# Patient Record
Sex: MALE | Race: WHITE | NOT HISPANIC OR LATINO | Employment: FULL TIME
[De-identification: names, ages, dates, MRNs, and addresses within clinical notes are randomized per-mention and may not be internally consistent; named-entity substitution may affect disease eponyms.]

---

## 2024-09-17 ENCOUNTER — TRANSCRIBE ORDERS (OUTPATIENT)
Dept: OTHER | Age: 34
End: 2024-09-17

## 2024-09-17 DIAGNOSIS — C16.0 GE JUNCTION CARCINOMA (H): Primary | ICD-10-CM

## 2024-09-19 NOTE — TELEPHONE ENCOUNTER
Action September 26, 2024 3:04 PM ABT   Action Taken Slides from [facility] received and taken to 5th floor path lab for review.     Action September 20, 2024 1:46 PM BWM   Action Taken Images from [facility] received and resolved into PACS on 9/20     RECORDS STATUS - ALL OTHER DIAGNOSIS      RECORDS RECEIVED FROM: [facility]   NOTES STATUS DETAILS   OFFICE NOTE from referring provider [facility] 9/17/2024 - Dr. Rivera Dacosta   OFFICE NOTE from medical oncologist [facility] 9/9/2024 - Dr. Edu Lewis   OPERATIVE REPORT [facility] 9/12/2024 - Endoscopy   9/5/2024, 8/30/2024 - EGD   MEDICATION LIST [facility]    LABS     PATHOLOGY REPORTS Req from [facility] on 9/19 9/12/2024 - [id number]   9/5/2024 - [id number]   8/30/2024 - [id number]    ANYTHING RELATED TO DIAGNOSIS [facility] 9/5/2024   PATHOLOGY [facility] TRACKING   TRACKING #: [id number]   IMAGING (NEED IMAGES & REPORT)     CT SCANS PACS CT Chest/Abdomen/Pelvis: 8/30/2024   PET PACS PET Skull Base to Mid Thigh: 9/16/2024      Addended by: Porfirio Lassiter on: 2/17/2021 04:42 PM     Modules accepted: Orders

## 2024-09-24 NOTE — PROGRESS NOTES
THORACIC SURGERY NEW PATIENT HISTORY AND PHYSICAL EXAM     Dear ,    I saw Prashant Allen at Dr. 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     HPI  Prashant Allen is a 34 year old male who is referred with the diagnosis of an adenocarcinoma of the GEJ with suspected linitis plastica involving the entire stomach until ~ 1 cm proximal to the pylorus. Endoscopic biopsies of the suspected LP were inconclusive. Of note, the EUS scope could not pass the prepyloric area due to gastric wall thickening, the regular scope could pass.  Prashant has lost ~20 pounds and his main symptom is early satiety.    Previsit Tests   EGD (2024): Non-circumferential mass at 40 cm starting at the z-line, nodular appearing gastric mucosa, normal appearing D1 and D2.  Biopsy (2024):  EUS (Dr. Vela):   The GEJ lesion was ~ 2cm in length and did not invade the muscularis propria. Seven small LN (< 10 mm) in the LGA area, one was sampled (no sonographic description of the LN, but interpreted as possible N3). uT1N3. FNA of LN reportedly negative.  The GEJ mass appears to be separate from the linitis plastica involving >10 of the distal stomach. The gastric wall was up to 1.3 cm thick, and the very distal portion of the gastric wall transitioned to normal close to the pylorus.   EGD with biopsy of antrum (Dr. Vela 2024): No evidence of malignancy  PET-CT (2024): GEJ SUV ~10, diffuse gastric SUV ~4.5      Antral PET positive thickening to the pre-pyloric area and a PET positive nodular area extending into the surrounding fat          PMH  Reviewed, as below    No past medical history on file.     PSH  Reviewed, as below    No past surgical history on file.     FH  Mother  at 31 from breast cancer. Many maternal relatives have cancers and several are CHEK2 positive.  Prashant has not been genetically tested.    No current outpatient  "medications on file.     No current facility-administered medications for this visit.      No Known Allergies     Review Of Systems  Skin: negative  Eyes: negative  Ears/Nose/Throat: negative  Respiratory: No shortness of breath, dyspnea on exertion, cough, or hemoptysis  Cardiovascular: negative  Gastrointestinal: negative and as in HPI  Genitourinary: negative  Musculoskeletal: negative  Neurologic: negative  Psychiatric: negative  Hematologic/Lymphatic/Immunologic: negative  Endocrine: negative      ETOH Rare  TOB Never smoker    Physical examination  /78 (BP Location: Right arm, Patient Position: Right side, Cuff Size: Adult Regular)   Pulse 56   Temp 97.8  F (36.6  C) (Oral)   Resp 16   Ht 1.89 m (6' 2.41\")   Wt 101.3 kg (223 lb 4.8 oz)   SpO2 99%   BMI 28.36 kg/m    No palpable lymphadenopathy  No skin nodules  Lungs clear  Heart regular, no murmurs  Abdomen soft, non-tender  Extremities warm, no IE edema      From a personal perspective, he works as a . He is here with his aunt, Nancy, who is also his adoptive mother. [place] mother  of breast cancer when he was 6.      Code Status: Full Code    Health Care Proxy: He has not yet filled out a directive, but he knows that it is available on [employer].    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 I reviewed the plan as follows:        1) Review of all pathological and cytological samples    2) Diagnostic laparoscopy and EGD: He is scheduled for .          I appreciate the opportunity to participate in the care of your patient and will keep you " updated.      Sincerely,    Willy Alvarado MD

## 2024-09-25 ENCOUNTER — PRE VISIT (OUTPATIENT)
Dept: SURGERY | Facility: CLINIC | Age: 34
End: 2024-09-25
Payer: COMMERCIAL

## 2024-09-25 ENCOUNTER — PREP FOR PROCEDURE (OUTPATIENT)
Dept: SURGERY | Facility: CLINIC | Age: 34
End: 2024-09-25
Payer: COMMERCIAL

## 2024-09-25 ENCOUNTER — TELEPHONE (OUTPATIENT)
Dept: SURGERY | Facility: CLINIC | Age: 34
End: 2024-09-25
Payer: COMMERCIAL

## 2024-09-25 ENCOUNTER — ONCOLOGY VISIT (OUTPATIENT)
Dept: SURGERY | Facility: CLINIC | Age: 34
End: 2024-09-25
Attending: SURGERY
Payer: COMMERCIAL

## 2024-09-25 VITALS
DIASTOLIC BLOOD PRESSURE: 78 MMHG | HEIGHT: 74 IN | RESPIRATION RATE: 16 BRPM | TEMPERATURE: 97.8 F | HEART RATE: 56 BPM | OXYGEN SATURATION: 99 % | WEIGHT: 223.3 LBS | BODY MASS INDEX: 28.66 KG/M2 | SYSTOLIC BLOOD PRESSURE: 123 MMHG

## 2024-09-25 DIAGNOSIS — C16.8 MALIGNANT NEOPLASM OF OVERLAPPING SITES OF STOMACH (H): Primary | ICD-10-CM

## 2024-09-25 DIAGNOSIS — C16.9 LINITIS PLASTICA (H): ICD-10-CM

## 2024-09-25 DIAGNOSIS — C16.0 SIEWERT TYPE III ADENOCARCINOMA OF GASTROESOPHAGEAL JUNCTION (H): Primary | ICD-10-CM

## 2024-09-25 PROCEDURE — 99213 OFFICE O/P EST LOW 20 MIN: CPT | Performed by: THORACIC SURGERY (CARDIOTHORACIC VASCULAR SURGERY)

## 2024-09-25 PROCEDURE — 99204 OFFICE O/P NEW MOD 45 MIN: CPT | Performed by: THORACIC SURGERY (CARDIOTHORACIC VASCULAR SURGERY)

## 2024-09-25 RX ORDER — ENOXAPARIN SODIUM 100 MG/ML
40 INJECTION SUBCUTANEOUS
Status: CANCELLED | OUTPATIENT
Start: 2024-09-25

## 2024-09-25 RX ORDER — ACETAMINOPHEN 325 MG/1
975 TABLET ORAL ONCE
Status: CANCELLED | OUTPATIENT
Start: 2024-09-25 | End: 2024-09-25

## 2024-09-25 ASSESSMENT — PAIN SCALES - GENERAL: PAINLEVEL: NO PAIN (0)

## 2024-09-25 NOTE — NURSING NOTE
"Oncology Rooming Note    September 25, 2024 7:37 AM   Prashant Allen is a 34 year old male who presents for:    Chief Complaint   Patient presents with    Oncology Clinic Visit     New nahun Bejaranowert type 111 adenocarcinoma of gastroesophageal junction      Initial Vitals: /78 (BP Location: Right arm, Patient Position: Right side, Cuff Size: Adult Regular)   Pulse 56   Temp 97.8  F (36.6  C) (Oral)   Resp 16   Ht 1.89 m (6' 2.41\")   Wt 101.3 kg (223 lb 4.8 oz)   SpO2 99%   BMI 28.36 kg/m   Estimated body mass index is 28.36 kg/m  as calculated from the following:    Height as of this encounter: 1.89 m (6' 2.41\").    Weight as of this encounter: 101.3 kg (223 lb 4.8 oz). Body surface area is 2.31 meters squared.  No Pain (0) Comment: Data Unavailable   No LMP for male patient.  Allergies reviewed: Yes  Medications reviewed: Yes    Medications: Medication refills not needed today.  Pharmacy name entered into [employer]: [facility] [address]    Frailty Screening:   Is the patient here for a new oncology consult visit in cancer care? 1. Yes. Over the past month, have you experienced difficulty or required a caregiver to assist with:   1. Balance, walking or general mobility (including any falls)? NO  2. Completion of self-care tasks such as bathing, dressing, toileting, grooming/hygiene?  NO  3. Concentration or memory that affects your daily life?  NO       Clinical concerns: none      Tim Peña"

## 2024-09-25 NOTE — LETTER
9/25/2024      Prashant Allen  [address]  [facility] [address]      Dear Colleague,    Thank you for referring your patient, Prashant Allen, to the [facility]. Please see a copy of my visit note below.    THORACIC SURGERY NEW PATIENT HISTORY AND PHYSICAL EXAM     Dear ,    I saw Prashant Allen at Dr. 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     HPI  Prashant Allen is a 34 year old male who is referred with the diagnosis of an adenocarcinoma of the GEJ with suspected linitis plastica involving the entire stomach until ~ 1 cm proximal to the pylorus. Endoscopic biopsies of the suspected LP were inconclusive. Of note, the EUS scope could not pass the prepyloric area due to gastric wall thickening, the regular scope could pass.  Prashant has lost ~20 pounds and his main symptom is early satiety.    Previsit Tests   EGD (8/30/2024): Non-circumferential mass at 40 cm starting at the z-line, nodular appearing gastric mucosa, normal appearing D1 and D2.  Biopsy (8/30/2024):  EUS (Dr. Vela):   The GEJ lesion was ~ 2cm in length and did not invade the muscularis propria. Seven small LN (< 10 mm) in the LGA area, one was sampled (no sonographic description of the LN, but interpreted as possible N3). uT1N3. FNA of LN reportedly negative.  The GEJ mass appears to be separate from the linitis plastica involving >10 of the distal stomach. The gastric wall was up to 1.3 cm thick, and the very distal portion of the gastric wall transitioned to normal close to the pylorus.   EGD with biopsy of antrum (Dr. Vela 9/12/2024): No evidence of malignancy  PET-CT (9/16/2024): GEJ SUV ~10, diffuse gastric SUV ~4.5      Antral PET positive thickening to the pre-pyloric area and a PET positive nodular area extending into the surrounding fat          PMH  Reviewed, as below    No past medical history on file.     PSH  Reviewed, as  "below    No past surgical history on file.     FH  Mother  at 31 from breast cancer. Many maternal relatives have cancers and several are CHEK2 positive.  Prashant has not been genetically tested.    No current outpatient medications on file.     No current facility-administered medications for this visit.      No Known Allergies     Review Of Systems  Skin: negative  Eyes: negative  Ears/Nose/Throat: negative  Respiratory: No shortness of breath, dyspnea on exertion, cough, or hemoptysis  Cardiovascular: negative  Gastrointestinal: negative and as in HPI  Genitourinary: negative  Musculoskeletal: negative  Neurologic: negative  Psychiatric: negative  Hematologic/Lymphatic/Immunologic: negative  Endocrine: negative      ETOH Rare  TOB Never smoker    Physical examination  /78 (BP Location: Right arm, Patient Position: Right side, Cuff Size: Adult Regular)   Pulse 56   Temp 97.8  F (36.6  C) (Oral)   Resp 16   Ht 1.89 m (6' 2.41\")   Wt 101.3 kg (223 lb 4.8 oz)   SpO2 99%   BMI 28.36 kg/m    No palpable lymphadenopathy  No skin nodules  Lungs clear  Heart regular, no murmurs  Abdomen soft, non-tender  Extremities warm, no IE edema      From a personal perspective, he works as a . He is here with his aunt, Nancy, who is also his adoptive mother. Prashant mother  of breast cancer when he was 6.      Code Status: Full Code    Health Care Proxy: He has not yet filled out a directive, but he knows that it is available on [employer].    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 I reviewed the plan as follows:        1) Review " of all pathological and cytological samples    2) Diagnostic laparoscopy and EGD: He is scheduled for 9/30.          I appreciate the opportunity to participate in the care of your patient and will keep you updated.      Sincerely,    Willy Alvarado MD       Again, thank you for allowing me to participate in the care of your patient.        Sincerely,        Willy Alvarado MD

## 2024-09-25 NOTE — TELEPHONE ENCOUNTER
Spoke with patient to schedule procedure with Dr. Alvarado   Procedure was scheduled on 09/30 at [facility] OR  Patient will have H&P with On file    Informed pt of surgery details:  Date:    Monday, September 30, 2024  Arrival Time:  5:30 am    Pt is aware surgery start time may change, and someone will reach out with the new arrival time, if so.    Patient is aware a COVID-19 test is needed before their procedure ONLY IF symptomatic.   (Patient is aware Thoracic is no longer requiring COVID-19 test)       Patient is aware a / is needed day of surgery.   Surgery Letter was sent via [employer]-pt will complete registration and writer will send packet,      Patient has my direct contact information for any further questions.

## 2024-09-27 ENCOUNTER — ANESTHESIA EVENT (OUTPATIENT)
Dept: SURGERY | Facility: CLINIC | Age: 34
End: 2024-09-27
Payer: COMMERCIAL

## 2024-09-27 ENCOUNTER — LAB REQUISITION (OUTPATIENT)
Dept: LAB | Facility: CLINIC | Age: 34
End: 2024-09-27
Payer: COMMERCIAL

## 2024-09-27 PROCEDURE — 88321 CONSLTJ&REPRT SLD PREP ELSWR: CPT | Performed by: STUDENT IN AN ORGANIZED HEALTH CARE EDUCATION/TRAINING PROGRAM

## 2024-09-27 RX ORDER — DEXAMETHASONE SODIUM PHOSPHATE 4 MG/ML
4 INJECTION, SOLUTION INTRA-ARTICULAR; INTRALESIONAL; INTRAMUSCULAR; INTRAVENOUS; SOFT TISSUE
Status: CANCELLED | OUTPATIENT
Start: 2024-09-27

## 2024-09-27 RX ORDER — OXYCODONE HYDROCHLORIDE 5 MG/1
5 TABLET ORAL
Status: CANCELLED | OUTPATIENT
Start: 2024-09-27

## 2024-09-27 RX ORDER — ONDANSETRON 4 MG/1
4 TABLET, ORALLY DISINTEGRATING ORAL EVERY 30 MIN PRN
Status: CANCELLED | OUTPATIENT
Start: 2024-09-27

## 2024-09-27 RX ORDER — OXYCODONE HYDROCHLORIDE 10 MG/1
10 TABLET ORAL
Status: CANCELLED | OUTPATIENT
Start: 2024-09-27

## 2024-09-27 RX ORDER — ONDANSETRON 2 MG/ML
4 INJECTION INTRAMUSCULAR; INTRAVENOUS EVERY 30 MIN PRN
Status: CANCELLED | OUTPATIENT
Start: 2024-09-27

## 2024-09-27 RX ORDER — NALOXONE HYDROCHLORIDE 0.4 MG/ML
0.1 INJECTION, SOLUTION INTRAMUSCULAR; INTRAVENOUS; SUBCUTANEOUS
Status: CANCELLED | OUTPATIENT
Start: 2024-09-27

## 2024-09-27 NOTE — ANESTHESIA PREPROCEDURE EVALUATION
Anesthesia Pre-Procedure Evaluation    Patient: Prashant Allen   MRN: [id number] : 1990        Procedure : Procedure(s):  DIAGNOSTIC LAPAROSCOPY  Esophagoscopy, gastroscopy, duodenoscopy (EGD)          Prashant Allen is a 33 yo male who was recently diagnosed with adenocarcinoma of the gastroesophageal junction (2024) and will undergo above stated procedure.     No past medical history on file.   No past surgical history on file.   No Known Allergies   Social History     Tobacco Use    Smoking status: Never    Smokeless tobacco: Never   Substance Use Topics    Alcohol use: Not Currently     Comment: wine liquor not often      Wt Readings from Last 1 Encounters:   24 101.3 kg (223 lb 4.8 oz)        Anesthesia Evaluation   Pt has had prior anesthetic. Type: General and MAC.    No history of anesthetic complications       ROS/MED HX  ENT/Pulmonary:  - neg pulmonary ROS     Neurologic:  - neg neurologic ROS     Cardiovascular:  - neg cardiovascular ROS     METS/Exercise Tolerance:     Hematologic:  - neg hematologic  ROS     Musculoskeletal:  - neg musculoskeletal ROS     GI/Hepatic: Comment:   #Dysphagia  #Fungating ulcerating mass with bleeding at the gastroesophageal junction - found to be adenocarcinoma   #Early satiety    - neg GI/hepatic ROS     Renal/Genitourinary:  - neg Renal ROS     Endo: Comment:   #Weight loss related to decreased appetite      Psychiatric/Substance Use:  - neg psychiatric ROS     Infectious Disease:  - neg infectious disease ROS     Malignancy: Comment:   #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      Other:            Physical Exam    Airway        Mallampati: II   TM distance: > 3 FB   Neck ROM: full   Mouth opening: > 3 cm    Respiratory Devices and Support         Dental       (+) Completely normal teeth      Cardiovascular          Rhythm and rate: regular and normal     Pulmonary           breath sounds clear to auscultation           OUTSIDE  "LABS:  CBC: No results found for: \"WBC\", \"HGB\", \"HCT\", \"PLT\"  BMP: No results found for: \"NA\", \"POTASSIUM\", \"CHLORIDE\", \"CO2\", \"BUN\", \"CR\", \"GLC\"  COAGS: No results found for: \"PTT\", \"INR\", \"FIBR\"  POC: No results found for: \"BGM\", \"HCG\", \"HCGS\"  HEPATIC: No results found for: \"ALBUMIN\", \"PROTTOTAL\", \"ALT\", \"AST\", \"GGT\", \"ALKPHOS\", \"BILITOTAL\", \"BILIDIRECT\", \"JOSE ELIAS\"  OTHER: No results found for: \"PH\", \"LACT\", \"A1C\", \"MARILYNN\", \"PHOS\", \"MAG\", \"LIPASE\", \"AMYLASE\", \"TSH\", \"T4\", \"T3\", \"CRP\", \"SED\"    Anesthesia Plan    ASA Status:  3    NPO Status:  NPO Appropriate    Anesthesia Type: General.     - Airway: ETT   Induction: Intravenous, Propofol.   Maintenance: Balanced.   Techniques and Equipment:     - Lines/Monitors: 2nd IV, BIS     Consents    Anesthesia Plan(s) and associated risks, benefits, and realistic alternatives discussed. Questions answered and patient/representative(s) expressed understanding.     - Discussed: Risks, Benefits and Alternatives for the PROCEDURE were discussed     - Discussed with:  Patient      - Extended Intubation/Ventilatory Support Discussed: Yes.      - Patient is DNR/DNI Status: No          Postoperative Care    Pain management: IV analgesics, Oral pain medications, Multi-modal analgesia.   PONV prophylaxis: Ondansetron (or other 5HT-3), Dexamethasone or Solumedrol     Comments:               Maria E Alcazar MD    I have reviewed the pertinent notes and labs in the chart from the past 30 days and (re)examined the patient.  Any updates or changes from those notes are reflected in this note.              # Overweight: Estimated body mass index is 28.36 kg/m  as calculated from the following:    Height as of 9/25/24: 1.89 m (6' 2.41\").    Weight as of 9/25/24: 101.3 kg (223 lb 4.8 oz).      "

## 2024-09-30 ENCOUNTER — HOSPITAL ENCOUNTER (OUTPATIENT)
Facility: CLINIC | Age: 34
Discharge: HOME | End: 2024-09-30
Attending: THORACIC SURGERY (CARDIOTHORACIC VASCULAR SURGERY) | Admitting: THORACIC SURGERY (CARDIOTHORACIC VASCULAR SURGERY)
Payer: COMMERCIAL

## 2024-09-30 ENCOUNTER — ANESTHESIA (OUTPATIENT)
Dept: SURGERY | Facility: CLINIC | Age: 34
End: 2024-09-30
Payer: COMMERCIAL

## 2024-09-30 VITALS
SYSTOLIC BLOOD PRESSURE: 127 MMHG | HEART RATE: 97 BPM | OXYGEN SATURATION: 96 % | TEMPERATURE: 97.3 F | DIASTOLIC BLOOD PRESSURE: 84 MMHG | BODY MASS INDEX: 28.21 KG/M2 | RESPIRATION RATE: 16 BRPM | WEIGHT: 219.8 LBS | HEIGHT: 74 IN

## 2024-09-30 DIAGNOSIS — G89.18 POST-OP PAIN: Primary | ICD-10-CM

## 2024-09-30 LAB
ABO/RH(D): NORMAL
ANTIBODY SCREEN: NEGATIVE
GLUCOSE BLDC GLUCOMTR-MCNC: 87 MG/DL (ref 70–99)
SPECIMEN EXPIRATION DATE: NORMAL

## 2024-09-30 PROCEDURE — 250N000025 HC SEVOFLURANE, PER MIN: Performed by: THORACIC SURGERY (CARDIOTHORACIC VASCULAR SURGERY)

## 2024-09-30 PROCEDURE — 250N000011 HC RX IP 250 OP 636: Performed by: THORACIC SURGERY (CARDIOTHORACIC VASCULAR SURGERY)

## 2024-09-30 PROCEDURE — 86901 BLOOD TYPING SEROLOGIC RH(D): CPT | Performed by: THORACIC SURGERY (CARDIOTHORACIC VASCULAR SURGERY)

## 2024-09-30 PROCEDURE — 370N000017 HC ANESTHESIA TECHNICAL FEE, PER MIN: Performed by: THORACIC SURGERY (CARDIOTHORACIC VASCULAR SURGERY)

## 2024-09-30 PROCEDURE — 43235 EGD DIAGNOSTIC BRUSH WASH: CPT | Mod: GC | Performed by: THORACIC SURGERY (CARDIOTHORACIC VASCULAR SURGERY)

## 2024-09-30 PROCEDURE — 250N000013 HC RX MED GY IP 250 OP 250 PS 637: Performed by: THORACIC SURGERY (CARDIOTHORACIC VASCULAR SURGERY)

## 2024-09-30 PROCEDURE — 710N000010 HC RECOVERY PHASE 1, LEVEL 2, PER MIN: Performed by: THORACIC SURGERY (CARDIOTHORACIC VASCULAR SURGERY)

## 2024-09-30 PROCEDURE — 88112 CYTOPATH CELL ENHANCE TECH: CPT | Mod: TC | Performed by: THORACIC SURGERY (CARDIOTHORACIC VASCULAR SURGERY)

## 2024-09-30 PROCEDURE — 88112 CYTOPATH CELL ENHANCE TECH: CPT | Mod: 26 | Performed by: PATHOLOGY

## 2024-09-30 PROCEDURE — 272N000001 HC OR GENERAL SUPPLY STERILE: Performed by: THORACIC SURGERY (CARDIOTHORACIC VASCULAR SURGERY)

## 2024-09-30 PROCEDURE — 250N000009 HC RX 250: Performed by: THORACIC SURGERY (CARDIOTHORACIC VASCULAR SURGERY)

## 2024-09-30 PROCEDURE — 88305 TISSUE EXAM BY PATHOLOGIST: CPT | Mod: 26 | Performed by: PATHOLOGY

## 2024-09-30 PROCEDURE — 49321 LAPAROSCOPY BIOPSY: CPT | Mod: GC | Performed by: THORACIC SURGERY (CARDIOTHORACIC VASCULAR SURGERY)

## 2024-09-30 PROCEDURE — 36415 COLL VENOUS BLD VENIPUNCTURE: CPT | Performed by: THORACIC SURGERY (CARDIOTHORACIC VASCULAR SURGERY)

## 2024-09-30 PROCEDURE — 49084 PERITONEAL LAVAGE: CPT | Mod: XU | Performed by: THORACIC SURGERY (CARDIOTHORACIC VASCULAR SURGERY)

## 2024-09-30 PROCEDURE — 999N000141 HC STATISTIC PRE-PROCEDURE NURSING ASSESSMENT: Performed by: THORACIC SURGERY (CARDIOTHORACIC VASCULAR SURGERY)

## 2024-09-30 PROCEDURE — 86900 BLOOD TYPING SEROLOGIC ABO: CPT | Performed by: THORACIC SURGERY (CARDIOTHORACIC VASCULAR SURGERY)

## 2024-09-30 PROCEDURE — 250N000011 HC RX IP 250 OP 636

## 2024-09-30 PROCEDURE — 710N000012 HC RECOVERY PHASE 2, PER MINUTE: Performed by: THORACIC SURGERY (CARDIOTHORACIC VASCULAR SURGERY)

## 2024-09-30 PROCEDURE — 88331 PATH CONSLTJ SURG 1 BLK 1SPC: CPT | Mod: 26 | Performed by: PATHOLOGY

## 2024-09-30 PROCEDURE — 250N000009 HC RX 250

## 2024-09-30 PROCEDURE — 258N000003 HC RX IP 258 OP 636

## 2024-09-30 PROCEDURE — 88331 PATH CONSLTJ SURG 1 BLK 1SPC: CPT | Mod: TC | Performed by: THORACIC SURGERY (CARDIOTHORACIC VASCULAR SURGERY)

## 2024-09-30 PROCEDURE — 360N000076 HC SURGERY LEVEL 3, PER MIN: Performed by: THORACIC SURGERY (CARDIOTHORACIC VASCULAR SURGERY)

## 2024-09-30 RX ORDER — NALOXONE HYDROCHLORIDE 0.4 MG/ML
0.1 INJECTION, SOLUTION INTRAMUSCULAR; INTRAVENOUS; SUBCUTANEOUS
Status: DISCONTINUED | OUTPATIENT
Start: 2024-09-30 | End: 2024-09-30 | Stop reason: HOSPADM

## 2024-09-30 RX ORDER — OMEPRAZOLE 40 MG/1
40 CAPSULE, DELAYED RELEASE ORAL DAILY
Qty: 60 CAPSULE | Refills: 0 | Status: SHIPPED | OUTPATIENT
Start: 2024-09-30

## 2024-09-30 RX ORDER — FENTANYL CITRATE 50 UG/ML
50 INJECTION, SOLUTION INTRAMUSCULAR; INTRAVENOUS EVERY 5 MIN PRN
Status: DISCONTINUED | OUTPATIENT
Start: 2024-09-30 | End: 2024-09-30 | Stop reason: HOSPADM

## 2024-09-30 RX ORDER — PROPOFOL 10 MG/ML
INJECTION, EMULSION INTRAVENOUS PRN
Status: DISCONTINUED | OUTPATIENT
Start: 2024-09-30 | End: 2024-09-30

## 2024-09-30 RX ORDER — HYDROMORPHONE HCL IN WATER/PF 6 MG/30 ML
0.2 PATIENT CONTROLLED ANALGESIA SYRINGE INTRAVENOUS EVERY 5 MIN PRN
Status: DISCONTINUED | OUTPATIENT
Start: 2024-09-30 | End: 2024-09-30 | Stop reason: HOSPADM

## 2024-09-30 RX ORDER — ONDANSETRON 4 MG/1
4 TABLET, ORALLY DISINTEGRATING ORAL EVERY 30 MIN PRN
Status: DISCONTINUED | OUTPATIENT
Start: 2024-09-30 | End: 2024-09-30 | Stop reason: HOSPADM

## 2024-09-30 RX ORDER — LIDOCAINE HYDROCHLORIDE 20 MG/ML
INJECTION, SOLUTION INFILTRATION; PERINEURAL PRN
Status: DISCONTINUED | OUTPATIENT
Start: 2024-09-30 | End: 2024-09-30

## 2024-09-30 RX ORDER — FENTANYL CITRATE 50 UG/ML
INJECTION, SOLUTION INTRAMUSCULAR; INTRAVENOUS PRN
Status: DISCONTINUED | OUTPATIENT
Start: 2024-09-30 | End: 2024-09-30

## 2024-09-30 RX ORDER — OXYCODONE HYDROCHLORIDE 5 MG/1
5 TABLET ORAL EVERY 4 HOURS PRN
Qty: 12 TABLET | Refills: 0 | Status: SHIPPED | OUTPATIENT
Start: 2024-09-30 | End: 2024-10-03

## 2024-09-30 RX ORDER — BUPIVACAINE HYDROCHLORIDE AND EPINEPHRINE 2.5; 5 MG/ML; UG/ML
INJECTION, SOLUTION INFILTRATION; PERINEURAL PRN
Status: DISCONTINUED | OUTPATIENT
Start: 2024-09-30 | End: 2024-09-30 | Stop reason: HOSPADM

## 2024-09-30 RX ORDER — SODIUM CHLORIDE, SODIUM LACTATE, POTASSIUM CHLORIDE, CALCIUM CHLORIDE 600; 310; 30; 20 MG/100ML; MG/100ML; MG/100ML; MG/100ML
INJECTION, SOLUTION INTRAVENOUS CONTINUOUS
Status: DISCONTINUED | OUTPATIENT
Start: 2024-09-30 | End: 2024-09-30 | Stop reason: HOSPADM

## 2024-09-30 RX ORDER — FENTANYL CITRATE 50 UG/ML
25 INJECTION, SOLUTION INTRAMUSCULAR; INTRAVENOUS EVERY 5 MIN PRN
Status: DISCONTINUED | OUTPATIENT
Start: 2024-09-30 | End: 2024-09-30 | Stop reason: HOSPADM

## 2024-09-30 RX ORDER — ONDANSETRON 2 MG/ML
INJECTION INTRAMUSCULAR; INTRAVENOUS PRN
Status: DISCONTINUED | OUTPATIENT
Start: 2024-09-30 | End: 2024-09-30

## 2024-09-30 RX ORDER — DEXAMETHASONE SODIUM PHOSPHATE 4 MG/ML
4 INJECTION, SOLUTION INTRA-ARTICULAR; INTRALESIONAL; INTRAMUSCULAR; INTRAVENOUS; SOFT TISSUE
Status: DISCONTINUED | OUTPATIENT
Start: 2024-09-30 | End: 2024-09-30 | Stop reason: HOSPADM

## 2024-09-30 RX ORDER — ACETAMINOPHEN 325 MG/1
975 TABLET ORAL ONCE
Status: DISCONTINUED | OUTPATIENT
Start: 2024-09-30 | End: 2024-09-30 | Stop reason: HOSPADM

## 2024-09-30 RX ORDER — ONDANSETRON 2 MG/ML
4 INJECTION INTRAMUSCULAR; INTRAVENOUS EVERY 30 MIN PRN
Status: DISCONTINUED | OUTPATIENT
Start: 2024-09-30 | End: 2024-09-30 | Stop reason: HOSPADM

## 2024-09-30 RX ORDER — ACETAMINOPHEN 325 MG/1
975 TABLET ORAL ONCE
Status: COMPLETED | OUTPATIENT
Start: 2024-09-30 | End: 2024-09-30

## 2024-09-30 RX ORDER — LIDOCAINE 40 MG/G
CREAM TOPICAL
Status: DISCONTINUED | OUTPATIENT
Start: 2024-09-30 | End: 2024-09-30 | Stop reason: HOSPADM

## 2024-09-30 RX ORDER — HYDROMORPHONE HCL IN WATER/PF 6 MG/30 ML
0.4 PATIENT CONTROLLED ANALGESIA SYRINGE INTRAVENOUS EVERY 5 MIN PRN
Status: DISCONTINUED | OUTPATIENT
Start: 2024-09-30 | End: 2024-09-30 | Stop reason: HOSPADM

## 2024-09-30 RX ORDER — CEFAZOLIN SODIUM/WATER 2 G/20 ML
2 SYRINGE (ML) INTRAVENOUS
Status: COMPLETED | OUTPATIENT
Start: 2024-09-30 | End: 2024-09-30

## 2024-09-30 RX ORDER — CEFAZOLIN SODIUM/WATER 2 G/20 ML
2 SYRINGE (ML) INTRAVENOUS SEE ADMIN INSTRUCTIONS
Status: DISCONTINUED | OUTPATIENT
Start: 2024-09-30 | End: 2024-09-30 | Stop reason: HOSPADM

## 2024-09-30 RX ORDER — LABETALOL HYDROCHLORIDE 5 MG/ML
10 INJECTION, SOLUTION INTRAVENOUS
Status: DISCONTINUED | OUTPATIENT
Start: 2024-09-30 | End: 2024-09-30 | Stop reason: HOSPADM

## 2024-09-30 RX ORDER — ENOXAPARIN SODIUM 100 MG/ML
40 INJECTION SUBCUTANEOUS
Status: COMPLETED | OUTPATIENT
Start: 2024-09-30 | End: 2024-09-30

## 2024-09-30 RX ORDER — DEXAMETHASONE SODIUM PHOSPHATE 4 MG/ML
INJECTION, SOLUTION INTRA-ARTICULAR; INTRALESIONAL; INTRAMUSCULAR; INTRAVENOUS; SOFT TISSUE PRN
Status: DISCONTINUED | OUTPATIENT
Start: 2024-09-30 | End: 2024-09-30

## 2024-09-30 RX ORDER — ACETAMINOPHEN 325 MG/1
650 TABLET ORAL
Status: CANCELLED | OUTPATIENT
Start: 2024-09-30

## 2024-09-30 RX ADMIN — ONDANSETRON 4 MG: 2 INJECTION INTRAMUSCULAR; INTRAVENOUS at 09:27

## 2024-09-30 RX ADMIN — SODIUM CHLORIDE, POTASSIUM CHLORIDE, SODIUM LACTATE AND CALCIUM CHLORIDE: 600; 310; 30; 20 INJECTION, SOLUTION INTRAVENOUS at 07:43

## 2024-09-30 RX ADMIN — DEXAMETHASONE SODIUM PHOSPHATE 4 MG: 4 INJECTION, SOLUTION INTRA-ARTICULAR; INTRALESIONAL; INTRAMUSCULAR; INTRAVENOUS; SOFT TISSUE at 07:52

## 2024-09-30 RX ADMIN — Medication 2 G: at 07:43

## 2024-09-30 RX ADMIN — ENOXAPARIN SODIUM 40 MG: 40 INJECTION SUBCUTANEOUS at 06:21

## 2024-09-30 RX ADMIN — Medication 20 MG: at 08:22

## 2024-09-30 RX ADMIN — MIDAZOLAM 2 MG: 1 INJECTION INTRAMUSCULAR; INTRAVENOUS at 07:40

## 2024-09-30 RX ADMIN — Medication 50 MG: at 07:32

## 2024-09-30 RX ADMIN — HYDROMORPHONE HYDROCHLORIDE 0.5 MG: 1 INJECTION, SOLUTION INTRAMUSCULAR; INTRAVENOUS; SUBCUTANEOUS at 08:48

## 2024-09-30 RX ADMIN — ACETAMINOPHEN 975 MG: 325 TABLET ORAL at 06:18

## 2024-09-30 RX ADMIN — PROPOFOL 200 MG: 10 INJECTION, EMULSION INTRAVENOUS at 07:32

## 2024-09-30 RX ADMIN — PROPOFOL 20 MG: 10 INJECTION, EMULSION INTRAVENOUS at 09:14

## 2024-09-30 RX ADMIN — Medication 200 MG: at 09:32

## 2024-09-30 RX ADMIN — FENTANYL CITRATE 100 MCG: 50 INJECTION INTRAMUSCULAR; INTRAVENOUS at 07:32

## 2024-09-30 RX ADMIN — LIDOCAINE HYDROCHLORIDE 100 MG: 20 INJECTION, SOLUTION INFILTRATION; PERINEURAL at 07:32

## 2024-09-30 RX ADMIN — SODIUM CHLORIDE, POTASSIUM CHLORIDE, SODIUM LACTATE AND CALCIUM CHLORIDE: 600; 310; 30; 20 INJECTION, SOLUTION INTRAVENOUS at 07:30

## 2024-09-30 ASSESSMENT — ACTIVITIES OF DAILY LIVING (ADL)
ADLS_ACUITY_SCORE: 20

## 2024-09-30 NOTE — ANESTHESIA POSTPROCEDURE EVALUATION
Patient: Prashant Allen    Procedure: Procedure(s):  DIAGNOSTIC LAPAROSCOPY with biopsy and lavage  Esophagoscopy, gastroscopy, duodenoscopy (EGD)       Anesthesia Type:  General    Note:  Disposition: Outpatient   Postop Pain Control: Uneventful            Sign Out: Well controlled pain   PONV: No   Neuro/Psych: Uneventful            Sign Out: Acceptable/Baseline neuro status   Airway/Respiratory: Uneventful            Sign Out: Acceptable/Baseline resp. status   CV/Hemodynamics: Uneventful            Sign Out: Acceptable CV status; No obvious hypovolemia; No obvious fluid overload   Other NRE: NONE   DID A NON-ROUTINE EVENT OCCUR? No           Last vitals:  Vitals Value Taken Time   /93 09/30/24 1030   Temp 36.3  C (97.3  F) 09/30/24 1000   Pulse 94 09/30/24 1033   Resp 10 09/30/24 1032   SpO2 96 % 09/30/24 1033   Vitals shown include unfiled device data.    Electronically Signed By: Jeanmarie Perez MD  September 30, 2024  10:33 AM

## 2024-09-30 NOTE — BRIEF OP NOTE
[facility]    Brief Operative Note    Pre-operative diagnosis: Malignant neoplasm of overlapping sites of stomach [C16.8]  Post-operative diagnosis Same as pre-operative diagnosis    Procedure: DIAGNOSTIC LAPAROSCOPY with biopsy and lavage, N/A - Abdomen  Esophagoscopy, gastroscopy, duodenoscopy (EGD), N/A - Esophagus    Surgeon: Surgeons and Role:     * Willy Alvarado MD - Primary     * Roslyn Chaidez PA-C - Assisting     * Carrie Marshall MD - Resident - Assisting  Anesthesia: General   Estimated Blood Loss: 5cc    Drains: None  Specimens:   ID Type Source Tests Collected by Time Destination   1 : diaphragm nodule Tissue Other SURGICAL PATHOLOGY EXAM Willy Alvarado MD 9/30/2024  8:40 AM    2 : Peritoneal Washing Washings Peritoneum NON-GYNECOLOGIC CYTOLOGY Willy Alvarado MD 9/30/2024  9:07 AM      Findings:  Area of what appears to be lymphatic invasion around mid-portion of lesser curve of stomach, thick and hardened lesion. Thickened pylorus. EGD with thickened mucosa consistent with linitis. No macroscopic evidence of other peritoneal disease - no liver, diaphragmatic, or omental disease appreciated. Peritonieal washings obtained.   Complications: None.  Implants: * No implants in log *      Plan:  - tylenol and oxy for pain control  - Rx of omeprazole  - incisions covered with steri strips and primapores - can shower in 24hrs, ok to remove primapores after 24hrs, leave steri strips in place  - ok to discharge from PACU

## 2024-09-30 NOTE — OP NOTE
Writer noted moderate amount of serous/serosanguinous drainage coming from right upper puncture site.  Page placed to MD Alvarado.  Resident to bedside to address this.  Dressings changed.  Pt cleared for discharge.    Mariana Ruggiero RN on 9/30/2024 at 12:05 PM

## 2024-09-30 NOTE — DISCHARGE INSTRUCTIONS
Contacting your Doctor -   To contact a doctor, call Dr. Willy Alvarado @ [phone]--Thoracic surgery clinic   or:  [phone] and ask for the resident on call for thoracic (answered 24 hours a day)   Emergency Department:  [place]: [phone]  [facility]: [phone] 911 if you are in need of immediate or emergent help

## 2024-09-30 NOTE — ANESTHESIA PROCEDURE NOTES
Airway       Patient location during procedure: OR       Procedure Start/Stop Times: 9/30/2024 7:35 AM  Staff -        Anesthesiologist:  Nancy Adan MD       Resident/Fellow: Maria E Alcazar MD       Performed By: resident and with residents       Procedure performed by resident/fellow/CRNA in presence of a teaching physician.    Consent for Airway        Urgency: elective  Indications and Patient Condition       Indications for airway management: geo-procedural       Induction type:intravenous       Mask difficulty assessment: 2 - vent by mask + OA or adjuvant +/- NMBA    Final Airway Details       Final airway type: endotracheal airway       Successful airway: ETT - single  Endotracheal Airway Details        ETT size (mm): 7.5       Cuffed: yes       Successful intubation technique: direct laryngoscopy and video laryngoscopy       VL Blade Size: MAC 4       Grade View of Cords: 1       Adjucts: stylet       Position: Right       Measured from: lips       Secured at (cm): 23       Bite block used: Soft    Post intubation assessment        Placement verified by: capnometry, equal breath sounds and chest rise        Number of attempts at approach: 1       Number of other approaches attempted: 0       Secured with: tape       Ease of procedure: easy       Dentition: Intact    Medication(s) Administered   Medication Administration Time: 9/30/2024 7:35 AM

## 2024-09-30 NOTE — ANESTHESIA CARE TRANSFER NOTE
Patient: Prashant Allen    Procedure: Procedure(s):  DIAGNOSTIC LAPAROSCOPY with biopsy and lavage  Esophagoscopy, gastroscopy, duodenoscopy (EGD)       Diagnosis: Malignant neoplasm of overlapping sites of stomach [C16.8]  Diagnosis Additional Information: No value filed.    Anesthesia Type:   General     Note:    Oropharynx: oropharynx clear of all foreign objects  Level of Consciousness: awake  Oxygen Supplementation: face mask  Level of Supplemental Oxygen (L/min / FiO2): 4  Independent Airway: airway patency satisfactory and stable  Dentition: dentition unchanged  Vital Signs Stable: post-procedure vital signs reviewed and stable  Report to RN Given: handoff report given  Patient transferred to: PACU    Handoff Report: Identifed the Patient, Identified the Reponsible Provider, Reviewed the pertinent medical history, Discussed the surgical course, Reviewed Intra-OP anesthesia mangement and issues during anesthesia, Set expectations for post-procedure period and Allowed opportunity for questions and acknowledgement of understanding      Vitals:  Vitals Value Taken Time   /126 09/30/24 0957   Temp     Pulse 102 09/30/24 1000   Resp 17 09/30/24 1000   SpO2 100 % 09/30/24 1000   Vitals shown include unfiled device data.    Electronically Signed By: Maria E Alcazar MD  September 30, 2024  10:00 AM   PACU Discharge Note    Current Allergies: Influenza vaccines; Albumen, egg; and Keflex [cephalexin]    Pt meets criteria for discharge to home per Bozena Score and ASPAN standards. Discussed with patient and responsible individual receiving instructions how to measure pain per numerical scale and when to contact doctor if prescribed medications are not helping with post operative pain    Discharge instructions reviewed with patient and family. Both verbalized understanding of instructions. Gave patient and family opportunity to ask questions. All questions reviewed and answered. Documents signed and copy of discharge instructions given. Vitals:    10/20/22 1945   BP: 110/75   Pulse: 80   Resp: 16   Temp: 98.4 °F (36.9 °C)   SpO2: 99%      BP within 20% of pt's admitting BP per BOZENA SCORE      Intake/Output Summary (Last 24 hours) at 10/20/2022 2040  Last data filed at 10/20/2022 1945  Gross per 24 hour   Intake 2010 ml   Output 100 ml   Net 1910 ml         Pain assessment:  present - adequately treated  Pain Level: 4    Offered patient opportunity to use restroom prior to discharge. Patient previously used restroom    Patient discharged to home/self care via wheel chair by transporter/RN with a responsible individual. Extra surgical bra sent home with patient along with measuring cups for YANN's and instructions on how to do along with the importance in recording output.  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       10/20/2022 8:40 PM

## 2024-09-30 NOTE — OP NOTE
Preoperative diagnosis: Esophageal cancer    Postoperative diagnosis: The same as preop    Procedure performed:    1. Diagnostic laparoscopy with peritoneal biopsies and washing    2. Esophagogastroduodenoscopy    Surgeon: Willy Alvarado (present and participated in the entire procedure)    Resident Surgeon: Carrie Hatfield    Anesthesia: General    EBL: 5 ml    Complications: None    Findings:   Gross evidence of tumor growth into the perigastric fat, but no evidence of gross invasion of the mesocolon. The gross extent of the tumor by laparoscopy and endoscopy is to the level of the pylorus without obvious invasion into the duodenum. Small LEFT diaphragmatic white nodule which on frozen section were negative for malignancy. No obvious metastatic disease. The proximal extent of the tumor involved less than 2 cm of the distal esophagus.     Specimens: Diaphragmatic nodules, peritoneal washings      Procedure    We positioned Prashant AN Allen in supine and the abdomen and chest were prepared and draped in the conventional fashion.    We then used a 4 port approach and the first port was placed with open technique and fascial stitches for eventual closure.  Next, we examined the peritoneal cavity and there is no evidence of metastatic disease.    We identified some tiny left diaphragmatic nodules that we sampled and were negative on frozen section.    Next, we opened the gastrocolic ligament close to the level of the origin of the right gastroepiploic artery and the transverse colon and mesocolon were free, but the area of the gastroepiploic artery origin felt thickened.    We irrigated with 3 liters of warm saline and suctioned out about 75% of the fluid and sent it for cytology.    At the end of the procedure we closed with absorbable sutures and performed an esophagogastroduodenoscopy with the above-mentioned findings.    Prashant Allen tolerated the procedure well.

## 2024-10-01 LAB
PATH REPORT.COMMENTS IMP SPEC: NORMAL
PATH REPORT.FINAL DX SPEC: NORMAL
PATH REPORT.FINAL DX SPEC: NORMAL
PATH REPORT.GROSS SPEC: NORMAL
PATH REPORT.GROSS SPEC: NORMAL
PATH REPORT.MICROSCOPIC SPEC OTHER STN: NORMAL
PATH REPORT.MICROSCOPIC SPEC OTHER STN: NORMAL
PATH REPORT.RELEVANT HX SPEC: NORMAL
PATH REPORT.SITE OF ORIGIN SPEC: NORMAL

## 2024-10-04 LAB
PATH REPORT.COMMENTS IMP SPEC: NORMAL
PATH REPORT.COMMENTS IMP SPEC: NORMAL
PATH REPORT.FINAL DX SPEC: NORMAL
PATH REPORT.GROSS SPEC: NORMAL
PATH REPORT.INTRAOP OBS SPEC DOC: NORMAL
PATH REPORT.MICROSCOPIC SPEC OTHER STN: NORMAL
PATH REPORT.RELEVANT HX SPEC: NORMAL
PHOTO IMAGE: NORMAL

## 2024-10-06 ENCOUNTER — HEALTH MAINTENANCE LETTER (OUTPATIENT)
Age: 34
End: 2024-10-06

## 2024-10-25 ENCOUNTER — PATIENT OUTREACH (OUTPATIENT)
Dept: SURGERY | Facility: CLINIC | Age: 34
End: 2024-10-25
Payer: COMMERCIAL

## 2024-10-25 NOTE — TELEPHONE ENCOUNTER
[facility]: Thoracic Surgery                                                                                       Called patient to introduce self and role as Nurse Coordinator with Dr Alvarado in Thoracic Surgery at [facility].   Reviewed with patient the plan to follow up with Dr Alvarado post-chemo for possible surgery. Patient states his last round of chemo is anticipated to be in December. Has a 6 week follow up appointment with Dr Alvarado on 11/6 from his recent EGD. Reports that he would like to still keep that appointment. Informed patient that the peritoneal biopsy and washings from his recent EGD were all negative and Dr Alvarado had spoken with his oncologist about the results. Patient verbalized his understanding and appreciated writer's call.     Patient verbalized that he provides consent for [facility] to speak with his sister Hemalatha Tavarez. Informed patient that writer will update his demographics but he will need to sign a consent form when at his next visit.     Provided patient with writer's direct call back information.     Yamileth Douglas RN, BSN  Thoracic Surgery RN Care Coordinator

## 2024-11-05 NOTE — PROGRESS NOTES
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     HPI  Prashant Allen is a 34 year old male who is referred with the diagnosis of an adenocarcinoma of the GEJ with suspected linitis plastica involving the entire stomach until ~ 1 cm proximal to the pylorus. Endoscopic biopsies of the suspected LP were inconclusive. Of note, the EUS scope could not pass the prepyloric area due to gastric wall thickening, the regular scope could pass.  Prashant has started FLOT chemotherapy regimen, first cycle on 10/10/2024, planning for chemo cycle every 3 weeks. He'll complete this regimen December 12, 2024. He is tolerating chemotherapy mild nausea but no vomiting. His appetite is poor but he is no longer losing weight. Functionally, he continues to work and can climb multiple flights of stairs without become dyspneic or fatigued.     Previsit Tests   EGD and diagnostic laparoscopy (9/30/2024): Gross evidence of tumor in the perigastric fat but no the mesocolon; peritoneal washings without evidence of malignancy. The very distal portion of the stomach appears uninvolved by tumor suggesting the possibility of a grossly negative resection just distal to the pylrous.  EGD (8/30/2024): Non-circumferential mass at 40 cm starting at the z-line, nodular appearing gastric mucosa, normal appearing D1 and D2.  Biopsy (8/30/2024, reviewed at [facility]): Poorly differentiated adenocarcinoma, intact MMR  EUS (Dr. Vela):   The GEJ lesion was ~ 2cm in length and did not invade the muscularis propria. Seven small LN (< 10 mm) in the LGA area, one was sampled (no sonographic description of the LN, but interpreted as possible N3). uT1N3. FNA of LN reportedly negative.  The GEJ mass appears to be separate from the linitis plastica involving >10 of the distal stomach. The gastric wall was up to 1.3  cm thick, and the very distal portion of the gastric wall transitioned to normal close to the pylorus.   EGD with biopsy of antrum (Dr. Vela 2024): No evidence of malignancy  PET-CT (2024): GEJ SUV ~10, diffuse gastric SUV ~4.5    Antral PET positive thickening to the pre-pyloric area and a PET positive nodular area extending into the surrounding fat          PMH  Reviewed, as below    No past medical history on file.     PSH  Reviewed, as below    Past Surgical History:   Procedure Laterality Date    ESOPHAGOSCOPY, GASTROSCOPY, DUODENOSCOPY (EGD), COMBINED N/A 2024    Procedure: Esophagoscopy, gastroscopy, duodenoscopy (EGD);  Surgeon: Willy Alvarado MD;  Location: [facility] OR    LAPAROSCOPY DIAGNOSTIC (GENERAL) N/A 2024    Procedure: DIAGNOSTIC LAPAROSCOPY with biopsy and lavage;  Surgeon: Willy Alvarado MD;  Location:  OR          Mother  at 31 from breast cancer. Many maternal relatives have cancers and several are CHEK2 positive.  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    Current Outpatient Medications   Medication Sig Dispense Refill    omeprazole (PRILOSEC) 40 MG DR capsule Take 1 capsule (40 mg) by mouth daily. 60 capsule 0     No current facility-administered medications for this visit.      No Known Allergies       ETOH Rare; couple of times per year  TOB Never smoker    Physical examination  /79 (BP Location: Right arm, Patient Position: Sitting, Cuff Size: Adult Regular)   Pulse 56   Temp 98.3  F (36.8  C) (Oral)   Resp 24   Wt 93.9 kg (207 lb 1.6 oz)   SpO2 98%   BMI 26.59 kg/m        Awake and alert, no acute distress  Normal work of breathing on room air, chest wall rise equal and symmetric  Regular rate and rhythm to peripheral palpation, warm and well perfused peripherally  Abdomen soft, nontender, nondistended  No jaundice, rash, erythema of the skin  No focal neurologic deficit    From a personal perspective, he works as a . He is  here with his aunt, Nancy, who is also his adoptive mother. Prashant's mother  of breast cancer when he was 6.      Code Status: Full Code    Health Care Proxy: He has not yet filled out a directive, but he knows that it is available on [employer].    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 I reviewed the plan as follows:    1) Repeat PET at [facility] 4 weeks after FLOT with clinic follow up appointment at that time  2) Scheduled total gastrectomy, esophagectomy, alethea-en-y esophagojejunostomy, jejunostomy tube placement for early 2025      I appreciate the opportunity to participate in the care of your patient and will keep you updated.      Sincerely,    Willy Alvarado MD

## 2024-11-06 ENCOUNTER — ONCOLOGY VISIT (OUTPATIENT)
Dept: SURGERY | Facility: CLINIC | Age: 34
End: 2024-11-06
Attending: THORACIC SURGERY (CARDIOTHORACIC VASCULAR SURGERY)
Payer: COMMERCIAL

## 2024-11-06 ENCOUNTER — PREP FOR PROCEDURE (OUTPATIENT)
Dept: SURGERY | Facility: CLINIC | Age: 34
End: 2024-11-06
Payer: COMMERCIAL

## 2024-11-06 VITALS
WEIGHT: 207.1 LBS | TEMPERATURE: 98.3 F | SYSTOLIC BLOOD PRESSURE: 122 MMHG | HEART RATE: 56 BPM | BODY MASS INDEX: 26.59 KG/M2 | OXYGEN SATURATION: 98 % | RESPIRATION RATE: 24 BRPM | DIASTOLIC BLOOD PRESSURE: 79 MMHG

## 2024-11-06 DIAGNOSIS — C16.0 SIEWERT TYPE III ADENOCARCINOMA OF ESOPHAGOGASTRIC JUNCTION (H): Primary | ICD-10-CM

## 2024-11-06 DIAGNOSIS — C16.0 SIEWERT TYPE III ADENOCARCINOMA OF GASTROESOPHAGEAL JUNCTION (H): Primary | ICD-10-CM

## 2024-11-06 PROCEDURE — 99213 OFFICE O/P EST LOW 20 MIN: CPT | Performed by: THORACIC SURGERY (CARDIOTHORACIC VASCULAR SURGERY)

## 2024-11-06 RX ORDER — DEXAMETHASONE 4 MG/1
4 TABLET ORAL
COMMUNITY
Start: 2024-10-01

## 2024-11-06 RX ORDER — PROCHLORPERAZINE MALEATE 10 MG
10 TABLET ORAL
COMMUNITY
Start: 2024-10-01

## 2024-11-06 RX ORDER — ACETAMINOPHEN 325 MG/1
975 TABLET ORAL ONCE
OUTPATIENT
Start: 2024-11-06 | End: 2024-11-06

## 2024-11-06 RX ORDER — ONDANSETRON 8 MG/1
8 TABLET, FILM COATED ORAL
COMMUNITY
Start: 2024-10-01

## 2024-11-06 RX ORDER — HEPARIN SODIUM 10000 [USP'U]/ML
5000 INJECTION, SOLUTION INTRAVENOUS; SUBCUTANEOUS
OUTPATIENT
Start: 2024-11-06

## 2024-11-06 RX ORDER — LIDOCAINE/PRILOCAINE 2.5 %-2.5%
CREAM (GRAM) TOPICAL
COMMUNITY
Start: 2024-10-02

## 2024-11-06 ASSESSMENT — PAIN SCALES - GENERAL: PAINLEVEL_OUTOF10: NO PAIN (0)

## 2024-11-06 NOTE — LETTER
11/6/2024      Prashant Allen  [address]  [place] [place] [facility] [address]      Dear Colleague,    Thank you for referring your patient, Prashant Allen, to the [facility]. Please see a copy of my visit note below.    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     HPI  Prashant Allen is a 34 year old male who is referred with the diagnosis of an adenocarcinoma of the GEJ with suspected linitis plastica involving the entire stomach until ~ 1 cm proximal to the pylorus. Endoscopic biopsies of the suspected LP were inconclusive. Of note, the EUS scope could not pass the prepyloric area due to gastric wall thickening, the regular scope could pass.  Prashant has started FLOT chemotherapy regimen, first cycle on 10/10/2024, planning for chemo cycle every 3 weeks. He'll complete this regimen December 12, 2024. He is tolerating chemotherapy mild nausea but no vomiting. His appetite is poor but he is no longer losing weight. Functionally, he continues to work and can climb multiple flights of stairs without become dyspneic or fatigued.     Previsit Tests   EGD and diagnostic laparoscopy (9/30/2024): Gross evidence of tumor in the perigastric fat but no the mesocolon; peritoneal washings without evidence of malignancy. The very distal portion of the stomach appears uninvolved by tumor suggesting the possibility of a grossly negative resection just distal to the pylrous.  EGD (8/30/2024): Non-circumferential mass at 40 cm starting at the z-line, nodular appearing gastric mucosa, normal appearing D1 and D2.  Biopsy (8/30/2024, reviewed at [facility]): Poorly differentiated adenocarcinoma, intact MMR  EUS (Dr. Vela):   The GEJ lesion was ~ 2cm in length and did not invade the muscularis propria. Seven small LN (< 10 mm) in the LGA area, one was sampled (no sonographic  description of the LN, but interpreted as possible N3). uT1N3. FNA of LN reportedly negative.  The GEJ mass appears to be separate from the linitis plastica involving >10 of the distal stomach. The gastric wall was up to 1.3 cm thick, and the very distal portion of the gastric wall transitioned to normal close to the pylorus.   EGD with biopsy of antrum (Dr. Vela 2024): No evidence of malignancy  PET-CT (2024): GEJ SUV ~10, diffuse gastric SUV ~4.5    Antral PET positive thickening to the pre-pyloric area and a PET positive nodular area extending into the surrounding fat          PMH  Reviewed, as below    No past medical history on file.     PSH  Reviewed, as below    Past Surgical History:   Procedure Laterality Date     ESOPHAGOSCOPY, GASTROSCOPY, DUODENOSCOPY (EGD), COMBINED N/A 2024    Procedure: Esophagoscopy, gastroscopy, duodenoscopy (EGD);  Surgeon: Willy Alvarado MD;  Location: [facility] OR     LAPAROSCOPY DIAGNOSTIC (GENERAL) N/A 2024    Procedure: DIAGNOSTIC LAPAROSCOPY with biopsy and lavage;  Surgeon: Willy Alvarado MD;  Location: [facility] OR          Mother  at 31 from breast cancer. Many maternal relatives have cancers and several are CHEK2 positive.  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    Current Outpatient Medications   Medication Sig Dispense Refill     omeprazole (PRILOSEC) 40 MG DR capsule Take 1 capsule (40 mg) by mouth daily. 60 capsule 0     No current facility-administered medications for this visit.      No Known Allergies       ETOH Rare; couple of times per year  TOB Never smoker    Physical examination  /79 (BP Location: Right arm, Patient Position: Sitting, Cuff Size: Adult Regular)   Pulse 56   Temp 98.3  F (36.8  C) (Oral)   Resp 24   Wt 93.9 kg (207 lb 1.6 oz)   SpO2 98%   BMI 26.59 kg/m        Awake and alert, no acute distress  Normal work of breathing on room air, chest wall rise equal and symmetric  Regular rate and rhythm to peripheral  palpation, warm and well perfused peripherally  Abdomen soft, nontender, nondistended  No jaundice, rash, erythema of the skin  No focal neurologic deficit    From a personal perspective, he works as a . He is here with his aunt, Nancy, who is also his adoptive mother. Prashant's mother  of breast cancer when he was 6.      Code Status: Full Code    Health Care Proxy: He has not yet filled out a directive, but he knows that it is available on [employer].    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 I reviewed the plan as follows:    1) Repeat PET at [facility] 4 weeks after FLOT with clinic follow up appointment at that time  2) Scheduled total gastrectomy, esophagectomy, alethea-en-y esophagojejunostomy, jejunostomy tube placement for early 2025      I appreciate the opportunity to participate in the care of your patient and will keep you updated.      Sincerely,    Wilyl Alvarado MD       Again, thank you for allowing me to participate in the care of your patient.        Sincerely,        Willy Alvarado MD

## 2024-11-06 NOTE — NURSING NOTE
"Oncology Rooming Note    November 6, 2024 9:40 AM   Prashant Allen is a 34 year old male who presents for:    Chief Complaint   Patient presents with    Oncology Clinic Visit     Malignant neoplasm of overlapping sites of stomach     Initial Vitals: /79 (BP Location: Right arm, Patient Position: Sitting, Cuff Size: Adult Regular)   Pulse 56   Temp 98.3  F (36.8  C) (Oral)   Resp 24   Wt 93.9 kg (207 lb 1.6 oz)   SpO2 98%   BMI 26.59 kg/m   Estimated body mass index is 26.59 kg/m  as calculated from the following:    Height as of 9/30/24: 1.88 m (6' 2\").    Weight as of this encounter: 93.9 kg (207 lb 1.6 oz). Body surface area is 2.21 meters squared.  No Pain (0) Comment: Data Unavailable   No LMP for male patient.  Allergies reviewed: Yes  Medications reviewed: Yes    Medications: Medication refills not needed today.  Pharmacy name entered into [employer]: [facility] - [address]    Frailty Screening:   Is the patient here for a new oncology consult visit in cancer care? 2. No      Clinical concerns: none.       Ignacio Brown"

## 2024-11-07 ENCOUNTER — TELEPHONE (OUTPATIENT)
Dept: SURGERY | Facility: CLINIC | Age: 34
End: 2024-11-07
Payer: COMMERCIAL

## 2024-11-07 NOTE — TELEPHONE ENCOUNTER
Called pt to offer 02/03 surgery date with Dr. Alvarado and got no answer. Left voicemail message with direct call back number [phone].    Paula Alvarez on 11/7/2024 at 3:24 PM

## 2024-11-14 DIAGNOSIS — C16.0 SIEWERT TYPE III ADENOCARCINOMA OF GASTROESOPHAGEAL JUNCTION (H): Primary | ICD-10-CM

## 2024-11-14 NOTE — TELEPHONE ENCOUNTER
Spoke with patient to schedule procedure with Dr. Alvarado   Procedure was scheduled on 02/03 at [facility] OR  Patient will have H&P with PAC    Informed pt of surgery details:  Date:    Monday, February 03, 2024  Arrival Time:  5:30 am    Pt is aware surgery start time may change, and someone will reach out with the new arrival time, if so.    Patient is aware a COVID-19 test is needed before their procedure ONLY IF symptomatic.   (Patient is aware Thoracic is no longer requiring COVID-19 test)       Patient is aware a / is needed day of surgery.   Surgery Letter was sent via [employer],     Patient has my direct contact information for any further questions.        Jan 20, 2025 10:15 AM  (Arrive by 10:00 AM)  PAC EVALUATION with Gela Prather PA-C  [facility] [place] ([facility] ) [phone]     Jan 20, 2025 11:15 AM  LAB with [facility]  [facility] [place] ([facility] ) [phone]     Mar 05, 2025 11:15 AM  (Arrive by 11:00 AM)  Return Patient with Willy Alvarado MD  [facility] ([facility] [place] ) [phone]

## 2024-11-15 NOTE — TELEPHONE ENCOUNTER
FUTURE VISIT INFORMATION      SURGERY INFORMATION:  Date: 2/3/25  [facility] or  Surgeon:  Willy Alvarado MD Diaz Gutierrez, Ilitch, MD   Anesthesia Type:  General with Block   Procedure: LAPAROTOMY AND LEFT THORACOTOMY WITH TOTAL GASTRECTOMY MELONY EN Y ESOPHAGOJEJUNOSTOMY, FEEDING JEJUNOSTOMY   Consult: ov 1/6/24    RECORDS REQUESTED FROM:       Primary Care Provider: Stevan Cedeño MD

## 2025-01-13 ENCOUNTER — HOSPITAL ENCOUNTER (OUTPATIENT)
Dept: PET IMAGING | Facility: CLINIC | Age: 35
Discharge: HOME | End: 2025-01-13
Attending: THORACIC SURGERY (CARDIOTHORACIC VASCULAR SURGERY) | Admitting: THORACIC SURGERY (CARDIOTHORACIC VASCULAR SURGERY)
Payer: COMMERCIAL

## 2025-01-13 DIAGNOSIS — C16.0 SIEWERT TYPE III ADENOCARCINOMA OF GASTROESOPHAGEAL JUNCTION (H): ICD-10-CM

## 2025-01-13 PROCEDURE — A9552 F18 FDG: HCPCS | Performed by: THORACIC SURGERY (CARDIOTHORACIC VASCULAR SURGERY)

## 2025-01-13 PROCEDURE — 78815 PET IMAGE W/CT SKULL-THIGH: CPT | Mod: 26 | Performed by: RADIOLOGY

## 2025-01-13 PROCEDURE — 71260 CT THORAX DX C+: CPT | Mod: 26 | Performed by: RADIOLOGY

## 2025-01-13 PROCEDURE — 74177 CT ABD & PELVIS W/CONTRAST: CPT | Mod: 26 | Performed by: RADIOLOGY

## 2025-01-13 PROCEDURE — 74177 CT ABD & PELVIS W/CONTRAST: CPT

## 2025-01-13 PROCEDURE — 343N000001 HC RX 343 MED OP 636: Performed by: THORACIC SURGERY (CARDIOTHORACIC VASCULAR SURGERY)

## 2025-01-13 PROCEDURE — 78815 PET IMAGE W/CT SKULL-THIGH: CPT | Mod: PS

## 2025-01-13 PROCEDURE — 250N000011 HC RX IP 250 OP 636: Performed by: THORACIC SURGERY (CARDIOTHORACIC VASCULAR SURGERY)

## 2025-01-13 PROCEDURE — 71260 CT THORAX DX C+: CPT

## 2025-01-13 RX ORDER — IOPAMIDOL 755 MG/ML
45-135 INJECTION, SOLUTION INTRAVASCULAR ONCE
Status: COMPLETED | OUTPATIENT
Start: 2025-01-13 | End: 2025-01-13

## 2025-01-13 RX ORDER — FLUDEOXYGLUCOSE F 18 200 MCI/ML
10-18 INJECTION, SOLUTION INTRAVENOUS ONCE
Status: COMPLETED | OUTPATIENT
Start: 2025-01-13 | End: 2025-01-13

## 2025-01-13 RX ADMIN — FLUDEOXYGLUCOSE F 18 12.88 MILLICURIE: 200 INJECTION, SOLUTION INTRAVENOUS at 11:36

## 2025-01-13 RX ADMIN — IOPAMIDOL 127 ML: 755 INJECTION, SOLUTION INTRAVENOUS at 12:28

## 2025-01-14 LAB
ABO + RH BLD: NORMAL
BLD GP AB SCN SERPL QL: NEGATIVE
SPECIMEN EXP DATE BLD: NORMAL

## 2025-01-15 ENCOUNTER — ONCOLOGY VISIT (OUTPATIENT)
Dept: SURGERY | Facility: CLINIC | Age: 35
End: 2025-01-15
Attending: THORACIC SURGERY (CARDIOTHORACIC VASCULAR SURGERY)
Payer: COMMERCIAL

## 2025-01-15 ENCOUNTER — LAB (OUTPATIENT)
Dept: LAB | Facility: CLINIC | Age: 35
End: 2025-01-15
Payer: COMMERCIAL

## 2025-01-15 ENCOUNTER — OFFICE VISIT (OUTPATIENT)
Dept: SURGERY | Facility: CLINIC | Age: 35
End: 2025-01-15
Payer: COMMERCIAL

## 2025-01-15 ENCOUNTER — ANESTHESIA EVENT (OUTPATIENT)
Dept: SURGERY | Facility: CLINIC | Age: 35
End: 2025-01-15
Payer: COMMERCIAL

## 2025-01-15 VITALS
WEIGHT: 225.6 LBS | RESPIRATION RATE: 16 BRPM | BODY MASS INDEX: 28.97 KG/M2 | SYSTOLIC BLOOD PRESSURE: 134 MMHG | DIASTOLIC BLOOD PRESSURE: 82 MMHG | OXYGEN SATURATION: 97 % | HEART RATE: 83 BPM

## 2025-01-15 VITALS
DIASTOLIC BLOOD PRESSURE: 97 MMHG | WEIGHT: 225.53 LBS | RESPIRATION RATE: 16 BRPM | BODY MASS INDEX: 28.94 KG/M2 | SYSTOLIC BLOOD PRESSURE: 157 MMHG | OXYGEN SATURATION: 99 % | HEIGHT: 74 IN | TEMPERATURE: 97.4 F | HEART RATE: 76 BPM

## 2025-01-15 DIAGNOSIS — Z01.818 PREOP EXAMINATION: ICD-10-CM

## 2025-01-15 DIAGNOSIS — R73.09 ELEVATED GLUCOSE: ICD-10-CM

## 2025-01-15 DIAGNOSIS — C16.0 SIEWERT TYPE III ADENOCARCINOMA OF GASTROESOPHAGEAL JUNCTION (H): Primary | ICD-10-CM

## 2025-01-15 DIAGNOSIS — C16.9 LINITIS PLASTICA (H): ICD-10-CM

## 2025-01-15 DIAGNOSIS — Z01.818 PREOP EXAMINATION: Primary | ICD-10-CM

## 2025-01-15 DIAGNOSIS — C16.0 SIEWERT TYPE III ADENOCARCINOMA OF ESOPHAGOGASTRIC JUNCTION (H): ICD-10-CM

## 2025-01-15 DIAGNOSIS — C16.0 SIEWERT TYPE III ADENOCARCINOMA OF GASTROESOPHAGEAL JUNCTION (H): ICD-10-CM

## 2025-01-15 LAB
ANION GAP SERPL CALCULATED.3IONS-SCNC: 11 MMOL/L (ref 7–15)
BUN SERPL-MCNC: 13.9 MG/DL (ref 6–20)
CALCIUM SERPL-MCNC: 9.5 MG/DL (ref 8.8–10.4)
CHLORIDE SERPL-SCNC: 107 MMOL/L (ref 98–107)
CREAT SERPL-MCNC: 0.93 MG/DL (ref 0.67–1.17)
EGFRCR SERPLBLD CKD-EPI 2021: >90 ML/MIN/1.73M2
ERYTHROCYTE [DISTWIDTH] IN BLOOD BY AUTOMATED COUNT: 15.4 % (ref 10–15)
EST. AVERAGE GLUCOSE BLD GHB EST-MCNC: 117 MG/DL
GLUCOSE SERPL-MCNC: 102 MG/DL (ref 70–99)
HBA1C MFR BLD: 5.7 %
HCO3 SERPL-SCNC: 25 MMOL/L (ref 22–29)
HCT VFR BLD AUTO: 47 % (ref 40–53)
HGB BLD-MCNC: 15.6 G/DL (ref 13.3–17.7)
MCH RBC QN AUTO: 28.3 PG (ref 26.5–33)
MCHC RBC AUTO-ENTMCNC: 33.2 G/DL (ref 31.5–36.5)
MCV RBC AUTO: 85 FL (ref 78–100)
PLATELET # BLD AUTO: 216 10E3/UL (ref 150–450)
POTASSIUM SERPL-SCNC: 3.9 MMOL/L (ref 3.4–5.3)
RBC # BLD AUTO: 5.52 10E6/UL (ref 4.4–5.9)
SODIUM SERPL-SCNC: 143 MMOL/L (ref 135–145)
WBC # BLD AUTO: 9.7 10E3/UL (ref 4–11)

## 2025-01-15 PROCEDURE — 86901 BLOOD TYPING SEROLOGIC RH(D): CPT

## 2025-01-15 PROCEDURE — 80048 BASIC METABOLIC PNL TOTAL CA: CPT | Performed by: PATHOLOGY

## 2025-01-15 PROCEDURE — 83036 HEMOGLOBIN GLYCOSYLATED A1C: CPT | Performed by: NURSE PRACTITIONER

## 2025-01-15 PROCEDURE — 86900 BLOOD TYPING SEROLOGIC ABO: CPT

## 2025-01-15 PROCEDURE — 99213 OFFICE O/P EST LOW 20 MIN: CPT | Performed by: THORACIC SURGERY (CARDIOTHORACIC VASCULAR SURGERY)

## 2025-01-15 PROCEDURE — 99000 SPECIMEN HANDLING OFFICE-LAB: CPT | Performed by: PATHOLOGY

## 2025-01-15 PROCEDURE — 36415 COLL VENOUS BLD VENIPUNCTURE: CPT | Performed by: PATHOLOGY

## 2025-01-15 PROCEDURE — 85027 COMPLETE CBC AUTOMATED: CPT | Performed by: PATHOLOGY

## 2025-01-15 ASSESSMENT — PAIN SCALES - GENERAL
PAINLEVEL_OUTOF10: NO PAIN (0)
PAINLEVEL_OUTOF10: NO PAIN (0)

## 2025-01-15 ASSESSMENT — ENCOUNTER SYMPTOMS: ORTHOPNEA: 0

## 2025-01-15 NOTE — LETTER
1/15/2025      Prashant Allen  [address]  [facility] [place] [address]      Dear Colleague,    Thank you for referring your patient, Prashant Allen, to the [facility] CANCER [facility]. Please see a copy of my visit note below.    THORACIC SURGERY ESTABLISHED PATIENT VISIT      Dear Dr. 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      HPI  Prashant Allen is a 34 year old male who is referred with the diagnosis of an adenocarcinoma of the GEJ with suspected linitis plastica involving the entire stomach until ~ 1 cm proximal to the pylorus. Endoscopic biopsies of the suspected LP were inconclusive. Of note, the EUS scope could not pass the prepyloric area due to gastric wall thickening, the regular scope could pass.  Prashant has started FLOT chemotherapy regimen, first cycle on 10/10/2024, planning for chemo cycle every 3 weeks. He'll complete this regimen December 12, 2024. He tolerated chemotherapy with mild nausea but no vomiting. His appetite is much better. Prashant continues to work and can climb multiple flights of stairs without become dyspneic or fatigued.   Prashant is ready for surgery     Previsit Tests   EGD and diagnostic laparoscopy (9/30/2024): Gross evidence of tumor in the perigastric fat but no the mesocolon; peritoneal washings without evidence of malignancy. The very distal portion of the stomach appears uninvolved by tumor suggesting the possibility of a grossly negative resection just distal to the pylrous.  EGD (8/30/2024): Non-circumferential mass at 40 cm starting at the z-line, nodular appearing gastric mucosa, normal appearing D1 and D2.  Biopsy (8/30/2024, reviewed at [facility]): Poorly differentiated adenocarcinoma, intact MMR  EUS (Dr. Vela):   The GEJ lesion was ~ 2cm in length and did not invade the muscularis propria. Seven small LN (< 10 mm) in the LGA area, one was sampled (no sonographic description of the  LN, but interpreted as possible N3). uT1N3. FNA of LN reportedly negative.  The GEJ mass appears to be separate from the linitis plastica involving >10 of the distal stomach. The gastric wall was up to 1.3 cm thick, and the very distal portion of the gastric wall transitioned to normal close to the pylorus.   EGD with biopsy of antrum (Dr. Vela 2024): No evidence of malignancy  PET-CT (2024): GEJ SUV ~10, diffuse gastric SUV ~4.5    Antral PET positive thickening to the pre-pyloric area and a PET positive nodular area extending into the surrounding fat       PET-CT 2025: Good response       PMH  Reviewed, as below     Past Medical History   No past medical history on file.         PSH  Reviewed, as below     Past Surgical History         Past Surgical History:   Procedure Laterality Date     ESOPHAGOSCOPY, GASTROSCOPY, DUODENOSCOPY (EGD), COMBINED N/A 2024     Procedure: Esophagoscopy, gastroscopy, duodenoscopy (EGD);  Surgeon: Willy Alvarado MD;  Location: [facility] OR     LAPAROSCOPY DIAGNOSTIC (GENERAL) N/A 2024     Procedure: DIAGNOSTIC LAPAROSCOPY with biopsy and lavage;  Surgeon: Willy Alvarado MD;  Location:  OR              Mother  at 31 from breast cancer. Many maternal relatives have cancers and several are CHEK2 positive.  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     Current Facility-Administered Medications          Current Outpatient Medications   Medication Sig Dispense Refill     omeprazole (PRILOSEC) 40 MG DR capsule Take 1 capsule (40 mg) by mouth daily. 60 capsule 0      No current facility-administered medications for this visit.            Allergies   No Known Allergies            ETOH Rare; couple of times per year  TOB Never smoker     Physical examination  /82 (BP Location: Left arm, Patient Position: Sitting, Cuff Size: Adult Large)   Pulse 83   Resp 16   Wt 102.3 kg (225 lb 9.6 oz)   SpO2 97%   BMI 28.97 kg/m            Abdomen soft, nontender,  non-distended, well healed incisions       From a personal perspective, he works as a . He is here with his aunt, Nancy, who is also his adoptive mother. Prashant's mother  of breast cancer when he was 6.        Code Status: Full Code     Health Care Proxy: He has not yet filled out a directive, but he knows that it is available on [employer].     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 I reviewed the plan as follows:     Prashant is ready for surgery. We reviewed surgery and recovery again and he had all his questions answered.        I appreciate the opportunity to participate in the care of your patient and will keep you updated.        Sincerely,     Willy Alvarado MD        Again, thank you for allowing me to participate in the care of your patient.        Sincerely,        Willy Alvarado MD    Electronically signed

## 2025-01-15 NOTE — PATIENT INSTRUCTIONS
Preparing for Your Surgery      Name:  Prashant Allen   MRN:  [id number]   :  1990   Today's Date:  1/15/2025       Arriving for surgery:  Surgery date:  2/3/25  Arrival time:  5.30AM    Please come to:     Please come to:       [email or web address]    [address] [place]  [address]     The [facility] [place] Patient/Visitor Ramp is at [address]. Patients and visitors who self-park will receive the reduced hospital parking rate. If the Patient /Visitor Ramp is full, please follow the signs to the [employer] car park located at the main hospital entrance.       parking is available (24 hours/ 7 days a week)      Discounted parking pass options are available for patients and visitors. They can be purchased at the [employer] desk at the main hospital entrance.     -    Stop at the security desk and they will direct surgery patients to the [facility] [phone]        - If you need directions, a wheelchair or an escort please stop at the Information/security desk in the lobby.     What can I eat or drink?  -  You may eat and drink normally up to 8 hours prior to arrival time. (Until 9.30PM)  -  You may have clear liquids until 2 hours prior to arrival time. (Until 3.30AM)    Examples of clear liquids:  Water  Clear broth  Juices (apple, white grape, white cranberry  and cider) without pulp  Noncarbonated, powder based beverages  (lemonade and Mayo-Aid)  Sodas (Sprite, 7-Up, ginger ale and seltzer)  Coffee or tea (without milk or cream)  Gatorade    -  No Alcohol or cannabis products for at least 24 hours before surgery.     Which medicines can I take?    Hold Aspirin for 7 days before surgery.   Hold Multivitamins for 7 days before surgery.  Hold Supplements for 7 days before surgery.  Hold Ibuprofen (Advil, Motrin) for 1 day(s) before surgery--unless otherwise directed  by surgeon.  Hold Naproxen (Aleve) for 4 days before surgery.    -  DO NOT take these medications the day of surgery:  None     -  PLEASE TAKE these medications the day of surgery:  None     How do I prepare myself?  - Please take 2 showers (one the night prior to surgery and one the morning of surgery) using Scrubcare or Hibiclens soap.    Use this soap only from the neck to your toes. Avoid genital area      Leave the soap on your skin for one minute--then rinse thoroughly.      You may use your own shampoo and conditioner. No other hair products.   - Please remove all jewelry and body piercings.  - No lotions, deodorants or fragrance.  - No makeup or fingernail polish.   - Bring your ID and insurance card.    -If you use a CPAP machine, please bring the CPAP machine, tubing, and mask to hospital.    -If you have a Deep Brain Stimulator, Spinal Cord Stimulator, or any Neuro Stimulator device---you must bring the remote control to the hospital.      ALL PATIENTS GOING HOME THE SAME DAY OF SURGERY ARE REQUIRED TO HAVE A RESPONSIBLE ADULT TO DRIVE AND BE IN ATTENDANCE WITH THEM FOR 24 HOURS FOLLOWING SURGERY.    Covid testing policy as of 12/06/2022  Your surgeon will notify and schedule you for a COVID test if one is needed before surgery--please direct any questions or COVID symptoms to your surgeon      Questions or Concerns:    - For any questions regarding the day of surgery or your hospital stay, please contact the Pre Admission Nursing Office at [phone].       - If you have health changes between today and your surgery, please call your surgeon.       - For questions after surgery, please call your surgeons office.           Current Visitor Guidelines    You may have 2 visitors in the pre op area.    Visiting hours: 8 a.m. to 8:30 p.m.    Patients confirmed or suspected to have symptoms of COVID 19 or flu:     No visitors allowed for adult patients.   Children (under age 18) can have 1 named visitor.      People who are sick or showing symptoms of COVID 19 or flu:    Are not allowed to visit patients--we can only make exceptions in special situations.       Please follow these guidelines for your visit:          Please maintain social distance          Masking is optional--however at times you may be asked to wear a mask for the safety of yourself and others     Clean your hands with alcohol hand . Do this when you arrive at and leave the building and patient room,    And again after you touch your mask or anything in the room.     Go directly to and from the room you are visiting.     Stay in the patient s room during your visit. Limit going to other places in the hospital as much as possible     Leave bags and jackets at home or in the car.     For everyone s health, please don t come and go during your visit. That includes for smoking   during your visit.

## 2025-01-15 NOTE — PROGRESS NOTES
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      HPI  Prashant Allen is a 34 year old male who is referred with the diagnosis of an adenocarcinoma of the GEJ with suspected linitis plastica involving the entire stomach until ~ 1 cm proximal to the pylorus. Endoscopic biopsies of the suspected LP were inconclusive. Of note, the EUS scope could not pass the prepyloric area due to gastric wall thickening, the regular scope could pass.  Prasahnt has started FLOT chemotherapy regimen, first cycle on 10/10/2024, planning for chemo cycle every 3 weeks. He'll complete this regimen December 12, 2024. He is tolerating chemotherapy mild nausea but no vomiting. His appetite is poor but he is no longer losing weight. Functionally, he continues to work and can climb multiple flights of stairs without become dyspneic or fatigued.      Previsit Tests   EGD and diagnostic laparoscopy (9/30/2024): Gross evidence of tumor in the perigastric fat but no the mesocolon; peritoneal washings without evidence of malignancy. The very distal portion of the stomach appears uninvolved by tumor suggesting the possibility of a grossly negative resection just distal to the pylrous.  EGD (8/30/2024): Non-circumferential mass at 40 cm starting at the z-line, nodular appearing gastric mucosa, normal appearing D1 and D2.  Biopsy (8/30/2024, reviewed at [facility]): Poorly differentiated adenocarcinoma, intact MMR  EUS (Dr. Vela):   The GEJ lesion was ~ 2cm in length and did not invade the muscularis propria. Seven small LN (< 10 mm) in the LGA area, one was sampled (no sonographic description of the LN, but interpreted as possible N3). uT1N3. FNA of LN reportedly negative.  The GEJ mass appears to be separate from the linitis plastica involving >10 of the distal stomach. The gastric wall was up to  1.3 cm thick, and the very distal portion of the gastric wall transitioned to normal close to the pylorus.   EGD with biopsy of antrum (Dr. Vela 2024): No evidence of malignancy  PET-CT (2024): GEJ SUV ~10, diffuse gastric SUV ~4.5    Antral PET positive thickening to the pre-pyloric area and a PET positive nodular area extending into the surrounding fat       PET-CT 2025: Good response       PMH  Reviewed, as below     Past Medical History   No past medical history on file.         PSH  Reviewed, as below     Past Surgical History         Past Surgical History:   Procedure Laterality Date    ESOPHAGOSCOPY, GASTROSCOPY, DUODENOSCOPY (EGD), COMBINED N/A 2024     Procedure: Esophagoscopy, gastroscopy, duodenoscopy (EGD);  Surgeon: Willy Alvarado MD;  Location: [facility]    LAPAROSCOPY DIAGNOSTIC (GENERAL) N/A 2024     Procedure: DIAGNOSTIC LAPAROSCOPY with biopsy and lavage;  Surgeon: Willy Alvarado MD;  Location:  OR              Mother  at 31 from breast cancer. Many maternal relatives have cancers and several are CHEK2 positive.  Blessing tested positive for CHEK2.     Current Facility-Administered Medications          Current Outpatient Medications   Medication Sig Dispense Refill    omeprazole (PRILOSEC) 40 MG DR capsule Take 1 capsule (40 mg) by mouth daily. 60 capsule 0      No current facility-administered medications for this visit.            Allergies   No Known Allergies            ETOH Rare; couple of times per year  TOB Never smoker     Physical examination  /79 (BP Location: Right arm, Patient Position: Sitting, Cuff Size: Adult Regular)   Pulse 56   Temp 98.3  F (36.8  C) (Oral)   Resp 24   Wt 93.9 kg (207 lb 1.6 oz)   SpO2 98%   BMI 26.59 kg/m          Awake and alert, no acute distress  Normal work of breathing on room air, chest wall rise equal and symmetric  Regular rate and rhythm to peripheral palpation, warm and well perfused  peripherally  Abdomen soft, nontender, nondistended  No jaundice, rash, erythema of the skin  No focal neurologic deficit     From a personal perspective, he works as a . He is here with his aunt, Nancy, who is also his adoptive mother. Prashant's mother  of breast cancer when he was 6.        Code Status: Full Code     Health Care Proxy: He has not yet filled out a directive, but he knows that it is available on [employer].     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 I reviewed the plan as follows:     1) Repeat PET at [facility] 4 weeks after FLOT with clinic follow up appointment at that time  2) Scheduled total gastrectomy, esophagectomy, alethea-en-y esophagojejunostomy, jejunostomy tube placement for early 2025        I appreciate the opportunity to participate in the care of your patient and will keep you updated.        Sincerely,     Willy Alvarado MD

## 2025-01-15 NOTE — H&P
Pre-Operative H & P     CC:  Preoperative exam to assess for increased cardiopulmonary risk while undergoing surgery and anesthesia.    Date of Encounter: 1/15/2025  Primary Care Physician:  Stevan Cedeño     Reason for visit:   Encounter Diagnoses   Name Primary?    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  This was diagnosed after he presented with nausea, early satiety and anorexia at his local health care facility in [place].  He has since had chemotherapy (last was 12/13/24) and now the above listed procedure has been recommended for further treatment.     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)        Past Surgical History  Past Surgical History:   Procedure Laterality Date    ESOPHAGOSCOPY, GASTROSCOPY, DUODENOSCOPY (EGD), COMBINED N/A 09/30/2024    Procedure: Esophagoscopy, gastroscopy, duodenoscopy (EGD);  Surgeon: Willy Alvarado MD;  Location: [facility]    LAPAROSCOPY  DIAGNOSTIC (GENERAL) N/A 09/30/2024    Procedure: DIAGNOSTIC LAPAROSCOPY with biopsy and lavage;  Surgeon: Willy Alvarado MD;  Location: [facility] OR    UPPER GI ENDOSCOPY         Prior to Admission Medications  Current Outpatient Medications   Medication Sig Dispense Refill    lidocaine-prilocaine (EMLA) 2.5-2.5 % external cream Place quarter size amount of cream to port site 30-45 minutes prior to arrival for treatment      dexAMETHasone (DECADRON) 4 MG tablet Take 4 mg by mouth. (Patient not taking: Reported on 1/15/2025)      ondansetron (ZOFRAN) 8 MG tablet Take 8 mg by mouth. (Patient not taking: Reported on 1/15/2025)      prochlorperazine (COMPAZINE) 10 MG tablet Take 10 mg by mouth. (Patient not taking: Reported on 1/15/2025)         Allergies  No Known Allergies    Social History  Social History     Socioeconomic History    Marital status: Single     Spouse name: Not on file    Number of children: Not on file    Years of education: Not on file    Highest education level: Not on file   Occupational History    Occupation: medical    Tobacco Use    Smoking status: Never    Smokeless tobacco: Never   Substance and Sexual Activity    Alcohol use: Not Currently     Comment: wine liquor not often    Drug use: Never    Sexual activity: Not on file   Other Topics Concern    Not on file   Social History Narrative    Not on file     Social Drivers of Health     Financial Resource Strain: Not on file   Food Insecurity: Not on file   Transportation Needs: Not on file   Physical Activity: Not on file   Stress: Not on file   Social Connections: Not on file   Interpersonal Safety: Not At Risk (10/7/2024)    Received from [facility]    Humiliation, Afraid, Rape, and Kick questionnaire     Fear of Current or Ex-Partner: No     Emotionally Abused: No     Physically Abused: No     Sexually Abused: No   Housing Stability: Not on file       Family History  Family History   Problem Relation Age of  "Onset    Breast Cancer Mother     Unknown/Adopted Father     Anesthesia Reaction No family hx of     Thrombosis No family hx of        Review of Systems  The complete review of systems is negative other than noted in the HPI or here.   Anesthesia Evaluation   Pt has had prior anesthetic.     No history of anesthetic complications       ROS/MED HX  ENT/Pulmonary:     (+)     ALDEN risk factors, snores loudly,                               (-) recent URI   Neurologic:  - neg neurologic ROS     Cardiovascular: Comment: Intermittent \"borderline\" hypertension.      (+)  - -   -  - -                                 No previous cardiac testing  (-) PHELPS and orthopnea/PND   METS/Exercise Tolerance: >4 METS Comment: Doesn't exercise purposefully, but is active walking, pushing, pulling, lifting, etc on the manufacturing floor.  Can ascend a flight of stairs.    Denies any exertional dyspnea or angina.    Hematologic:  - neg hematologic  ROS     Musculoskeletal:  - neg musculoskeletal ROS     GI/Hepatic: Comment: Adenocarcinoma of esophageal junction        Renal/Genitourinary:  - neg Renal ROS     Endo:  - neg endo ROS     Psychiatric/Substance Use:  - neg psychiatric ROS     Infectious Disease:  - neg infectious disease ROS     Malignancy:   (+) Malignancy, History of GI.GI CA  Active status post Chemo.      Other: Comment: Left chest portacath           BP (!) 157/97 (BP Location: Right arm, Patient Position: Sitting, Cuff Size: Adult Regular)   Pulse 76   Temp 97.4  F (36.3  C) (Oral)   Resp 16   Ht 1.88 m (6' 2\")   Wt 102.3 kg (225 lb 8.5 oz)   SpO2 99%   BMI 28.96 kg/m      Physical Exam   Constitutional: Awake, alert, cooperative, no apparent distress, and appears stated age.  Eyes: Pupils equal, round and reactive to light, extra ocular muscles intact, sclera clear, conjunctiva normal.  HENT: Normocephalic, oral pharynx with moist mucus membranes, good dentition. No goiter appreciated.   Respiratory: Clear to " auscultation bilaterally, no crackles or wheezing.  Cardiovascular: Regular rate and rhythm, normal S1 and S2, and no murmur noted.  Carotids +2, no bruits. No edema. Palpable pulses to radial  DP and PT arteries.   GI: Normal bowel sounds, soft, non-distended, non-tender, no masses palpated, no hepatosplenomegaly.    Lymph/Hematologic: No cervical lymphadenopathy and no supraclavicular lymphadenopathy.  Genitourinary:  deferred  Skin: Warm and dry.    Musculoskeletal: Full ROM of neck. There is no redness, warmth, or swelling of the exposed joints. Gross motor strength is normal.    Neurologic: Awake, alert, oriented to name, place and time. Cranial nerves II-XII are grossly intact. Gait is normal.   Neuropsychiatric: Calm, cooperative. Normal affect.     Prior Labs/Diagnostic Studies   All labs and imaging personally reviewed     EKG/ stress test - if available please see in ROS above     The patient's records and results personally reviewed by this provider.     Outside records reviewed from: Care Everywhere    LAB/DIAGNOSTIC STUDIES TODAY:    Component      Latest Ref Rng 1/15/2025  1:19 PM   Sodium      135 - 145 mmol/L 143    Potassium      3.4 - 5.3 mmol/L 3.9    Chloride      98 - 107 mmol/L 107    Carbon Dioxide (CO2)      22 - 29 mmol/L 25    Anion Gap      7 - 15 mmol/L 11    Urea Nitrogen      6.0 - 20.0 mg/dL 13.9    Creatinine      0.67 - 1.17 mg/dL 0.93    GFR Estimate      >60 mL/min/1.73m2 >90    Calcium      8.8 - 10.4 mg/dL 9.5    Glucose      70 - 99 mg/dL 102 (H)    WBC      4.0 - 11.0 10e3/uL 9.7    RBC Count      4.40 - 5.90 10e6/uL 5.52    Hemoglobin      13.3 - 17.7 g/dL 15.6    Hematocrit      40.0 - 53.0 % 47.0    MCV      78 - 100 fL 85    MCH      26.5 - 33.0 pg 28.3    MCHC      31.5 - 36.5 g/dL 33.2    RDW      10.0 - 15.0 % 15.4 (H)    Platelet Count      150 - 450 10e3/uL 216    Estimated Average Glucose      <117 mg/dL 117 (H)    Hemoglobin A1C      <5.7 % 5.7 (H)       Legend:  (H)  "High    Assessment    Prashant Allen is a 35 year old male seen as a PAC referral for risk assessment and optimization for anesthesia.    Plan/Recommendations  Pt will be optimized for the proposed procedure.  See below for details on the assessment, risk, and preoperative recommendations    NEUROLOGY  - No history of TIA, CVA or seizure    -Post Op delirium risk factors:  No risk identified    ENT  - No current airway concerns.  Will need to be reassessed day of surgery.  Mallampati: I  TM: > 3    CARDIAC  - No history of CAD, Hypertension, and Afib  - METS (Metabolic Equivalents)  Patient performs 4 or more METS exercise without symptoms             Total Score: 0      RCRI-Low risk: Class 2 0.9% complication rate             Total Score: 1    RCRI: High Risk Surgery        PULMONARY    ALDEN Low Risk             Total Score: 2    ALDEN: Over 50 ys old    ALDEN: Male      - Denies asthma or inhaler use  - Tobacco History    History   Smoking Status    Never   Smokeless Tobacco    Never       GI  - denies GERD  - seiwert type III esophagogastric junction adenocarcinoma.  Surgery planned as above.   PONV Medium Risk  Total Score: 2           1 AN PONV: Patient is not a current smoker    1 AN PONV: Intended Post Op Opioids            ENDOCRINE    - BMI: Estimated body mass index is 28.96 kg/m  as calculated from the following:    Height as of this encounter: 1.88 m (6' 2\").    Weight as of this encounter: 102.3 kg (225 lb 8.5 oz).  Overweight (BMI 25.0-29.9)  - No history of Diabetes Mellitus    HEME  VTE Medium Risk 1.8%             Total Score: 7    VTE: Male    VTE: Current cancer      - No history of abnormal bleeding or antiplatelet use.      MSK  Patient is NOT Frail             Total Score: 1    Frailty: Weight loss 10 lbs or greater        Access  - left portacath      Different anesthesia methods/types have been discussed with the patient, but they are aware that the final plan will be decided by the assigned " anesthesia provider on the date of service.      The patient is optimized for their procedure. AVS with information on surgery time/arrival time, meds and NPO status given by nursing staff. No further diagnostic testing indicated.      On the day of service:     Prep time: 7 minutes  Visit time: 13 minutes  Documentation time: 6 minutes  ------------------------------------------  Total time: 26 minutes      YVROSE Franks CNP  Preoperative Assessment Center  [facility]  Clinic and Surgery Center  Phone: [phone]  Fax: [phone]

## 2025-01-15 NOTE — NURSING NOTE
"Oncology Rooming Note    January 15, 2025 11:15 AM   Prashant Allen is a 35 year old male who presents for:    Chief Complaint   Patient presents with    Oncology Clinic Visit     Malignant Neoplasm of Stomach     Initial Vitals: /82 (BP Location: Left arm, Patient Position: Sitting, Cuff Size: Adult Large)   Pulse 83   Resp 16   Wt 102.3 kg (225 lb 9.6 oz)   SpO2 97%   BMI 28.97 kg/m   Estimated body mass index is 28.97 kg/m  as calculated from the following:    Height as of 9/30/24: 1.88 m (6' 2\").    Weight as of this encounter: 102.3 kg (225 lb 9.6 oz). Body surface area is 2.31 meters squared.  No Pain (0) Comment: Data Unavailable   No LMP for male patient.  Allergies reviewed: Yes  Medications reviewed: Yes    Medications: Medication refills not needed today.  Pharmacy name entered into [employer]: [facility] - [address]    Frailty Screening:   Is the patient here for a new oncology consult visit in cancer care? 2. No      Clinical concerns: None       Lizz Crum LPN  1/15/2025              "

## 2025-01-16 NOTE — RESULT ENCOUNTER NOTE
Teja Cerda,    Your test results are attached.  Your labs are all okay for surgery.  Please note that your hemoglobin A1c level is in the prediabetes range.  I recommend following up with your primary care provider within 3-6 months after surgery to discuss management and monitoring.         Usha Guillen DNP, RN, ANP-C

## 2025-01-20 ENCOUNTER — PRE VISIT (OUTPATIENT)
Dept: SURGERY | Facility: CLINIC | Age: 35
End: 2025-01-20

## 2025-01-20 DIAGNOSIS — Z01.818 PREOP EXAMINATION: ICD-10-CM

## 2025-01-20 DIAGNOSIS — C16.0 SIEWERT TYPE III ADENOCARCINOMA OF ESOPHAGOGASTRIC JUNCTION (H): Primary | ICD-10-CM

## 2025-01-23 ENCOUNTER — OFFICE VISIT (OUTPATIENT)
Dept: PULMONOLOGY | Facility: CLINIC | Age: 35
End: 2025-01-23
Payer: COMMERCIAL

## 2025-01-23 DIAGNOSIS — C16.0 SIEWERT TYPE III ADENOCARCINOMA OF ESOPHAGOGASTRIC JUNCTION (H): ICD-10-CM

## 2025-01-23 DIAGNOSIS — Z01.818 PREOP EXAMINATION: ICD-10-CM

## 2025-01-23 LAB
DLCOCOR-%PRED-PRE: 117 %
DLCOCOR-PRE: 42.36 ML/MIN/MMHG
DLCOUNC-%PRED-PRE: 120 %
DLCOUNC-PRE: 43.51 ML/MIN/MMHG
DLCOUNC-PRED: 36.11 ML/MIN/MMHG
ERV-%PRED-PRE: 74 %
ERV-PRE: 1.5 L
ERV-PRED: 2.01 L
EXPTIME-PRE: 6.21 SEC
FEF2575-%PRED-PRE: 110 %
FEF2575-PRE: 5.05 L/SEC
FEF2575-PRED: 4.56 L/SEC
FEFMAX-%PRED-PRE: 98 %
FEFMAX-PRE: 10.98 L/SEC
FEFMAX-PRED: 11.18 L/SEC
FEV1-%PRED-PRE: 94 %
FEV1-PRE: 4.37 L
FEV1FEV6-PRE: 86 %
FEV1FEV6-PRED: 83 %
FEV1FVC-PRE: 86 %
FEV1FVC-PRED: 82 %
FEV1SVC-PRE: 83 %
FEV1SVC-PRED: 82 %
FIFMAX-PRE: 6.15 L/SEC
FRCPLETH-%PRED-PRE: 76 %
FRCPLETH-PRE: 2.96 L
FRCPLETH-PRED: 3.89 L
FVC-%PRED-PRE: 89 %
FVC-PRE: 5.06 L
FVC-PRED: 5.65 L
IC-%PRED-PRE: 93 %
IC-PRE: 3.76 L
IC-PRED: 4.03 L
RVPLETH-%PRED-PRE: 73 %
RVPLETH-PRE: 1.46 L
RVPLETH-PRED: 1.98 L
TLCPLETH-%PRED-PRE: 84 %
TLCPLETH-PRE: 6.73 L
TLCPLETH-PRED: 7.94 L
VA-%PRED-PRE: 85 %
VA-PRE: 6.56 L
VC-%PRED-PRE: 93 %
VC-PRE: 5.27 L
VC-PRED: 5.63 L

## 2025-01-23 NOTE — PROGRESS NOTES
Prashant Allen comes into clinic today at the request of Karen FRANCES , for PFT    Tolerated testing well. No adverse reactions. Left lab in no distress.        CRIS QUINTERO

## 2025-01-28 ENCOUNTER — PREP FOR PROCEDURE (OUTPATIENT)
Dept: SURGERY | Facility: CLINIC | Age: 35
End: 2025-01-28
Payer: COMMERCIAL

## 2025-02-02 ASSESSMENT — ENCOUNTER SYMPTOMS: ORTHOPNEA: 0

## 2025-02-02 NOTE — ANESTHESIA PREPROCEDURE EVALUATION
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  This was diagnosed after he presented with nausea, early satiety and anorexia at his local health care facility in [place].  He has since had chemotherapy (last was 12/13/24) and now the above listed procedure has been recommended for further treatment.     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)        Past Surgical History  Past Surgical History:   Procedure Laterality Date    ESOPHAGOSCOPY, GASTROSCOPY, DUODENOSCOPY (EGD), COMBINED N/A 09/30/2024    Procedure: Esophagoscopy, gastroscopy, duodenoscopy (EGD);  Surgeon: Willy Alvarado MD;  [facility]    LAPAROSCOPY DIAGNOSTIC (GENERAL) N/A 09/30/2024    Procedure: DIAGNOSTIC LAPAROSCOPY with biopsy and lavage;  Surgeon: Willy Alvarado MD;  Location[facility]    UPPER GI ENDOSCOPY         Prior to Admission Medications  Current Outpatient Medications   Medication Sig Dispense Refill    dexAMETHasone (DECADRON) 4 MG tablet Take 4 mg by mouth. (Patient not taking: Reported on 1/15/2025)      lidocaine-prilocaine (EMLA) 2.5-2.5 % external cream  Place quarter size amount of cream to port site 30-45 minutes prior to arrival for treatment      ondansetron (ZOFRAN) 8 MG tablet Take 8 mg by mouth. (Patient not taking: Reported on 1/15/2025)      prochlorperazine (COMPAZINE) 10 MG tablet Take 10 mg by mouth. (Patient not taking: Reported on 1/15/2025)         Allergies  No Known Allergies    Social History  Social History     Socioeconomic History    Marital status: Single     Spouse name: Not on file    Number of children: Not on file    Years of education: Not on file    Highest education level: Not on file   Occupational History    Occupation: medical    Tobacco Use    Smoking status: Never    Smokeless tobacco: Never   Substance and Sexual Activity    Alcohol use: Not Currently     Comment: wine liquor not often    Drug use: Never    Sexual activity: Not on file   Other Topics Concern    Not on file   Social History Narrative    Not on file     Social Drivers of Health     Financial Resource Strain: Not on file   Food Insecurity: Not on file   Transportation Needs: Not on file   Physical Activity: Not on file   Stress: Not on file   Social Connections: Not on file   Interpersonal Safety: Not At Risk (10/7/2024)    Received from [facility]    Humiliation, Afraid, Rape, and Kick questionnaire     Fear of Current or Ex-Partner: No     Emotionally Abused: No     Physically Abused: No     Sexually Abused: No   Housing Stability: Not on file       Family History  Family History   Problem Relation Age of Onset    Breast Cancer Mother     Unknown/Adopted Father     Anesthesia Reaction No family hx of     Thrombosis No family hx of        Review of Systems  The complete review of systems is negative other than noted in the HPI or here.   Anesthesia Evaluation   Pt has had prior anesthetic. Type: General.    No history of anesthetic complications       ROS/MED HX  ENT/Pulmonary:     (+)     ALDEN risk factors, snores loudly,                    "            (-) recent URI   Neurologic:  - neg neurologic ROS     Cardiovascular: Comment: Intermittent \"borderline\" hypertension.      (+)  - -   -  - -                                 No previous cardiac testing  (-) PHELPS and orthopnea/PND   METS/Exercise Tolerance: >4 METS Comment: Doesn't exercise purposefully, but is active walking, pushing, pulling, lifting, etc on the manufacturing floor.  Can ascend a flight of stairs.    Denies any exertional dyspnea or angina.    Hematologic:  - neg hematologic  ROS     Musculoskeletal:  - neg musculoskeletal ROS     GI/Hepatic: Comment: Adenocarcinoma of esophageal junction        Renal/Genitourinary:  - neg Renal ROS     Endo:  - neg endo ROS     Psychiatric/Substance Use:  - neg psychiatric ROS     Infectious Disease:  - neg infectious disease ROS     Malignancy:   (+) Malignancy, History of GI.GI CA  Active status post Chemo.      Other: Comment: Left chest portacath             EKG/ stress test - if available please see in ROS above     The patient's records and results personally reviewed by this provider.     Outside records reviewed from: Care Everywhere    LAB/DIAGNOSTIC STUDIES TODAY:    Component      Latest Ref Rng 1/15/2025  1:19 PM   Sodium      135 - 145 mmol/L 143    Potassium      3.4 - 5.3 mmol/L 3.9    Chloride      98 - 107 mmol/L 107    Carbon Dioxide (CO2)      22 - 29 mmol/L 25    Anion Gap      7 - 15 mmol/L 11    Urea Nitrogen      6.0 - 20.0 mg/dL 13.9    Creatinine      0.67 - 1.17 mg/dL 0.93    GFR Estimate      >60 mL/min/1.73m2 >90    Calcium      8.8 - 10.4 mg/dL 9.5    Glucose      70 - 99 mg/dL 102 (H)    WBC      4.0 - 11.0 10e3/uL 9.7    RBC Count      4.40 - 5.90 10e6/uL 5.52    Hemoglobin      13.3 - 17.7 g/dL 15.6    Hematocrit      40.0 - 53.0 % 47.0    MCV      78 - 100 fL 85    MCH      26.5 - 33.0 pg 28.3    MCHC      31.5 - 36.5 g/dL 33.2    RDW      10.0 - 15.0 % 15.4 (H)    Platelet Count      150 - 450 10e3/uL 216    Estimated " "Average Glucose      <117 mg/dL 117 (H)    Hemoglobin A1C      <5.7 % 5.7 (H)         NEUROLOGY  - No history of TIA, CVA or seizure    -Post Op delirium risk factors:  No risk identified    ENT  - No current airway concerns.  Will need to be reassessed day of surgery.  Mallampati: I  TM: > 3    CARDIAC  - No history of CAD, Hypertension, and Afib  - METS (Metabolic Equivalents)  Patient performs 4 or more METS exercise without symptoms             Total Score: 0      RCRI-Low risk: Class 2 0.9% complication rate             Total Score: 1    RCRI: High Risk Surgery        PULMONARY    ALDEN Low Risk             Total Score: 2    ALDEN: Over 50 ys old    ALDEN: Male      - Denies asthma or inhaler use  - Tobacco History    History   Smoking Status    Never   Smokeless Tobacco    Never       GI  - denies GERD  - seiwert type III esophagogastric junction adenocarcinoma.  Surgery planned as above.   PONV Medium Risk  Total Score: 2           1 AN PONV: Patient is not a current smoker    1 AN PONV: Intended Post Op Opioids            ENDOCRINE    - BMI: Estimated body mass index is 28.96 kg/m  as calculated from the following:    Height as of 1/15/25: 1.88 m (6' 2\").    Weight as of 1/15/25: 102.3 kg (225 lb 8.5 oz).  Overweight (BMI 25.0-29.9)  - No history of Diabetes Mellitus    HEME  VTE Medium Risk 1.8%             Total Score: 7    VTE: Male    VTE: Current cancer      - No history of abnormal bleeding or antiplatelet use.      MSK  Patient is NOT Frail             Total Score: 1    Frailty: Weight loss 10 lbs or greater        Access  - left portacath      Physical Exam    Airway        Mallampati: III   TM distance: > 3 FB   Neck ROM: full   Mouth opening: > 3 cm    Respiratory Devices and Support         Dental       (+) Minor Abnormalities - some fillings, tiny chips      Cardiovascular   cardiovascular exam normal          Pulmonary   pulmonary exam normal                Anesthesia Plan    ASA Status:  3    NPO " Status:  NPO Appropriate    Anesthesia Type: General.     - Airway: ETT   Induction: Intravenous, Propofol, RSI.   Maintenance: Balanced.   Techniques and Equipment:     - Airway: Video-Laryngoscope, Double lumen ETT (single lumen tube to start, DL if needed per surgery team)     - Lines/Monitors: 2nd IV, Arterial Line, BIS     - Blood: T&C, PRBC     - Drips/Meds: Phenylephrine     Consents    Anesthesia Plan(s) and associated risks, benefits, and realistic alternatives discussed. Questions answered and patient/representative(s) expressed understanding.     - Discussed: Risks, Benefits and Alternatives for BOTH SEDATION and the PROCEDURE were discussed     - Discussed with:  Patient      - Extended Intubation/Ventilatory Support Discussed: Yes.      - Patient is DNR/DNI Status: No     Use of blood products discussed: Yes.     - Discussed with: Patient.     - Consented: consented to blood products     Postoperative Care    Pain management: IV analgesics, Oral pain medications, Multi-modal analgesia, Neuraxial analgesia.   PONV prophylaxis: Ondansetron (or other 5HT-3), Dexamethasone or Solumedrol     Comments:    Other Comments: 34 yo male PMHx adenocarcinoma of esophageal junction for laparotomy and left thoracotomy with total gastrectomy. Plan pre-op neuraxial block, GETA with standard ASA monitors, 2nd PIV, arterial line, BIS.

## 2025-02-03 ENCOUNTER — HOSPITAL ENCOUNTER (INPATIENT)
Facility: CLINIC | Age: 35
End: 2025-02-03
Attending: THORACIC SURGERY (CARDIOTHORACIC VASCULAR SURGERY) | Admitting: THORACIC SURGERY (CARDIOTHORACIC VASCULAR SURGERY)
Payer: COMMERCIAL

## 2025-02-03 ENCOUNTER — PREP FOR PROCEDURE (OUTPATIENT)
Dept: SURGERY | Facility: CLINIC | Age: 35
End: 2025-02-03

## 2025-02-03 ENCOUNTER — APPOINTMENT (OUTPATIENT)
Dept: GENERAL RADIOLOGY | Facility: CLINIC | Age: 35
DRG: 327 | End: 2025-02-03
Attending: THORACIC SURGERY (CARDIOTHORACIC VASCULAR SURGERY)
Payer: COMMERCIAL

## 2025-02-03 ENCOUNTER — APPOINTMENT (OUTPATIENT)
Dept: GENERAL RADIOLOGY | Facility: CLINIC | Age: 35
End: 2025-02-03
Attending: STUDENT IN AN ORGANIZED HEALTH CARE EDUCATION/TRAINING PROGRAM
Payer: COMMERCIAL

## 2025-02-03 ENCOUNTER — ANESTHESIA (OUTPATIENT)
Dept: SURGERY | Facility: CLINIC | Age: 35
End: 2025-02-03
Payer: COMMERCIAL

## 2025-02-03 DIAGNOSIS — C16.0 MALIGNANT NEOPLASM OF CARDIA OF STOMACH (H): Primary | ICD-10-CM

## 2025-02-03 PROBLEM — C16.9 GASTRIC CANCER (H): Status: ACTIVE | Noted: 2025-02-03

## 2025-02-03 LAB
ABO + RH BLD: NORMAL
ANION GAP SERPL CALCULATED.3IONS-SCNC: 13 MMOL/L (ref 7–15)
BASE EXCESS BLDA CALC-SCNC: -2.2 MMOL/L (ref -3–3)
BASE EXCESS BLDA CALC-SCNC: -2.6 MMOL/L (ref -3–3)
BASE EXCESS BLDA CALC-SCNC: -2.6 MMOL/L (ref -3–3)
BASE EXCESS BLDA CALC-SCNC: -2.7 MMOL/L (ref -3–3)
BASE EXCESS BLDA CALC-SCNC: -2.8 MMOL/L (ref -3–3)
BLD GP AB SCN SERPL QL: NEGATIVE
BUN SERPL-MCNC: 10.2 MG/DL (ref 6–20)
CA-I BLD-MCNC: 4.2 MG/DL (ref 4.4–5.2)
CA-I BLD-MCNC: 4.3 MG/DL (ref 4.4–5.2)
CA-I BLD-MCNC: 4.6 MG/DL (ref 4.4–5.2)
CA-I BLD-MCNC: 4.7 MG/DL (ref 4.4–5.2)
CA-I BLD-MCNC: 4.8 MG/DL (ref 4.4–5.2)
CALCIUM SERPL-MCNC: 8.4 MG/DL (ref 8.8–10.4)
CHLORIDE SERPL-SCNC: 105 MMOL/L (ref 98–107)
CREAT SERPL-MCNC: 0.81 MG/DL (ref 0.67–1.17)
EGFRCR SERPLBLD CKD-EPI 2021: >90 ML/MIN/1.73M2
ERYTHROCYTE [DISTWIDTH] IN BLOOD BY AUTOMATED COUNT: 14.4 % (ref 10–15)
GLUCOSE BLD-MCNC: 112 MG/DL (ref 70–99)
GLUCOSE BLD-MCNC: 155 MG/DL (ref 70–99)
GLUCOSE BLD-MCNC: 158 MG/DL (ref 70–99)
GLUCOSE BLD-MCNC: 166 MG/DL (ref 70–99)
GLUCOSE BLD-MCNC: 166 MG/DL (ref 70–99)
GLUCOSE BLDC GLUCOMTR-MCNC: 142 MG/DL (ref 70–99)
GLUCOSE BLDC GLUCOMTR-MCNC: 146 MG/DL (ref 70–99)
GLUCOSE BLDC GLUCOMTR-MCNC: 159 MG/DL (ref 70–99)
GLUCOSE BLDC GLUCOMTR-MCNC: 173 MG/DL (ref 70–99)
GLUCOSE BLDC GLUCOMTR-MCNC: 84 MG/DL (ref 70–99)
GLUCOSE SERPL-MCNC: 154 MG/DL (ref 70–99)
HCO3 BLDA-SCNC: 22 MMOL/L (ref 21–28)
HCO3 BLDA-SCNC: 23 MMOL/L (ref 21–28)
HCO3 BLDA-SCNC: 23 MMOL/L (ref 21–28)
HCO3 BLDA-SCNC: 24 MMOL/L (ref 21–28)
HCO3 BLDA-SCNC: 24 MMOL/L (ref 21–28)
HCO3 SERPL-SCNC: 21 MMOL/L (ref 22–29)
HCT VFR BLD AUTO: 41.6 % (ref 40–53)
HGB BLD-MCNC: 12.9 G/DL (ref 13.3–17.7)
HGB BLD-MCNC: 13.5 G/DL (ref 13.3–17.7)
HGB BLD-MCNC: 13.8 G/DL (ref 13.3–17.7)
HGB BLD-MCNC: 13.9 G/DL (ref 13.3–17.7)
HGB BLD-MCNC: 15 G/DL (ref 13.3–17.7)
HGB BLD-MCNC: 15.1 G/DL (ref 13.3–17.7)
LACTATE BLD-SCNC: 0.7 MMOL/L (ref 0.7–2)
LACTATE BLD-SCNC: 0.7 MMOL/L (ref 0.7–2)
LACTATE BLD-SCNC: 1.3 MMOL/L (ref 0.7–2)
LACTATE BLD-SCNC: 1.4 MMOL/L (ref 0.7–2)
LACTATE BLD-SCNC: 1.4 MMOL/L (ref 0.7–2)
MCH RBC QN AUTO: 28.6 PG (ref 26.5–33)
MCHC RBC AUTO-ENTMCNC: 33.4 G/DL (ref 31.5–36.5)
MCV RBC AUTO: 86 FL (ref 78–100)
O2/TOTAL GAS SETTING VFR VENT: 25 %
O2/TOTAL GAS SETTING VFR VENT: 38 %
O2/TOTAL GAS SETTING VFR VENT: 41 %
O2/TOTAL GAS SETTING VFR VENT: 45 %
O2/TOTAL GAS SETTING VFR VENT: 60 %
OXYHGB MFR BLDA: 92 % (ref 92–100)
OXYHGB MFR BLDA: 98 % (ref 92–100)
OXYHGB MFR BLDA: 98 % (ref 92–100)
OXYHGB MFR BLDA: 99 % (ref 92–100)
OXYHGB MFR BLDA: 99 % (ref 92–100)
PATH REPORT.COMMENTS IMP SPEC: NORMAL
PATH REPORT.INTRAOP OBS SPEC DOC: NORMAL
PCO2 BLDA: 36 MM HG (ref 35–45)
PCO2 BLDA: 41 MM HG (ref 35–45)
PCO2 BLDA: 41 MM HG (ref 35–45)
PCO2 BLDA: 44 MM HG (ref 35–45)
PCO2 BLDA: 48 MM HG (ref 35–45)
PH BLDA: 7.31 [PH] (ref 7.35–7.45)
PH BLDA: 7.34 [PH] (ref 7.35–7.45)
PH BLDA: 7.35 [PH] (ref 7.35–7.45)
PH BLDA: 7.35 [PH] (ref 7.35–7.45)
PH BLDA: 7.39 [PH] (ref 7.35–7.45)
PLATELET # BLD AUTO: 192 10E3/UL (ref 150–450)
PLATELET # BLD AUTO: 200 10E3/UL (ref 150–450)
PO2 BLDA: 121 MM HG (ref 80–105)
PO2 BLDA: 139 MM HG (ref 80–105)
PO2 BLDA: 178 MM HG (ref 80–105)
PO2 BLDA: 248 MM HG (ref 80–105)
PO2 BLDA: 72 MM HG (ref 80–105)
POTASSIUM BLD-SCNC: 3.4 MMOL/L (ref 3.4–5.3)
POTASSIUM BLD-SCNC: 3.9 MMOL/L (ref 3.4–5.3)
POTASSIUM BLD-SCNC: 4.2 MMOL/L (ref 3.4–5.3)
POTASSIUM SERPL-SCNC: 4.6 MMOL/L (ref 3.4–5.3)
RADIOLOGIST FLAGS: ABNORMAL
RBC # BLD AUTO: 4.86 10E6/UL (ref 4.4–5.9)
SAO2 % BLDA: 100 % (ref 96–97)
SAO2 % BLDA: 94 % (ref 96–97)
SAO2 % BLDA: 99 % (ref 96–97)
SODIUM BLD-SCNC: 138 MMOL/L (ref 135–145)
SODIUM BLD-SCNC: 139 MMOL/L (ref 135–145)
SODIUM BLD-SCNC: 141 MMOL/L (ref 135–145)
SODIUM SERPL-SCNC: 139 MMOL/L (ref 135–145)
SPECIMEN EXP DATE BLD: NORMAL
WBC # BLD AUTO: 14 10E3/UL (ref 4–11)

## 2025-02-03 PROCEDURE — 72020 X-RAY EXAM OF SPINE 1 VIEW: CPT | Mod: 26 | Performed by: STUDENT IN AN ORGANIZED HEALTH CARE EDUCATION/TRAINING PROGRAM

## 2025-02-03 PROCEDURE — 999N000065 XR CHEST PORT 1 VIEW

## 2025-02-03 PROCEDURE — 71045 X-RAY EXAM CHEST 1 VIEW: CPT

## 2025-02-03 PROCEDURE — 258N000003 HC RX IP 258 OP 636: Performed by: REGISTERED NURSE

## 2025-02-03 PROCEDURE — 0D120ZA BYPASS MIDDLE ESOPHAGUS TO JEJUNUM, OPEN APPROACH: ICD-10-PCS | Performed by: THORACIC SURGERY (CARDIOTHORACIC VASCULAR SURGERY)

## 2025-02-03 PROCEDURE — 360N000078 HC SURGERY LEVEL 5, PER MIN: Performed by: THORACIC SURGERY (CARDIOTHORACIC VASCULAR SURGERY)

## 2025-02-03 PROCEDURE — 0DHA0UZ INSERTION OF FEEDING DEVICE INTO JEJUNUM, OPEN APPROACH: ICD-10-PCS | Performed by: THORACIC SURGERY (CARDIOTHORACIC VASCULAR SURGERY)

## 2025-02-03 PROCEDURE — 74018 RADEX ABDOMEN 1 VIEW: CPT | Mod: 26 | Performed by: STUDENT IN AN ORGANIZED HEALTH CARE EDUCATION/TRAINING PROGRAM

## 2025-02-03 PROCEDURE — 250N000011 HC RX IP 250 OP 636: Performed by: STUDENT IN AN ORGANIZED HEALTH CARE EDUCATION/TRAINING PROGRAM

## 2025-02-03 PROCEDURE — 88331 PATH CONSLTJ SURG 1 BLK 1SPC: CPT | Mod: TC | Performed by: THORACIC SURGERY (CARDIOTHORACIC VASCULAR SURGERY)

## 2025-02-03 PROCEDURE — 88331 PATH CONSLTJ SURG 1 BLK 1SPC: CPT | Mod: 26 | Performed by: PATHOLOGY

## 2025-02-03 PROCEDURE — 999N000141 HC STATISTIC PRE-PROCEDURE NURSING ASSESSMENT: Performed by: THORACIC SURGERY (CARDIOTHORACIC VASCULAR SURGERY)

## 2025-02-03 PROCEDURE — 82330 ASSAY OF CALCIUM: CPT

## 2025-02-03 PROCEDURE — 0DJ08ZZ INSPECTION OF UPPER INTESTINAL TRACT, VIA NATURAL OR ARTIFICIAL OPENING ENDOSCOPIC: ICD-10-PCS | Performed by: THORACIC SURGERY (CARDIOTHORACIC VASCULAR SURGERY)

## 2025-02-03 PROCEDURE — 258N000003 HC RX IP 258 OP 636

## 2025-02-03 PROCEDURE — 88305 TISSUE EXAM BY PATHOLOGIST: CPT | Mod: 26 | Performed by: PATHOLOGY

## 2025-02-03 PROCEDURE — 43117 PARTIAL REMOVAL OF ESOPHAGUS: CPT | Mod: GC | Performed by: THORACIC SURGERY (CARDIOTHORACIC VASCULAR SURGERY)

## 2025-02-03 PROCEDURE — 250N000009 HC RX 250: Performed by: STUDENT IN AN ORGANIZED HEALTH CARE EDUCATION/TRAINING PROGRAM

## 2025-02-03 PROCEDURE — 250N000011 HC RX IP 250 OP 636: Performed by: THORACIC SURGERY (CARDIOTHORACIC VASCULAR SURGERY)

## 2025-02-03 PROCEDURE — 200N000002 HC R&B ICU UMMC

## 2025-02-03 PROCEDURE — 258N000003 HC RX IP 258 OP 636: Performed by: STUDENT IN AN ORGANIZED HEALTH CARE EDUCATION/TRAINING PROGRAM

## 2025-02-03 PROCEDURE — 36415 COLL VENOUS BLD VENIPUNCTURE: CPT | Performed by: THORACIC SURGERY (CARDIOTHORACIC VASCULAR SURGERY)

## 2025-02-03 PROCEDURE — 710N000010 HC RECOVERY PHASE 1, LEVEL 2, PER MIN: Performed by: THORACIC SURGERY (CARDIOTHORACIC VASCULAR SURGERY)

## 2025-02-03 PROCEDURE — 07BC0ZX EXCISION OF PELVIS LYMPHATIC, OPEN APPROACH, DIAGNOSTIC: ICD-10-PCS | Performed by: THORACIC SURGERY (CARDIOTHORACIC VASCULAR SURGERY)

## 2025-02-03 PROCEDURE — 272N000001 HC OR GENERAL SUPPLY STERILE: Performed by: THORACIC SURGERY (CARDIOTHORACIC VASCULAR SURGERY)

## 2025-02-03 PROCEDURE — 250N000011 HC RX IP 250 OP 636: Performed by: REGISTERED NURSE

## 2025-02-03 PROCEDURE — 71045 X-RAY EXAM CHEST 1 VIEW: CPT | Mod: 26 | Performed by: STUDENT IN AN ORGANIZED HEALTH CARE EDUCATION/TRAINING PROGRAM

## 2025-02-03 PROCEDURE — 44015 INSERT NEEDLE CATH BOWEL: CPT | Mod: GC | Performed by: THORACIC SURGERY (CARDIOTHORACIC VASCULAR SURGERY)

## 2025-02-03 PROCEDURE — 0DT60ZZ RESECTION OF STOMACH, OPEN APPROACH: ICD-10-PCS | Performed by: THORACIC SURGERY (CARDIOTHORACIC VASCULAR SURGERY)

## 2025-02-03 PROCEDURE — 86900 BLOOD TYPING SEROLOGIC ABO: CPT | Performed by: REGISTERED NURSE

## 2025-02-03 PROCEDURE — 85049 AUTOMATED PLATELET COUNT: CPT | Performed by: STUDENT IN AN ORGANIZED HEALTH CARE EDUCATION/TRAINING PROGRAM

## 2025-02-03 PROCEDURE — 85014 HEMATOCRIT: CPT | Performed by: STUDENT IN AN ORGANIZED HEALTH CARE EDUCATION/TRAINING PROGRAM

## 2025-02-03 PROCEDURE — 43621 REMOVAL OF STOMACH: CPT | Mod: GC | Performed by: THORACIC SURGERY (CARDIOTHORACIC VASCULAR SURGERY)

## 2025-02-03 PROCEDURE — 250N000013 HC RX MED GY IP 250 OP 250 PS 637: Performed by: STUDENT IN AN ORGANIZED HEALTH CARE EDUCATION/TRAINING PROGRAM

## 2025-02-03 PROCEDURE — 0WJJ4ZZ INSPECTION OF PELVIC CAVITY, PERCUTANEOUS ENDOSCOPIC APPROACH: ICD-10-PCS | Performed by: THORACIC SURGERY (CARDIOTHORACIC VASCULAR SURGERY)

## 2025-02-03 PROCEDURE — 88332 PATH CONSLTJ SURG EA ADD BLK: CPT | Mod: 26 | Performed by: PATHOLOGY

## 2025-02-03 PROCEDURE — 999N000065 XR THORACIC SPINE 1 VIEW

## 2025-02-03 PROCEDURE — 0DT30ZZ RESECTION OF LOWER ESOPHAGUS, OPEN APPROACH: ICD-10-PCS | Performed by: THORACIC SURGERY (CARDIOTHORACIC VASCULAR SURGERY)

## 2025-02-03 PROCEDURE — 250N000011 HC RX IP 250 OP 636

## 2025-02-03 PROCEDURE — 99291 CRITICAL CARE FIRST HOUR: CPT | Mod: GC | Performed by: INTERNAL MEDICINE

## 2025-02-03 PROCEDURE — 250N000009 HC RX 250: Performed by: REGISTERED NURSE

## 2025-02-03 PROCEDURE — 82310 ASSAY OF CALCIUM: CPT | Performed by: STUDENT IN AN ORGANIZED HEALTH CARE EDUCATION/TRAINING PROGRAM

## 2025-02-03 PROCEDURE — 370N000017 HC ANESTHESIA TECHNICAL FEE, PER MIN: Performed by: THORACIC SURGERY (CARDIOTHORACIC VASCULAR SURGERY)

## 2025-02-03 PROCEDURE — 0DBU0ZZ EXCISION OF OMENTUM, OPEN APPROACH: ICD-10-PCS | Performed by: THORACIC SURGERY (CARDIOTHORACIC VASCULAR SURGERY)

## 2025-02-03 PROCEDURE — 250N000025 HC SEVOFLURANE, PER MIN: Performed by: THORACIC SURGERY (CARDIOTHORACIC VASCULAR SURGERY)

## 2025-02-03 PROCEDURE — C2618 PROBE/NEEDLE, CRYO: HCPCS | Performed by: THORACIC SURGERY (CARDIOTHORACIC VASCULAR SURGERY)

## 2025-02-03 PROCEDURE — 38747 REMOVE ABDOMINAL LYMPH NODES: CPT | Mod: XS | Performed by: THORACIC SURGERY (CARDIOTHORACIC VASCULAR SURGERY)

## 2025-02-03 PROCEDURE — 0W9B00Z DRAINAGE OF LEFT PLEURAL CAVITY WITH DRAINAGE DEVICE, OPEN APPROACH: ICD-10-PCS | Performed by: THORACIC SURGERY (CARDIOTHORACIC VASCULAR SURGERY)

## 2025-02-03 PROCEDURE — 88342 IMHCHEM/IMCYTCHM 1ST ANTB: CPT | Mod: 26 | Performed by: PATHOLOGY

## 2025-02-03 PROCEDURE — 999N000065 XR ABDOMEN PORT 1 VIEW

## 2025-02-03 PROCEDURE — 88309 TISSUE EXAM BY PATHOLOGIST: CPT | Mod: 26 | Performed by: PATHOLOGY

## 2025-02-03 PROCEDURE — 88307 TISSUE EXAM BY PATHOLOGIST: CPT | Mod: 26 | Performed by: PATHOLOGY

## 2025-02-03 PROCEDURE — 272N000002 HC OR SUPPLY OTHER OPNP: Performed by: THORACIC SURGERY (CARDIOTHORACIC VASCULAR SURGERY)

## 2025-02-03 RX ORDER — PROPOFOL 10 MG/ML
INJECTION, EMULSION INTRAVENOUS PRN
Status: DISCONTINUED | OUTPATIENT
Start: 2025-02-03 | End: 2025-02-03

## 2025-02-03 RX ORDER — NALOXONE HYDROCHLORIDE 0.4 MG/ML
0.4 INJECTION, SOLUTION INTRAMUSCULAR; INTRAVENOUS; SUBCUTANEOUS
Status: DISCONTINUED | OUTPATIENT
Start: 2025-02-03 | End: 2025-02-11 | Stop reason: HOSPADM

## 2025-02-03 RX ORDER — NALOXONE HYDROCHLORIDE 0.4 MG/ML
0.2 INJECTION, SOLUTION INTRAMUSCULAR; INTRAVENOUS; SUBCUTANEOUS
Status: DISCONTINUED | OUTPATIENT
Start: 2025-02-03 | End: 2025-02-03 | Stop reason: HOSPADM

## 2025-02-03 RX ORDER — ACETAMINOPHEN 325 MG/1
975 TABLET ORAL ONCE
Status: COMPLETED | OUTPATIENT
Start: 2025-02-03 | End: 2025-02-03

## 2025-02-03 RX ORDER — NALOXONE HYDROCHLORIDE 0.4 MG/ML
0.1 INJECTION, SOLUTION INTRAMUSCULAR; INTRAVENOUS; SUBCUTANEOUS
Status: DISCONTINUED | OUTPATIENT
Start: 2025-02-03 | End: 2025-02-03 | Stop reason: HOSPADM

## 2025-02-03 RX ORDER — GLYCOPYRROLATE 0.2 MG/ML
INJECTION, SOLUTION INTRAMUSCULAR; INTRAVENOUS PRN
Status: DISCONTINUED | OUTPATIENT
Start: 2025-02-03 | End: 2025-02-03

## 2025-02-03 RX ORDER — OXYMETAZOLINE HYDROCHLORIDE 0.05 G/100ML
SPRAY NASAL PRN
Status: DISCONTINUED | OUTPATIENT
Start: 2025-02-03 | End: 2025-02-03

## 2025-02-03 RX ORDER — HEPARIN SODIUM 5000 [USP'U]/.5ML
5000 INJECTION, SOLUTION INTRAVENOUS; SUBCUTANEOUS EVERY 8 HOURS
Status: DISCONTINUED | OUTPATIENT
Start: 2025-02-04 | End: 2025-02-03

## 2025-02-03 RX ORDER — SODIUM CHLORIDE, SODIUM GLUCONATE, SODIUM ACETATE, POTASSIUM CHLORIDE AND MAGNESIUM CHLORIDE 526; 502; 368; 37; 30 MG/100ML; MG/100ML; MG/100ML; MG/100ML; MG/100ML
INJECTION, SOLUTION INTRAVENOUS CONTINUOUS PRN
Status: DISCONTINUED | OUTPATIENT
Start: 2025-02-03 | End: 2025-02-03

## 2025-02-03 RX ORDER — NALBUPHINE HYDROCHLORIDE 10 MG/ML
2.5-5 INJECTION INTRAMUSCULAR; INTRAVENOUS; SUBCUTANEOUS EVERY 6 HOURS PRN
Status: DISCONTINUED | OUTPATIENT
Start: 2025-02-03 | End: 2025-02-11 | Stop reason: HOSPADM

## 2025-02-03 RX ORDER — HYDROMORPHONE HCL IN WATER/PF 6 MG/30 ML
0.4 PATIENT CONTROLLED ANALGESIA SYRINGE INTRAVENOUS EVERY 5 MIN PRN
Status: DISCONTINUED | OUTPATIENT
Start: 2025-02-03 | End: 2025-02-03 | Stop reason: HOSPADM

## 2025-02-03 RX ORDER — CEFAZOLIN SODIUM/WATER 2 G/20 ML
2 SYRINGE (ML) INTRAVENOUS
Status: DISCONTINUED | OUTPATIENT
Start: 2025-02-03 | End: 2025-02-03 | Stop reason: HOSPADM

## 2025-02-03 RX ORDER — CEFAZOLIN SODIUM/WATER 2 G/20 ML
2 SYRINGE (ML) INTRAVENOUS SEE ADMIN INSTRUCTIONS
Status: DISCONTINUED | OUTPATIENT
Start: 2025-02-03 | End: 2025-02-03 | Stop reason: HOSPADM

## 2025-02-03 RX ORDER — AMIODARONE HCL/D5W 900MG/0.5L
1 PLASTIC BAG, INJECTION (ML) INTRAVENOUS CONTINUOUS
Status: ACTIVE | OUTPATIENT
Start: 2025-02-03 | End: 2025-02-04

## 2025-02-03 RX ORDER — AMIODARONE HCL/D5W 900MG/0.5L
0.5 PLASTIC BAG, INJECTION (ML) INTRAVENOUS CONTINUOUS
Status: ACTIVE | OUTPATIENT
Start: 2025-02-04 | End: 2025-02-04

## 2025-02-03 RX ORDER — FENTANYL CITRATE 50 UG/ML
25-50 INJECTION, SOLUTION INTRAMUSCULAR; INTRAVENOUS
Status: DISCONTINUED | OUTPATIENT
Start: 2025-02-03 | End: 2025-02-03 | Stop reason: HOSPADM

## 2025-02-03 RX ORDER — ACETAMINOPHEN 325 MG/1
975 TABLET ORAL EVERY 8 HOURS
Status: DISCONTINUED | OUTPATIENT
Start: 2025-02-03 | End: 2025-02-04

## 2025-02-03 RX ORDER — SODIUM CHLORIDE, SODIUM LACTATE, POTASSIUM CHLORIDE, CALCIUM CHLORIDE 600; 310; 30; 20 MG/100ML; MG/100ML; MG/100ML; MG/100ML
INJECTION, SOLUTION INTRAVENOUS CONTINUOUS
Status: DISCONTINUED | OUTPATIENT
Start: 2025-02-03 | End: 2025-02-05

## 2025-02-03 RX ORDER — HEPARIN SODIUM 5000 [USP'U]/.5ML
5000 INJECTION, SOLUTION INTRAVENOUS; SUBCUTANEOUS EVERY 8 HOURS
Status: DISCONTINUED | OUTPATIENT
Start: 2025-02-04 | End: 2025-02-04

## 2025-02-03 RX ORDER — NALOXONE HYDROCHLORIDE 0.4 MG/ML
0.2 INJECTION, SOLUTION INTRAMUSCULAR; INTRAVENOUS; SUBCUTANEOUS
Status: DISCONTINUED | OUTPATIENT
Start: 2025-02-03 | End: 2025-02-11 | Stop reason: HOSPADM

## 2025-02-03 RX ORDER — CALCIUM CHLORIDE 100 MG/ML
INJECTION INTRAVENOUS; INTRAVENTRICULAR PRN
Status: DISCONTINUED | OUTPATIENT
Start: 2025-02-03 | End: 2025-02-03

## 2025-02-03 RX ORDER — NALOXONE HYDROCHLORIDE 0.4 MG/ML
0.4 INJECTION, SOLUTION INTRAMUSCULAR; INTRAVENOUS; SUBCUTANEOUS
Status: DISCONTINUED | OUTPATIENT
Start: 2025-02-03 | End: 2025-02-03 | Stop reason: HOSPADM

## 2025-02-03 RX ORDER — FENTANYL CITRATE 50 UG/ML
INJECTION, SOLUTION INTRAMUSCULAR; INTRAVENOUS PRN
Status: DISCONTINUED | OUTPATIENT
Start: 2025-02-03 | End: 2025-02-03

## 2025-02-03 RX ORDER — FENTANYL CITRATE 50 UG/ML
50 INJECTION, SOLUTION INTRAMUSCULAR; INTRAVENOUS EVERY 5 MIN PRN
Status: DISCONTINUED | OUTPATIENT
Start: 2025-02-03 | End: 2025-02-03 | Stop reason: HOSPADM

## 2025-02-03 RX ORDER — PROPOFOL 10 MG/ML
INJECTION, EMULSION INTRAVENOUS CONTINUOUS PRN
Status: DISCONTINUED | OUTPATIENT
Start: 2025-02-03 | End: 2025-02-03

## 2025-02-03 RX ORDER — FENTANYL CITRATE 50 UG/ML
25 INJECTION, SOLUTION INTRAMUSCULAR; INTRAVENOUS EVERY 5 MIN PRN
Status: DISCONTINUED | OUTPATIENT
Start: 2025-02-03 | End: 2025-02-03 | Stop reason: HOSPADM

## 2025-02-03 RX ORDER — EPHEDRINE SULFATE 50 MG/ML
INJECTION, SOLUTION INTRAMUSCULAR; INTRAVENOUS; SUBCUTANEOUS PRN
Status: DISCONTINUED | OUTPATIENT
Start: 2025-02-03 | End: 2025-02-03

## 2025-02-03 RX ORDER — PROCHLORPERAZINE MALEATE 10 MG
10 TABLET ORAL EVERY 6 HOURS PRN
Status: DISCONTINUED | OUTPATIENT
Start: 2025-02-03 | End: 2025-02-11 | Stop reason: HOSPADM

## 2025-02-03 RX ORDER — DEXAMETHASONE SODIUM PHOSPHATE 4 MG/ML
INJECTION, SOLUTION INTRA-ARTICULAR; INTRALESIONAL; INTRAMUSCULAR; INTRAVENOUS; SOFT TISSUE PRN
Status: DISCONTINUED | OUTPATIENT
Start: 2025-02-03 | End: 2025-02-03

## 2025-02-03 RX ORDER — ONDANSETRON 2 MG/ML
4 INJECTION INTRAMUSCULAR; INTRAVENOUS EVERY 6 HOURS PRN
Status: DISCONTINUED | OUTPATIENT
Start: 2025-02-03 | End: 2025-02-11 | Stop reason: HOSPADM

## 2025-02-03 RX ORDER — ONDANSETRON 2 MG/ML
4 INJECTION INTRAMUSCULAR; INTRAVENOUS EVERY 30 MIN PRN
Status: DISCONTINUED | OUTPATIENT
Start: 2025-02-03 | End: 2025-02-03 | Stop reason: HOSPADM

## 2025-02-03 RX ORDER — SODIUM CHLORIDE, SODIUM LACTATE, POTASSIUM CHLORIDE, CALCIUM CHLORIDE 600; 310; 30; 20 MG/100ML; MG/100ML; MG/100ML; MG/100ML
INJECTION, SOLUTION INTRAVENOUS CONTINUOUS PRN
Status: DISCONTINUED | OUTPATIENT
Start: 2025-02-03 | End: 2025-02-03

## 2025-02-03 RX ORDER — METHOCARBAMOL 100 MG/ML
500 INJECTION, SOLUTION INTRAMUSCULAR; INTRAVENOUS EVERY 8 HOURS
Status: DISCONTINUED | OUTPATIENT
Start: 2025-02-03 | End: 2025-02-04

## 2025-02-03 RX ORDER — ONDANSETRON 4 MG/1
4 TABLET, ORALLY DISINTEGRATING ORAL EVERY 6 HOURS PRN
Status: DISCONTINUED | OUTPATIENT
Start: 2025-02-03 | End: 2025-02-11 | Stop reason: HOSPADM

## 2025-02-03 RX ORDER — HYDROMORPHONE HCL IN WATER/PF 6 MG/30 ML
0.2 PATIENT CONTROLLED ANALGESIA SYRINGE INTRAVENOUS EVERY 5 MIN PRN
Status: DISCONTINUED | OUTPATIENT
Start: 2025-02-03 | End: 2025-02-03 | Stop reason: HOSPADM

## 2025-02-03 RX ORDER — ACETAMINOPHEN 325 MG/1
975 TABLET ORAL ONCE
Status: DISCONTINUED | OUTPATIENT
Start: 2025-02-03 | End: 2025-02-03 | Stop reason: HOSPADM

## 2025-02-03 RX ORDER — ONDANSETRON 4 MG/1
4 TABLET, ORALLY DISINTEGRATING ORAL EVERY 30 MIN PRN
Status: DISCONTINUED | OUTPATIENT
Start: 2025-02-03 | End: 2025-02-03 | Stop reason: HOSPADM

## 2025-02-03 RX ORDER — FLUMAZENIL 0.1 MG/ML
0.2 INJECTION, SOLUTION INTRAVENOUS
Status: DISCONTINUED | OUTPATIENT
Start: 2025-02-03 | End: 2025-02-03 | Stop reason: HOSPADM

## 2025-02-03 RX ORDER — HEPARIN SODIUM 5000 [USP'U]/.5ML
5000 INJECTION, SOLUTION INTRAVENOUS; SUBCUTANEOUS
Status: COMPLETED | OUTPATIENT
Start: 2025-02-03 | End: 2025-02-03

## 2025-02-03 RX ORDER — ONDANSETRON 2 MG/ML
INJECTION INTRAMUSCULAR; INTRAVENOUS PRN
Status: DISCONTINUED | OUTPATIENT
Start: 2025-02-03 | End: 2025-02-03

## 2025-02-03 RX ORDER — SODIUM CHLORIDE, SODIUM LACTATE, POTASSIUM CHLORIDE, CALCIUM CHLORIDE 600; 310; 30; 20 MG/100ML; MG/100ML; MG/100ML; MG/100ML
INJECTION, SOLUTION INTRAVENOUS CONTINUOUS
Status: DISCONTINUED | OUTPATIENT
Start: 2025-02-03 | End: 2025-02-03 | Stop reason: HOSPADM

## 2025-02-03 RX ADMIN — Medication 100 MG: at 08:29

## 2025-02-03 RX ADMIN — PHENYLEPHRINE HYDROCHLORIDE 100 MCG: 10 INJECTION INTRAVENOUS at 09:33

## 2025-02-03 RX ADMIN — PHENYLEPHRINE HYDROCHLORIDE 100 MCG: 10 INJECTION INTRAVENOUS at 12:50

## 2025-02-03 RX ADMIN — PROPOFOL 125 MCG/KG/MIN: 10 INJECTION, EMULSION INTRAVENOUS at 12:53

## 2025-02-03 RX ADMIN — Medication 30 MG: at 14:35

## 2025-02-03 RX ADMIN — FENTANYL CITRATE 100 MCG: 50 INJECTION INTRAMUSCULAR; INTRAVENOUS at 09:45

## 2025-02-03 RX ADMIN — EPHEDRINE SULFATE 5 MG: 5 INJECTION INTRAVENOUS at 10:09

## 2025-02-03 RX ADMIN — CALCIUM CHLORIDE INJECTION 200 MG: 100 INJECTION, SOLUTION INTRAVENOUS at 16:05

## 2025-02-03 RX ADMIN — SODIUM CHLORIDE, SODIUM GLUCONATE, SODIUM ACETATE, POTASSIUM CHLORIDE AND MAGNESIUM CHLORIDE: 526; 502; 368; 37; 30 INJECTION, SOLUTION INTRAVENOUS at 11:40

## 2025-02-03 RX ADMIN — SODIUM CHLORIDE, SODIUM GLUCONATE, SODIUM ACETATE, POTASSIUM CHLORIDE AND MAGNESIUM CHLORIDE: 526; 502; 368; 37; 30 INJECTION, SOLUTION INTRAVENOUS at 16:05

## 2025-02-03 RX ADMIN — Medication 20 MG: at 12:55

## 2025-02-03 RX ADMIN — DEXAMETHASONE SODIUM PHOSPHATE 6 MG: 4 INJECTION, SOLUTION INTRA-ARTICULAR; INTRALESIONAL; INTRAMUSCULAR; INTRAVENOUS; SOFT TISSUE at 08:55

## 2025-02-03 RX ADMIN — MIDAZOLAM 1 MG: 1 INJECTION INTRAMUSCULAR; INTRAVENOUS at 08:19

## 2025-02-03 RX ADMIN — HYDROMORPHONE HYDROCHLORIDE 7 ML/HR: 1 INJECTION, SOLUTION INTRAMUSCULAR; INTRAVENOUS; SUBCUTANEOUS at 11:39

## 2025-02-03 RX ADMIN — CALCIUM CHLORIDE INJECTION 200 MG: 100 INJECTION, SOLUTION INTRAVENOUS at 16:02

## 2025-02-03 RX ADMIN — DEXMEDETOMIDINE HYDROCHLORIDE 4 MCG: 100 INJECTION, SOLUTION INTRAVENOUS at 16:42

## 2025-02-03 RX ADMIN — PHENYLEPHRINE HYDROCHLORIDE 150 MCG: 10 INJECTION INTRAVENOUS at 11:54

## 2025-02-03 RX ADMIN — PHENYLEPHRINE HYDROCHLORIDE 150 MCG: 10 INJECTION INTRAVENOUS at 08:43

## 2025-02-03 RX ADMIN — Medication 20 MG: at 09:51

## 2025-02-03 RX ADMIN — HYDROMORPHONE HYDROCHLORIDE 0.25 MG: 1 INJECTION, SOLUTION INTRAMUSCULAR; INTRAVENOUS; SUBCUTANEOUS at 10:37

## 2025-02-03 RX ADMIN — ONDANSETRON 4 MG: 2 INJECTION INTRAMUSCULAR; INTRAVENOUS at 16:45

## 2025-02-03 RX ADMIN — CALCIUM CHLORIDE INJECTION 200 MG: 100 INJECTION, SOLUTION INTRAVENOUS at 14:33

## 2025-02-03 RX ADMIN — PHENYLEPHRINE HYDROCHLORIDE 100 MCG: 10 INJECTION INTRAVENOUS at 08:38

## 2025-02-03 RX ADMIN — PHENYLEPHRINE HYDROCHLORIDE 0.2 MCG/KG/MIN: 10 INJECTION INTRAVENOUS at 08:50

## 2025-02-03 RX ADMIN — MIDAZOLAM 1 MG: 1 INJECTION INTRAMUSCULAR; INTRAVENOUS at 08:05

## 2025-02-03 RX ADMIN — PHENYLEPHRINE HYDROCHLORIDE 100 MCG: 10 INJECTION INTRAVENOUS at 08:56

## 2025-02-03 RX ADMIN — PHENYLEPHRINE HYDROCHLORIDE 100 MCG: 10 INJECTION INTRAVENOUS at 12:23

## 2025-02-03 RX ADMIN — Medication 30 MG: at 11:47

## 2025-02-03 RX ADMIN — SODIUM CHLORIDE, SODIUM GLUCONATE, SODIUM ACETATE, POTASSIUM CHLORIDE AND MAGNESIUM CHLORIDE: 526; 502; 368; 37; 30 INJECTION, SOLUTION INTRAVENOUS at 14:50

## 2025-02-03 RX ADMIN — SODIUM CHLORIDE, POTASSIUM CHLORIDE, SODIUM LACTATE AND CALCIUM CHLORIDE: 600; 310; 30; 20 INJECTION, SOLUTION INTRAVENOUS at 13:40

## 2025-02-03 RX ADMIN — PANTOPRAZOLE SODIUM 40 MG: 40 INJECTION, POWDER, FOR SOLUTION INTRAVENOUS at 21:27

## 2025-02-03 RX ADMIN — SODIUM CHLORIDE, POTASSIUM CHLORIDE, SODIUM LACTATE AND CALCIUM CHLORIDE: 600; 310; 30; 20 INJECTION, SOLUTION INTRAVENOUS at 20:43

## 2025-02-03 RX ADMIN — FENTANYL CITRATE 50 MCG: 50 INJECTION INTRAMUSCULAR; INTRAVENOUS at 10:19

## 2025-02-03 RX ADMIN — FENTANYL CITRATE 25 MCG: 50 INJECTION INTRAMUSCULAR; INTRAVENOUS at 17:12

## 2025-02-03 RX ADMIN — PHENYLEPHRINE HYDROCHLORIDE 100 MCG: 10 INJECTION INTRAVENOUS at 09:14

## 2025-02-03 RX ADMIN — PHENYLEPHRINE HYDROCHLORIDE 100 MCG: 10 INJECTION INTRAVENOUS at 14:19

## 2025-02-03 RX ADMIN — CALCIUM CHLORIDE INJECTION 200 MG: 100 INJECTION, SOLUTION INTRAVENOUS at 15:00

## 2025-02-03 RX ADMIN — EPHEDRINE SULFATE 5 MG: 5 INJECTION INTRAVENOUS at 11:54

## 2025-02-03 RX ADMIN — PHENYLEPHRINE HYDROCHLORIDE 100 MCG: 10 INJECTION INTRAVENOUS at 08:50

## 2025-02-03 RX ADMIN — INSULIN HUMAN 1 UNITS/HR: 1 INJECTION, SOLUTION INTRAVENOUS at 12:15

## 2025-02-03 RX ADMIN — ACETAMINOPHEN 975 MG: 325 TABLET, FILM COATED ORAL at 06:18

## 2025-02-03 RX ADMIN — PHENYLEPHRINE HYDROCHLORIDE 200 MCG: 10 INJECTION INTRAVENOUS at 10:08

## 2025-02-03 RX ADMIN — PROPOFOL 50 MG: 10 INJECTION, EMULSION INTRAVENOUS at 09:45

## 2025-02-03 RX ADMIN — PHENYLEPHRINE HYDROCHLORIDE 100 MCG: 10 INJECTION INTRAVENOUS at 09:24

## 2025-02-03 RX ADMIN — MIDAZOLAM 1 MG: 1 INJECTION INTRAMUSCULAR; INTRAVENOUS at 07:01

## 2025-02-03 RX ADMIN — PHENYLEPHRINE HYDROCHLORIDE 100 MCG: 10 INJECTION INTRAVENOUS at 15:37

## 2025-02-03 RX ADMIN — HYDROMORPHONE HYDROCHLORIDE 0.25 MG: 1 INJECTION, SOLUTION INTRAMUSCULAR; INTRAVENOUS; SUBCUTANEOUS at 15:17

## 2025-02-03 RX ADMIN — Medication 2 G: at 16:29

## 2025-02-03 RX ADMIN — Medication 40 MG: at 15:34

## 2025-02-03 RX ADMIN — PHENYLEPHRINE HYDROCHLORIDE 100 MCG: 10 INJECTION INTRAVENOUS at 12:21

## 2025-02-03 RX ADMIN — SODIUM CHLORIDE, POTASSIUM CHLORIDE, SODIUM LACTATE AND CALCIUM CHLORIDE: 600; 310; 30; 20 INJECTION, SOLUTION INTRAVENOUS at 08:07

## 2025-02-03 RX ADMIN — FENTANYL CITRATE 50 MCG: 50 INJECTION, SOLUTION INTRAMUSCULAR; INTRAVENOUS at 06:59

## 2025-02-03 RX ADMIN — Medication 1 MG/MIN: at 19:34

## 2025-02-03 RX ADMIN — CALCIUM CHLORIDE INJECTION 200 MG: 100 INJECTION, SOLUTION INTRAVENOUS at 15:58

## 2025-02-03 RX ADMIN — PHENYLEPHRINE HYDROCHLORIDE 100 MCG: 10 INJECTION INTRAVENOUS at 16:28

## 2025-02-03 RX ADMIN — PHENYLEPHRINE HYDROCHLORIDE 100 MCG: 10 INJECTION INTRAVENOUS at 10:02

## 2025-02-03 RX ADMIN — Medication 2 G: at 12:29

## 2025-02-03 RX ADMIN — PROPOFOL 180 MG: 10 INJECTION, EMULSION INTRAVENOUS at 08:29

## 2025-02-03 RX ADMIN — PHENYLEPHRINE HYDROCHLORIDE 100 MCG: 10 INJECTION INTRAVENOUS at 12:06

## 2025-02-03 RX ADMIN — GLYCOPYRROLATE 0.2 MG: 0.2 INJECTION, SOLUTION INTRAMUSCULAR; INTRAVENOUS at 09:27

## 2025-02-03 RX ADMIN — Medication 2 G: at 08:42

## 2025-02-03 RX ADMIN — Medication 30 MG: at 13:42

## 2025-02-03 RX ADMIN — FENTANYL CITRATE 25 MCG: 50 INJECTION INTRAMUSCULAR; INTRAVENOUS at 17:08

## 2025-02-03 RX ADMIN — SODIUM CHLORIDE, SODIUM GLUCONATE, SODIUM ACETATE, POTASSIUM CHLORIDE AND MAGNESIUM CHLORIDE: 526; 502; 368; 37; 30 INJECTION, SOLUTION INTRAVENOUS at 13:52

## 2025-02-03 RX ADMIN — SODIUM CHLORIDE, SODIUM GLUCONATE, SODIUM ACETATE, POTASSIUM CHLORIDE AND MAGNESIUM CHLORIDE: 526; 502; 368; 37; 30 INJECTION, SOLUTION INTRAVENOUS at 08:40

## 2025-02-03 RX ADMIN — EPHEDRINE SULFATE 5 MG: 5 INJECTION INTRAVENOUS at 12:07

## 2025-02-03 RX ADMIN — METHOCARBAMOL 500 MG: 100 INJECTION, SOLUTION INTRAMUSCULAR; INTRAVENOUS at 21:27

## 2025-02-03 RX ADMIN — DEXMEDETOMIDINE HYDROCHLORIDE 4 MCG: 100 INJECTION, SOLUTION INTRAVENOUS at 16:45

## 2025-02-03 RX ADMIN — Medication 1 MG/MIN: at 16:55

## 2025-02-03 RX ADMIN — SODIUM CHLORIDE, SODIUM GLUCONATE, SODIUM ACETATE, POTASSIUM CHLORIDE AND MAGNESIUM CHLORIDE: 526; 502; 368; 37; 30 INJECTION, SOLUTION INTRAVENOUS at 12:50

## 2025-02-03 RX ADMIN — EPHEDRINE SULFATE 5 MG: 5 INJECTION INTRAVENOUS at 15:00

## 2025-02-03 RX ADMIN — FENTANYL CITRATE 100 MCG: 50 INJECTION INTRAMUSCULAR; INTRAVENOUS at 08:28

## 2025-02-03 RX ADMIN — FENTANYL CITRATE 50 MCG: 50 INJECTION INTRAMUSCULAR; INTRAVENOUS at 15:31

## 2025-02-03 RX ADMIN — Medication 30 MG: at 10:32

## 2025-02-03 RX ADMIN — OXYMETAZOLINE HYDROCHLORIDE 3 SPRAY: 0.05 SPRAY NASAL at 16:12

## 2025-02-03 RX ADMIN — EPHEDRINE SULFATE 5 MG: 5 INJECTION INTRAVENOUS at 15:37

## 2025-02-03 RX ADMIN — AMIODARONE HYDROCHLORIDE 150 MG: 1.5 INJECTION, SOLUTION INTRAVENOUS at 16:41

## 2025-02-03 RX ADMIN — Medication 100 MG: at 16:46

## 2025-02-03 ASSESSMENT — ACTIVITIES OF DAILY LIVING (ADL)
ADLS_ACUITY_SCORE: 35
ADLS_ACUITY_SCORE: 44
ADLS_ACUITY_SCORE: 35
ADLS_ACUITY_SCORE: 44
ADLS_ACUITY_SCORE: 35

## 2025-02-03 NOTE — ANESTHESIA PROCEDURE NOTES
Airway       Patient location during procedure: OR       Procedure Start/Stop Times: 2/3/2025 8:33 AM  Staff -        Anesthesiologist:  Arely Abrams MD       CRNA: Kierra Talbert APRN CRNA       Performed By: CRNA  Consent for Airway        Urgency: elective  Indications and Patient Condition       Indications for airway management: geo-procedural       Induction type:RSI       Mask difficulty assessment: 0 - not attempted    Final Airway Details       Final airway type: endotracheal airway       Successful airway: Oral and ETT - single  Endotracheal Airway Details        ETT size (mm): 8.0       Cuffed: yes       Cuff volume (mL): 8       Successful intubation technique: video laryngoscopy       VL Blade Size: Glidescope 4       Grade View of Cords: 2       Adjucts: stylet       Position: Right       Measured from: lips       Secured at (cm): 23       Bite block used: None    Post intubation assessment        Placement verified by: capnometry, equal breath sounds and chest rise        Number of attempts at approach: 1       Number of other approaches attempted: 0       Secured with: tape       Ease of procedure: easy       Dentition: Intact and Unchanged    Medication(s) Administered   Medication Administration Time: 2/3/2025 8:33 AM    Additional Comments       Excessive redundant tissue and large epiglottis, recommend continued use of glidescope for any future intubations.

## 2025-02-03 NOTE — ANESTHESIA PROCEDURE NOTES
Arterial Line Procedure Note    Pre-Procedure   Staff -        Anesthesiologist:  Arely Abrams MD       Performed By: anesthesiologist       Location: OR       Pre-Anesthestic Checklist: patient identified, IV checked, risks and benefits discussed, informed consent, monitors and equipment checked, pre-op evaluation and at physician/surgeon's request  Timeout:       Correct Patient: Yes        Correct Procedure: Yes        Correct Site: Yes        Correct Position: Yes   Line Placement:   This line was placed Post Induction starting at 2/3/2025 8:34 AM and ending at 2/3/2025 8:38 AM  Procedure   Procedure: arterial line and new line       Laterality: left       Insertion Site: radial.  Sterile Prep        Standard elements of sterile barrier followed       Skin prep: Chloraprep  Insertion/Injection        Technique: ultrasound guided and Seldinger Technique        1. Ultrasound was used to evaluate the access site.       2. Artery evaluated via ultrasound for patency/adequacy.       3. Using real-time ultrasound the needle/catheter was observed entering the artery/vein.       5. The visualized structures were anatomically normal.       6. There were no apparent abnormal pathologic findings.       Catheter Type/Size: 20 G, 1.75 in/4.5 cm quick cath (integral wire)  Narrative        Tegaderm dressing used.       Complications: None apparent,        Arterial waveform: Yes        IBP within 10% of NIBP: Yes

## 2025-02-03 NOTE — OR NURSING
Thoracic epidural block delayed case.  End time 0745.  No complaints of symptoms post block per pt.

## 2025-02-03 NOTE — LETTER
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        Concern for non-adherence with plan of care:   Y/N NO   Discharge Needs Assessment:  Needs      Flowsheet Row Most Recent Value   Equipment Currently Used at Home none   # of Referrals Placed by CM External Care Coordination            Already enrolled in Tele-monitoring program and name of program:   No     Follow-up specialty is recommended: Yes    Follow-up plan:    Future Appointments   Date Time Provider Department Center   2025  8:00 AM [facility] WAITLIST [facility] O   3/5/2025 11:15 AM Willy Alvarado MD [facility]       Any outstanding tests or procedures:              Key Recommendations:  Home with [facility] for TF. Please follow for any Cancer resources , if patient will need.     Elham Loja RN    AVS/Discharge Summary is the source of truth; this is a helpful guide for improved communication of patient story

## 2025-02-03 NOTE — BRIEF OP NOTE
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]  Post-operative diagnosis Same as pre-operative diagnosis    Procedure: Laparoscopy, Laparotomy, Left Thoractomy, Total Gastrectomy, Distal Esophagectomy and Omentectomy, Left Chest Tube Placement, N/A - Esophagus  MELONY EN Y ESOPHAGOJEJUNOSTOMY, FEEDING JEJUNOSTOMY, N/A - Esophagus  Esophagoscopy, gastroscopy, duodenoscopy (EGD), combined, N/A - Esophagus    Surgeon: Surgeons and Role:     * Willy Alvarado MD - Primary     * Misbah Lawrence MD - Assisting     * Elizabeth Wright MD - Assisting  Anesthesia: General with Block   Estimated Blood Loss: 500 ml    Drains: Left chest zainab tube  Specimens:   ID Type Source Tests Collected by Time Destination   1 : Diaphragm nodule Tissue Diaphragm, Left SURGICAL PATHOLOGY EXAM Willy Alvarado MD 2/3/2025 10:09 AM    2 : paraesophageal tissue Tissue Esophagus SURGICAL PATHOLOGY EXAM Willy Alvarado MD 2/3/2025 10:13 AM    3 : gastroduodenal artery lymph nodes Tissue Lymph Node(s) SURGICAL PATHOLOGY EXAM Willy Alvarado MD 2/3/2025 12:38 PM    4 : total gastrectomy with distal esophagectomy omentectomy, frozen section on esophageal margin Tissue Gastric Esophageal Junction SURGICAL PATHOLOGY EXAM Willy Alvarado MD 2/3/2025  1:28 PM    5 : left gastric artery lymph node Tissue Lymph Node(s) SURGICAL PATHOLOGY EXAM Willy Alvarado MD 2/3/2025  2:06 PM    6 : common hepatic artery lymph node Tissue Lymph Node(s) SURGICAL PATHOLOGY EXAM Willy Alvarado MD 2/3/2025  2:12 PM    7 : splenic artery lymph node Tissue Lymph Node(s) SURGICAL PATHOLOGY EXAM Willy Alvarado MD 2/3/2025  2:16 PM    8 : Final Esophageal Margin Tissue Esophagus SURGICAL PATHOLOGY EXAM Willy Alvarado MD 2/3/2025  3:20 PM    9 : Final  Final Eshophageal Margin Tissue Esophagus SURGICAL PATHOLOGY EXAM Willy Alvarado MD 2/3/2025  3:29 PM      Findings:  Small diaphragmatic lesion, sent frozen. Returned benign. Positive proximal margin on initial specimen. Left thoracotomy with repeat proximal margin sent permanent. .  Complications: Unintended puncture/laceration of the left lung .  Implants: * No implants in log *    Pain: multimodal, no toradol  Diet: strict NPO, J-tube okay for meds tonight. Start tickle feeds tomorros  DVT ppx: subQ heparin to start in AM  Tubes: NG tube to LIS. Flush q4h with 30cc saline. Do not adjust or manipulate NG. Left chest tube to water seal

## 2025-02-03 NOTE — ANESTHESIA PROCEDURE NOTES
Epidural catheter Procedure Note    Pre-Procedure   Staff -        Anesthesiologist:  Abdelrahman Siegel DO       Resident/Fellow: Mariia Jose DO       Other Anesthesia Staff: Eugene Rao MD       Performed By: anesthesiologist and with residents       Procedure performed by resident/fellow/CRNA in presence of a teaching physician.         Location: pre-op       Procedure Start/Stop Times: 2/3/2025 7:00 AM and 2/3/2025 7:45 AM       Pre-Anesthestic Checklist: patient identified, IV checked, risks and benefits discussed, informed consent, monitors and equipment checked, pre-op evaluation, at physician/surgeon's request and post-op pain management  Timeout:       Correct Patient: Yes        Correct Procedure: Yes        Correct Site: Yes        Correct Position: Yes   Procedure Documentation  Procedure: epidural catheter         Diagnosis: Post-op Pain Control       Patient Position: sitting       Patient Prep/Sterile Barriers: sterile gloves, mask, patient draped       Skin prep: Betadine and Chloraprep       Local skin infiltrated with mL of 1% lidocaine.        Insertion Site: T8-9. (midline approach).       Technique: LORT saline and LORT air        ROMULO at 7 cm.       Needle Type: Touhy needle       Needle Gauge: 17.        Needle Length (Inches): 3.5        Catheter: 19 G.          Catheter threaded easily.         4.5 cm epidural space.         Threaded 11.5 cm at skin.         # of attempts: 3 (Resident x1, Attending x2, Attending x2) and  # of redirects:  3    Assessment/Narrative         Paresthesias: No.       Test dose of 3 mL lidocaine 1.5% w/ 1:200,000 epinephrine at 07:45 CST.         Test dose negative, 3 minutes after injection, for signs of intravascular, subdural, or intrathecal injection.       Insertion/Infusion Method: LORT saline and LORT air       Aspiration negative for Heme or CSF via Epidural Catheter.    Medication(s) Administered   Medication Administration Time: 2/3/2025  "7:00 AM      FOR [facility]) ONLY:   Pain Team Contact information: please page the Pain Team Via [employer]. Search \"Pain\". During daytime hours, please page the attending first. At night please page the resident first.      "

## 2025-02-04 ENCOUNTER — APPOINTMENT (OUTPATIENT)
Dept: GENERAL RADIOLOGY | Facility: CLINIC | Age: 35
End: 2025-02-04
Payer: COMMERCIAL

## 2025-02-04 ENCOUNTER — APPOINTMENT (OUTPATIENT)
Dept: GENERAL RADIOLOGY | Facility: CLINIC | Age: 35
DRG: 327 | End: 2025-02-04
Payer: COMMERCIAL

## 2025-02-04 ENCOUNTER — APPOINTMENT (OUTPATIENT)
Dept: PHYSICAL THERAPY | Facility: CLINIC | Age: 35
End: 2025-02-04
Attending: STUDENT IN AN ORGANIZED HEALTH CARE EDUCATION/TRAINING PROGRAM
Payer: COMMERCIAL

## 2025-02-04 ENCOUNTER — APPOINTMENT (OUTPATIENT)
Dept: OCCUPATIONAL THERAPY | Facility: CLINIC | Age: 35
End: 2025-02-04
Attending: STUDENT IN AN ORGANIZED HEALTH CARE EDUCATION/TRAINING PROGRAM
Payer: COMMERCIAL

## 2025-02-04 LAB
ALLEN'S TEST: ABNORMAL
ANION GAP SERPL CALCULATED.3IONS-SCNC: 11 MMOL/L (ref 7–15)
BASE EXCESS BLDA CALC-SCNC: 1 MMOL/L (ref -3–3)
BUN SERPL-MCNC: 9.8 MG/DL (ref 6–20)
CALCIUM SERPL-MCNC: 8.4 MG/DL (ref 8.8–10.4)
CHLORIDE SERPL-SCNC: 103 MMOL/L (ref 98–107)
CREAT SERPL-MCNC: 0.94 MG/DL (ref 0.67–1.17)
EGFRCR SERPLBLD CKD-EPI 2021: >90 ML/MIN/1.73M2
ERYTHROCYTE [DISTWIDTH] IN BLOOD BY AUTOMATED COUNT: 14.3 % (ref 10–15)
GLUCOSE BLDC GLUCOMTR-MCNC: 143 MG/DL (ref 70–99)
GLUCOSE BLDC GLUCOMTR-MCNC: 145 MG/DL (ref 70–99)
GLUCOSE BLDC GLUCOMTR-MCNC: 147 MG/DL (ref 70–99)
GLUCOSE BLDC GLUCOMTR-MCNC: 149 MG/DL (ref 70–99)
GLUCOSE BLDC GLUCOMTR-MCNC: 149 MG/DL (ref 70–99)
GLUCOSE SERPL-MCNC: 143 MG/DL (ref 70–99)
HBV SURFACE AG SERPL QL IA: NONREACTIVE
HCO3 BLD-SCNC: 25 MMOL/L (ref 21–28)
HCO3 SERPL-SCNC: 21 MMOL/L (ref 22–29)
HCT VFR BLD AUTO: 39 % (ref 40–53)
HGB BLD-MCNC: 13.5 G/DL (ref 13.3–17.7)
HIV 1+2 AB+HIV1 P24 AG SERPL QL IA: NONREACTIVE
MAGNESIUM SERPL-MCNC: 1.7 MG/DL (ref 1.7–2.3)
MCH RBC QN AUTO: 28.7 PG (ref 26.5–33)
MCHC RBC AUTO-ENTMCNC: 34.6 G/DL (ref 31.5–36.5)
MCV RBC AUTO: 83 FL (ref 78–100)
O2/TOTAL GAS SETTING VFR VENT: 32 %
OXYHGB MFR BLDA: 92 % (ref 92–100)
PCO2 BLD: 39 MM HG (ref 35–45)
PEEP: 0 CM H2O
PH BLD: 7.43 [PH] (ref 7.35–7.45)
PHOSPHATE SERPL-MCNC: 2.8 MG/DL (ref 2.5–4.5)
PLATELET # BLD AUTO: 202 10E3/UL (ref 150–450)
PO2 BLD: 64 MM HG (ref 80–105)
POTASSIUM SERPL-SCNC: 4.4 MMOL/L (ref 3.4–5.3)
RBC # BLD AUTO: 4.7 10E6/UL (ref 4.4–5.9)
SAO2 % BLDA: 94.5 % (ref 96–97)
SODIUM SERPL-SCNC: 135 MMOL/L (ref 135–145)
TROPONIN T SERPL HS-MCNC: 11 NG/L
TROPONIN T SERPL HS-MCNC: 11 NG/L
TROPONIN T SERPL HS-MCNC: 14 NG/L
WBC # BLD AUTO: 13.9 10E3/UL (ref 4–11)

## 2025-02-04 PROCEDURE — 99291 CRITICAL CARE FIRST HOUR: CPT | Mod: 24

## 2025-02-04 PROCEDURE — 250N000011 HC RX IP 250 OP 636: Performed by: NURSE PRACTITIONER

## 2025-02-04 PROCEDURE — 74018 RADEX ABDOMEN 1 VIEW: CPT

## 2025-02-04 PROCEDURE — 250N000011 HC RX IP 250 OP 636: Performed by: THORACIC SURGERY (CARDIOTHORACIC VASCULAR SURGERY)

## 2025-02-04 PROCEDURE — 250N000011 HC RX IP 250 OP 636: Performed by: STUDENT IN AN ORGANIZED HEALTH CARE EDUCATION/TRAINING PROGRAM

## 2025-02-04 PROCEDURE — 250N000009 HC RX 250: Performed by: STUDENT IN AN ORGANIZED HEALTH CARE EDUCATION/TRAINING PROGRAM

## 2025-02-04 PROCEDURE — 97530 THERAPEUTIC ACTIVITIES: CPT | Mod: GO

## 2025-02-04 PROCEDURE — 250N000009 HC RX 250: Performed by: NURSE PRACTITIONER

## 2025-02-04 PROCEDURE — 71045 X-RAY EXAM CHEST 1 VIEW: CPT | Mod: 26 | Performed by: RADIOLOGY

## 2025-02-04 PROCEDURE — 71045 X-RAY EXAM CHEST 1 VIEW: CPT

## 2025-02-04 PROCEDURE — 200N000002 HC R&B ICU UMMC

## 2025-02-04 PROCEDURE — 94799 UNLISTED PULMONARY SVC/PX: CPT

## 2025-02-04 PROCEDURE — 250N000013 HC RX MED GY IP 250 OP 250 PS 637: Performed by: SURGERY

## 2025-02-04 PROCEDURE — 85027 COMPLETE CBC AUTOMATED: CPT

## 2025-02-04 PROCEDURE — 74018 RADEX ABDOMEN 1 VIEW: CPT | Mod: 26 | Performed by: RADIOLOGY

## 2025-02-04 PROCEDURE — 94640 AIRWAY INHALATION TREATMENT: CPT

## 2025-02-04 PROCEDURE — 999N000157 HC STATISTIC RCP TIME EA 10 MIN

## 2025-02-04 PROCEDURE — 83735 ASSAY OF MAGNESIUM: CPT

## 2025-02-04 PROCEDURE — 258N000003 HC RX IP 258 OP 636: Performed by: STUDENT IN AN ORGANIZED HEALTH CARE EDUCATION/TRAINING PROGRAM

## 2025-02-04 PROCEDURE — 93005 ELECTROCARDIOGRAM TRACING: CPT

## 2025-02-04 PROCEDURE — 999N000104 HEPATITIS B SURFACE ANTIGEN: Performed by: INTERNAL MEDICINE

## 2025-02-04 PROCEDURE — 80048 BASIC METABOLIC PNL TOTAL CA: CPT

## 2025-02-04 PROCEDURE — 250N000009 HC RX 250

## 2025-02-04 PROCEDURE — 250N000011 HC RX IP 250 OP 636: Performed by: SURGERY

## 2025-02-04 PROCEDURE — 82805 BLOOD GASES W/O2 SATURATION: CPT

## 2025-02-04 PROCEDURE — 99222 1ST HOSP IP/OBS MODERATE 55: CPT | Mod: 24 | Performed by: INTERNAL MEDICINE

## 2025-02-04 PROCEDURE — 250N000009 HC RX 250: Performed by: THORACIC SURGERY (CARDIOTHORACIC VASCULAR SURGERY)

## 2025-02-04 PROCEDURE — 97165 OT EVAL LOW COMPLEX 30 MIN: CPT | Mod: GO

## 2025-02-04 PROCEDURE — 97530 THERAPEUTIC ACTIVITIES: CPT | Mod: GP

## 2025-02-04 PROCEDURE — 250N000013 HC RX MED GY IP 250 OP 250 PS 637: Performed by: STUDENT IN AN ORGANIZED HEALTH CARE EDUCATION/TRAINING PROGRAM

## 2025-02-04 PROCEDURE — 250N000012 HC RX MED GY IP 250 OP 636 PS 637

## 2025-02-04 PROCEDURE — 250N000011 HC RX IP 250 OP 636

## 2025-02-04 PROCEDURE — 250N000013 HC RX MED GY IP 250 OP 250 PS 637

## 2025-02-04 PROCEDURE — 93010 ELECTROCARDIOGRAM REPORT: CPT | Mod: 76 | Performed by: INTERNAL MEDICINE

## 2025-02-04 PROCEDURE — 84484 ASSAY OF TROPONIN QUANT: CPT | Performed by: SURGERY

## 2025-02-04 PROCEDURE — 84100 ASSAY OF PHOSPHORUS: CPT

## 2025-02-04 PROCEDURE — 94640 AIRWAY INHALATION TREATMENT: CPT | Mod: 76

## 2025-02-04 PROCEDURE — 97161 PT EVAL LOW COMPLEX 20 MIN: CPT | Mod: GP

## 2025-02-04 PROCEDURE — 250N000013 HC RX MED GY IP 250 OP 250 PS 637: Performed by: THORACIC SURGERY (CARDIOTHORACIC VASCULAR SURGERY)

## 2025-02-04 RX ORDER — METOPROLOL TARTRATE 1 MG/ML
5 INJECTION, SOLUTION INTRAVENOUS EVERY 6 HOURS
Status: DISCONTINUED | OUTPATIENT
Start: 2025-02-04 | End: 2025-02-04

## 2025-02-04 RX ORDER — ACETYLCYSTEINE 200 MG/ML
2 SOLUTION ORAL; RESPIRATORY (INHALATION) EVERY 4 HOURS
Status: DISCONTINUED | OUTPATIENT
Start: 2025-02-04 | End: 2025-02-05

## 2025-02-04 RX ORDER — ALBUTEROL SULFATE 0.83 MG/ML
2.5 SOLUTION RESPIRATORY (INHALATION)
Status: DISCONTINUED | OUTPATIENT
Start: 2025-02-04 | End: 2025-02-05

## 2025-02-04 RX ORDER — DEXTROSE MONOHYDRATE 25 G/50ML
25-50 INJECTION, SOLUTION INTRAVENOUS
Status: DISCONTINUED | OUTPATIENT
Start: 2025-02-04 | End: 2025-02-11 | Stop reason: HOSPADM

## 2025-02-04 RX ORDER — ACETAMINOPHEN 325 MG/10.15ML
975 LIQUID ORAL EVERY 8 HOURS
Status: DISCONTINUED | OUTPATIENT
Start: 2025-02-04 | End: 2025-02-11 | Stop reason: HOSPADM

## 2025-02-04 RX ORDER — METOPROLOL TARTRATE 1 MG/ML
5 INJECTION, SOLUTION INTRAVENOUS EVERY 6 HOURS
Status: DISCONTINUED | OUTPATIENT
Start: 2025-02-04 | End: 2025-02-05

## 2025-02-04 RX ORDER — DEXTROSE MONOHYDRATE 100 MG/ML
INJECTION, SOLUTION INTRAVENOUS CONTINUOUS PRN
Status: DISCONTINUED | OUTPATIENT
Start: 2025-02-04 | End: 2025-02-11 | Stop reason: HOSPADM

## 2025-02-04 RX ORDER — MAGNESIUM SULFATE HEPTAHYDRATE 40 MG/ML
2 INJECTION, SOLUTION INTRAVENOUS ONCE
Status: COMPLETED | OUTPATIENT
Start: 2025-02-04 | End: 2025-02-04

## 2025-02-04 RX ORDER — ENOXAPARIN SODIUM 100 MG/ML
40 INJECTION SUBCUTANEOUS EVERY 24 HOURS
Status: DISCONTINUED | OUTPATIENT
Start: 2025-02-04 | End: 2025-02-11 | Stop reason: HOSPADM

## 2025-02-04 RX ORDER — AMIODARONE HYDROCHLORIDE 200 MG/1
400 TABLET ORAL 2 TIMES DAILY
Status: DISCONTINUED | OUTPATIENT
Start: 2025-02-04 | End: 2025-02-04

## 2025-02-04 RX ORDER — NICOTINE POLACRILEX 4 MG
15-30 LOZENGE BUCCAL
Status: DISCONTINUED | OUTPATIENT
Start: 2025-02-04 | End: 2025-02-11 | Stop reason: HOSPADM

## 2025-02-04 RX ORDER — POLYETHYLENE GLYCOL 3350 17 G/17G
17 POWDER, FOR SOLUTION ORAL DAILY
Status: DISCONTINUED | OUTPATIENT
Start: 2025-02-04 | End: 2025-02-11 | Stop reason: HOSPADM

## 2025-02-04 RX ORDER — AMIODARONE HYDROCHLORIDE 200 MG/1
200 TABLET ORAL DAILY
Status: DISCONTINUED | OUTPATIENT
Start: 2025-02-10 | End: 2025-02-04

## 2025-02-04 RX ORDER — AMOXICILLIN 250 MG
1 CAPSULE ORAL AT BEDTIME
Status: DISCONTINUED | OUTPATIENT
Start: 2025-02-04 | End: 2025-02-04

## 2025-02-04 RX ORDER — MAGNESIUM SULFATE HEPTAHYDRATE 40 MG/ML
4 INJECTION, SOLUTION INTRAVENOUS ONCE
Status: COMPLETED | OUTPATIENT
Start: 2025-02-04 | End: 2025-02-04

## 2025-02-04 RX ORDER — GUAIFENESIN 600 MG/1
15 TABLET, EXTENDED RELEASE ORAL DAILY
Status: DISCONTINUED | OUTPATIENT
Start: 2025-02-04 | End: 2025-02-09

## 2025-02-04 RX ORDER — AMIODARONE HYDROCHLORIDE 200 MG/1
200 TABLET ORAL 2 TIMES DAILY
Status: DISCONTINUED | OUTPATIENT
Start: 2025-02-08 | End: 2025-02-04

## 2025-02-04 RX ORDER — METHOCARBAMOL 100 MG/ML
1000 INJECTION, SOLUTION INTRAMUSCULAR; INTRAVENOUS EVERY 8 HOURS
Status: COMPLETED | OUTPATIENT
Start: 2025-02-04 | End: 2025-02-05

## 2025-02-04 RX ADMIN — HYDROMORPHONE HYDROCHLORIDE: 1 INJECTION, SOLUTION INTRAMUSCULAR; INTRAVENOUS; SUBCUTANEOUS at 22:02

## 2025-02-04 RX ADMIN — MAGNESIUM SULFATE HEPTAHYDRATE 2 G: 40 INJECTION, SOLUTION INTRAVENOUS at 11:56

## 2025-02-04 RX ADMIN — METHOCARBAMOL 1000 MG: 100 INJECTION, SOLUTION INTRAMUSCULAR; INTRAVENOUS at 13:22

## 2025-02-04 RX ADMIN — AMIODARONE HYDROCHLORIDE 400 MG: 200 TABLET ORAL at 11:55

## 2025-02-04 RX ADMIN — METHOCARBAMOL 1000 MG: 100 INJECTION, SOLUTION INTRAMUSCULAR; INTRAVENOUS at 20:10

## 2025-02-04 RX ADMIN — ACETYLCYSTEINE 2 ML: 200 SOLUTION ORAL; RESPIRATORY (INHALATION) at 12:52

## 2025-02-04 RX ADMIN — METOPROLOL TARTRATE 5 MG: 5 INJECTION INTRAVENOUS at 21:10

## 2025-02-04 RX ADMIN — SODIUM CHLORIDE, POTASSIUM CHLORIDE, SODIUM LACTATE AND CALCIUM CHLORIDE: 600; 310; 30; 20 INJECTION, SOLUTION INTRAVENOUS at 12:00

## 2025-02-04 RX ADMIN — ALBUTEROL SULFATE 2.5 MG: 2.5 SOLUTION RESPIRATORY (INHALATION) at 12:51

## 2025-02-04 RX ADMIN — ACETYLCYSTEINE 2 ML: 200 SOLUTION ORAL; RESPIRATORY (INHALATION) at 16:23

## 2025-02-04 RX ADMIN — METHOCARBAMOL 500 MG: 100 INJECTION, SOLUTION INTRAMUSCULAR; INTRAVENOUS at 04:52

## 2025-02-04 RX ADMIN — ENOXAPARIN SODIUM 40 MG: 40 INJECTION SUBCUTANEOUS at 11:56

## 2025-02-04 RX ADMIN — ACETYLCYSTEINE 2 ML: 200 SOLUTION ORAL; RESPIRATORY (INHALATION) at 20:58

## 2025-02-04 RX ADMIN — ALBUTEROL SULFATE 2.5 MG: 2.5 SOLUTION RESPIRATORY (INHALATION) at 16:23

## 2025-02-04 RX ADMIN — MAGNESIUM SULFATE IN WATER 4 G: 40 INJECTION, SOLUTION INTRAVENOUS at 16:01

## 2025-02-04 RX ADMIN — POLYETHYLENE GLYCOL 3350 17 G: 17 POWDER, FOR SOLUTION ORAL at 10:05

## 2025-02-04 RX ADMIN — AMIODARONE HYDROCHLORIDE 400 MG: 200 TABLET ORAL at 19:36

## 2025-02-04 RX ADMIN — PANTOPRAZOLE SODIUM 40 MG: 40 INJECTION, POWDER, FOR SOLUTION INTRAVENOUS at 07:34

## 2025-02-04 RX ADMIN — ACETAMINOPHEN 975 MG: 160 SOLUTION ORAL at 18:15

## 2025-02-04 RX ADMIN — INSULIN ASPART 1 UNITS: 100 INJECTION, SOLUTION INTRAVENOUS; SUBCUTANEOUS at 23:24

## 2025-02-04 RX ADMIN — SENNOSIDES 5 ML: 8.8 LIQUID ORAL at 21:12

## 2025-02-04 RX ADMIN — Medication 15 ML: at 10:05

## 2025-02-04 RX ADMIN — SODIUM CHLORIDE, POTASSIUM CHLORIDE, SODIUM LACTATE AND CALCIUM CHLORIDE: 600; 310; 30; 20 INJECTION, SOLUTION INTRAVENOUS at 23:31

## 2025-02-04 RX ADMIN — ALBUTEROL SULFATE 2.5 MG: 2.5 SOLUTION RESPIRATORY (INHALATION) at 20:58

## 2025-02-04 RX ADMIN — INSULIN ASPART 1 UNITS: 100 INJECTION, SOLUTION INTRAVENOUS; SUBCUTANEOUS at 19:40

## 2025-02-04 RX ADMIN — ACETAMINOPHEN 975 MG: 325 TABLET, FILM COATED ORAL at 10:05

## 2025-02-04 RX ADMIN — METOPROLOL TARTRATE 5 MG: 5 INJECTION INTRAVENOUS at 16:02

## 2025-02-04 ASSESSMENT — ACTIVITIES OF DAILY LIVING (ADL)
ADLS_ACUITY_SCORE: 44
ADLS_ACUITY_SCORE: 45
ADLS_ACUITY_SCORE: 44
ADLS_ACUITY_SCORE: 46
ADLS_ACUITY_SCORE: 44
ADLS_ACUITY_SCORE: 44
ADLS_ACUITY_SCORE: 46
ADLS_ACUITY_SCORE: 46
ADLS_ACUITY_SCORE: 45
ADLS_ACUITY_SCORE: 44
ADLS_ACUITY_SCORE: 45
ADLS_ACUITY_SCORE: 44
ADLS_ACUITY_SCORE: 45
ADLS_ACUITY_SCORE: 46
ADLS_ACUITY_SCORE: 44
ADLS_ACUITY_SCORE: 46
ADLS_ACUITY_SCORE: 44
ADLS_ACUITY_SCORE: 46
ADLS_ACUITY_SCORE: 46

## 2025-02-04 NOTE — PHARMACY-CONSULT NOTE
Pharmacy Tube Feeding Consult    Medication reviewed for administration by feeding tube and for potential food/drug interactions.    Recommendation: No changes are needed at this time.     Pharmacy will continue to follow as new medications are ordered.    Casper Archer, PharmD, PGY2 Critical Care Pharmacy Resident

## 2025-02-04 NOTE — ANESTHESIA POSTPROCEDURE EVALUATION
Patient: Prashant Allen    Procedure: Procedure(s):  Laparoscopy, Laparotomy, Left Thoractomy, Total Gastrectomy, Distal Esophagectomy and Omentectomy, Left Chest Tube Placement  MELONY EN Y ESOPHAGOJEJUNOSTOMY, FEEDING JEJUNOSTOMY  Esophagoscopy, gastroscopy, duodenoscopy (EGD), combined       Anesthesia Type:  General    Note:  Disposition: ICU   Postop Pain Control: Uneventful            Sign Out: Well controlled pain   PONV: No   Neuro/Psych: Uneventful            Sign Out: Acceptable/Baseline neuro status   Airway/Respiratory: Uneventful            Sign Out: Acceptable/Baseline resp. status   CV/Hemodynamics: Uneventful            Sign Out: Acceptable CV status; No obvious hypovolemia; No obvious fluid overload   Other NRE: NONE   DID A NON-ROUTINE EVENT OCCUR? No           Last vitals:  Vitals Value Taken Time   /83 02/03/25 1930   Temp 36.7  C (98.1  F) 02/03/25 1830   Pulse 94 02/03/25 1943   Resp 23 02/03/25 1943   SpO2 100 % 02/03/25 1943   Vitals shown include unfiled device data.    Electronically Signed By: Yordan Eddy MD  February 3, 2025  7:43 PM

## 2025-02-04 NOTE — PROGRESS NOTES
THORACIC SURGERY PROGRESS NOTE     S: reports his pain is well controlled. Denies nausea or vomiting. Not yet passing flatus or having Bms. Has not ambulated (got out of the OR last evening)     O:  Patient Vitals for the past 24 hrs:   BP Temp Temp src Pulse Resp SpO2 Weight   02/04/25 1030 -- -- -- 92 -- 95 % --   02/04/25 1015 -- -- -- 96 -- 95 % --   02/04/25 1000 113/69 -- -- 97 -- 95 % --   02/04/25 0945 -- -- -- 94 -- 94 % --   02/04/25 0930 -- -- -- 99 -- 96 % --   02/04/25 0915 -- -- -- 93 -- 95 % --   02/04/25 0900 -- -- -- 93 -- 95 % --   02/04/25 0845 -- -- -- 93 -- 96 % --   02/04/25 0830 -- -- -- 91 -- 96 % --   02/04/25 0815 -- -- -- 95 -- 95 % --   02/04/25 0800 -- 100.5  F (38.1  C) Oral 101 22 96 % --   02/04/25 0745 -- -- -- 96 -- 95 % --   02/04/25 0730 -- -- -- 101 -- 94 % --   02/04/25 0715 -- -- -- 97 -- 95 % --   02/04/25 0700 -- -- -- 101 -- 95 % --   02/04/25 0600 -- -- -- 97 -- 93 % --   02/04/25 0500 -- -- -- 103 -- 92 % --   02/04/25 0400 -- 99.7  F (37.6  C) Oral 111 24 93 % --   02/04/25 0300 -- -- -- 101 -- 93 % --   02/04/25 0200 -- -- -- 98 -- 94 % --   02/04/25 0100 -- -- -- 104 -- 94 % --   02/04/25 0000 -- 98.7  F (37.1  C) Oral 102 18 97 % --   02/03/25 2300 -- -- -- 90 -- 98 % --   02/03/25 2200 -- 100.9  F (38.3  C) -- 87 -- 97 % --   02/03/25 2100 -- 100.8  F (38.2  C) -- 93 -- 98 % --   02/03/25 2007 -- 100.4  F (38  C) Bladder 101 25 100 % 100.2 kg (220 lb 14.4 oz)   02/03/25 2000 132/86 -- -- 95 27 100 % --   02/03/25 1930 123/83 -- -- 91 17 99 % --   02/03/25 1915 132/86 -- -- 95 19 100 % --   02/03/25 1900 124/86 -- -- 88 16 96 % --   02/03/25 1845 131/80 -- -- 89 19 97 % --   02/03/25 1830 128/81 98.1  F (36.7  C) Axillary 87 18 96 % --   02/03/25 1815 132/84 -- -- 88 19 99 % --   02/03/25 1800 127/82 -- -- 88 19 100 % --   02/03/25 1745 127/78 -- -- 83 16 100 % --   02/03/25 1730 116/77 -- -- 88 16 98 % --        Gen: alert and conversatoinal adult male in NAD  Resp:  non labored breathing on 3L OM, left sided chest tube with serosang drainage, air leak when placed to suction  CV: RRR  Abd: soft, minimally tender, mildly distended, J tube in place. 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       Pain/neuro : epidural, discuss with pain if needing increased pain control  CV: amio ggt for afib prophylaxis  Resp: wean O2 as able, continue chest tube to suction. Daily CXR  GI: continue PPI, NG tube to LIS. Strict NPO. Start trickle feeds per J tube today  Hem: daily CBCs  PPx: heparin for DVT ppx. Protonix as above  Renal/FEN: mIVF  PT/OT/SW: consults placed  Dispoto [facility] this afternoon pending continued improvement in clinical course     Seen independently and discussed with staff, Dr. Christiano Lawrence MD  [place] Resident, General Surgery  Thoracic Surgery Service

## 2025-02-04 NOTE — PROGRESS NOTES
"EKG obtained by bedside nurse due to concern of ST elevation on telemetry lead. EKG was read by computer as \"Acute MI/STEM\", with possible inferolateral injury/acute infarct. No reciprocal changes by my read, troponin 11/negative. Repeat EKG 30 minutes later with redemonstration of ST segments seen on prior EKG, unchanged. Patient asymptomatic throughout, no SOB, no chest pain. HR 80s, BP wnl. Low suspicion for cardiac ischemia given age/clinical situation and my evaluation of EKG in setting of normal high sensitivity troponin. Discussed with CICU attending, who agrees suspicion for ischemia is low. Will obtain formal cardiology consult and recheck troponin.     Erasto Segundo  SICU fellow    Discussed with Dr. Wilkinson who agrees with plan/workup performed  "

## 2025-02-04 NOTE — PROGRESS NOTES
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    Procedure: Laparoscopy, Laparotomy, Left Thoractomy, Total Gastrectomy, Distal Esophagectomy and Omentectomy, Left Chest Tube Placement  MELONY EN Y ESOPHAGOJEJUNOSTOMY, FEEDING JEJUNOSTOMY  Esophagoscopy, gastroscopy, duodenoscopy (EGD), combined    Date of Surgery: 2/3/2025    Date of Catheter Placement: 2/3/2025    Plan/Recommendations:  1. Regional Anesthesia/Analgesia  -Continuous Catheter Type/Site: Thoracic Epidurlal T8-9  Infusate: Hydromorphone 6 mcg/mL, Bupivicaine 0.0625% in sodium chloride 0.9% 250 mL PIB + PCEA    Programmed Intermittent Bolus (PIB) at 7 mL Q60 min via each catheter, total infusion rate of 7 mL/hr    Plan to maintain catheter 7, max of 7 days    2. Anticoagulation  -Please contact Inpatient Pain Service before ordering or making any anticoagulation changes       3. Multimodal Analgesia  - Thoracic Epidurlal T8-9  Infusate: Hydromorphone 6 mcg/mL, Bupivicaine 0.0625% in sodium chloride 0.9% 250 mL PIB + PCEA  - Scheduled Tylenol 975 mg q8h  - Scheduled Robaxin 1000 mg q8h    Pain Service will continue to follow.    Discussed with attending anesthesiologist    Adry Fox MD, MPH  Anesthesiology, PGY3, CA2  Inpatient Pain Service   02/04/2025     Overnight Events: NAEO.    Tubes/Drains: Yes      Subjective:  " I'm not having much pain. My pain is much better than yesterday.\" Pt reports incisional pain, which the epidural has been covering. Pt reports episode of breakthrough pain yesterday that improved with Robaxin.  Nausea: No  Vomiting: No  Pruritus: No  Symptoms of LAST: No    Pain Location:  Incisional pain    Pain Intensity:    Pain at Rest: 3/10   Pain with Activity: NA. Pt not out of bed yet. Plan for pt to work with PT this AM.  Comfort Goal: 3/10   Baseline Pain: 0/10   Satisfied with your level of pain control: Yes    Diet: NPO for Medical/Clinical Reasons Except for: No Exceptions  Adult Formula Drip Feeding: Continuous Pivot 1.5; Jejunostomy; Goal Rate: 20 mL/hr (goal for now) - start TF @ 10 mL/hr. If tolerated, in 6 hours, advance to 20 mL/hr and keep at this rate.; mL/hr; Do not advance tube feeding rate unless K+ is = o...    Relevant Labs:  Recent Labs   Lab Test 02/04/25  0512      BUN 9.8       Physical Exam:  Vitals: /69   Pulse 92   Temp 38.1  C (100.5  F) (Oral)   Resp 22   Ht 1.88 m (6' 2\")   Wt 100.2 kg (220 lb 14.4 oz)   SpO2 95%   BMI 28.36 kg/m      Physical Exam:   Orientation:  Alert, oriented, and in no acute distress: Yes  Sedation: No    Motor Examination:  5/5 Strength in lower extremities: Yes    Sensory Level:   Decrease in sensation: Yes    Catheter Site:   Catheter entry site is clean/dry/intact: Yes    Tender: No      Relevant Medications:  Current Pain Medications:  Medications related to Pain Management (From now, onward)      Start     Dose/Rate Route Frequency Ordered Stop    02/04/25 1300  methocarbamol (ROBAXIN) injection 1,000 mg         1,000 mg Intravenous EVERY 8 HOURS 02/04/25 0704 02/05/25 2059 02/04/25 0800  polyethylene glycol (MIRALAX) Packet 17 g         17 g Per Feeding Tube DAILY 02/04/25 0533 02/04/25 0600  senna-docusate (SENOKOT-S/PERICOLACE) 8.6-50 MG per tablet 1 tablet         1 tablet Per Feeding Tube AT BEDTIME 02/04/25 0533      " "02/03/25 2000  acetaminophen (TYLENOL) tablet 975 mg         975 mg Per J Tube EVERY 8 HOURS 02/03/25 1933      02/03/25 0830  HYDROmorphone (DILAUDID) 6 mcg/mL, BUPivacaine (MARCAINE) 0.0625 % in sodium chloride 0.9 % 250 mL PIB + PCEA          EPIDURAL PIB 02/03/25 0801      02/03/25 0756  nalbuphine (NUBAIN) injection 2.5-5 mg         2.5-5 mg Intravenous EVERY 6 HOURS PRN 02/03/25 0801              Primary Service Contacted with Recommendations? Yes      Please see A&P for additional details of medical decision making.  MANAGEMENT DISCUSSED with the following over the past 24 hours: Dr. Abdelrahman Siegel       Acute Inpatient Pain Service [facility]  Hours of pain coverage 24/7   Page via Amcom- Please Page the Pain Team Via [facility]: \"PAIN MANAGEMENT ACUTE INPATIENT/ [facility]\"       Adry Fox MD, MPH  Anesthesiology, PGY3, CA2  Inpatient Pain Service       "

## 2025-02-04 NOTE — PROGRESS NOTES
CLINICAL NUTRITION SERVICES - ASSESSMENT NOTE    RECOMMENDATIONS FOR MDs/PROVIDERS TO ORDER:  TF rate advancement per Thoracic discretion.     Malnutrition Status:    Patient does not meet two of the established criteria necessary for diagnosing malnutrition    Registered Dietitian Interventions:  EN support via J-tube - trophic only, up to 20 mL/hr ok per SICU:  Pivot 1.5 @ 20 mL/hr (trophic)  - Initiate @ 10 mL/hr and if tolerated, after 6 hour advance to 20 mL/hr and do not advance beyond 20 mL/hr at this time.   - Do not advance unless K+ >/= 3, Mg++ >/=1.5, and phos >/=1.9  - 30 mL q4hr fluid flushes for tube patency. Additional fluids and/or adjustments per MD.   - Multivitamin/mineral (15 mL/day via FT) to help ensure micronutrient needs being met with suspected hypermetabolic demands and potential interruptions to TF infusions.    Provided brief overview of eventual diet modifications (small, frequent meals mainly)    Future/Additional Recommendations:  Pt is s/p total gastrectomy with EJ anastomosis - diet education as able. Will need small, frequent meals once diet advanced and be on bariatric MVI/mineral regimen.      Vitamin/Mineral Recommendations for longer-term:         - [place]'s Complete chewable multivitamin/mineral twice daily  - 500-600 mg calcium citrate 2-3 times daily (i.e. 1200 - 1500 mg Ca daily)   - 5000 international units Vitamin D daily   - 1000 mcg Vitamin B12 injection monthly -OR-- 350-500 mcg sublingual (under tongue) Vitamin B12 daily   - One B50 complex tablet daily     GOAL TF recs for initial start of feeds:   Pivot 1.5 Arturo (or equivalent)  @ goal of  75ml/hr  (1800ml/day) provides: 2700 kcals (27 kcal/kg), 168 g PRO (1.7 g pro/kg), 1350 ml free H20, 310 g CHO, and 13 g fiber daily.   - Advance by 10 mL q8hr as tolerated to goal rate.   - Do not advance unless K+ >/= 3, Mg++ >/=1.5, and phos >/=1.9    For maintenance/prior to discharge, rec:  TwoCal HN @ 55 mL/hr (1320  "mL/day) + 1 pkt ProSource TF20 TID (3 pkts total) to provide 2880 kcals (29 kcal/kg/day), 171 g PRO (1.7 g/kg/day), 924 mL H2O, 289 g CHO and 7 g Fiber daily.      REASON FOR ASSESSMENT  Positive admission nutrition risk screen and Provider order - Registered Dietitian to order TF per Medical Nutrition Therapy Guidelines    SUBJECTIVE INFORMATION  Assessed patient in room.    NUTRITION HISTORY  Pt not previously known to Clinical Nutrition.    No food allergies noted in chart.    Met with pt at bedside. He shares he had lost a little weight just before chemo (from 230 lbs to 210 lbs) and then regained a little back afterward d/t being able to tolerate PO better. Shares he has maybe been eating a little less than usual at meals but not a significant reduction. Usually eats 3 meals a day.    CURRENT NUTRITION ORDERS  Diet: NPO    CURRENT INTAKE/TOLERANCE  100% of one meal noted this admit yesterday 2/3.    LABS  Nutrition-relevant labs: Reviewed    MEDICATIONS  Nutrition-relevant medications: Reviewed    ANTHROPOMETRICS  Height: 188 cm (6' 2\")  Most Recent Weight: 100.2 kg (220 lb 14.4 oz)  IBW: 86.4 kg   % IBW: 116%  BMI (kg/m ): 28.36 kg/m2; Overweight BMI 25-29.9  Weight History: No significant recent wt loss noted - pt reports wt regain after chemo  Wt Readings from Last 20 Encounters:   02/03/25 100.2 kg (220 lb 14.4 oz)   01/15/25 102.3 kg (225 lb 8.5 oz)   01/15/25 102.3 kg (225 lb 9.6 oz)   11/06/24 93.9 kg (207 lb 1.6 oz)   09/30/24 99.7 kg (219 lb 12.8 oz)   09/25/24 101.3 kg (223 lb 4.8 oz)   Dosing Weight: 100 kg, based on actual wt    ASSESSED NUTRITION NEEDS  Estimated Energy Needs: 3297-2199 kcals/day (25 - 30 kcals/kg)  Justification: Maintenance  Estimated Protein Needs: 150-200 grams protein/day (1.5 - 2 grams of pro/kg)  Justification: Hypercatabolism with critical illness and Post-op  Estimated Fluid Needs: 1 mL/kcal  Justification: Maintenance and Per provider pending fluid status    SYSTEM " FINDINGS    Skin/wounds: Surgical incisions to L lateral abdomen, medial abdomen, L flank    GI symptoms: Distended, soft, tender abdomen, hypoactive bowel sounds per RN flowsheets. No BM this admit. S/p Laparoscopy, Laparotomy, Left Thoractomy, Total Gastrectomy, Distal Esophagectomy and Omentectomy, Mode En Y esophagojejunostomy, J-tube placement for treatment of Seiwert Type 3 esophageal junction adenocarcinoma on 2/3    MALNUTRITION  % Intake: Decreased intake does not meet criteria  % Weight Loss: Weight loss does not meet criteria   Subcutaneous Fat Loss: None observed - pt reports at baseline physique  Muscle Loss: None observed - pt denies muscle loss  Fluid Accumulation/Edema: None noted  Malnutrition Diagnosis: Patient does not meet two of the established criteria necessary for diagnosing malnutrition  Malnutrition Present on Admission: No    NUTRITION DIAGNOSIS  Altered gastrointestinal (GI) function related to s/p total gastrectomy, distal esophagectomy, with RnY esophagojejunostomy as evidenced by J-tube placement and consult for EN support to meet nutrition needs at this time.    INTERVENTIONS  Collaboration by nutrition professional with other providers  Enteral nutrition management  Vitamin and mineral supplement therapy    Goals  Total avg nutritional intake to meet a minimum of 25 kcal/kg and 1.5 g PRO/kg daily (per dosing wt 100 kg).     Monitoring/Evaluation  Progress toward goals will be monitored and evaluated per policy.    Annette Torres RD, LD  Available on Vocera - can search by name or unit Dietitian  **Clinical Nutrition is no longer available via pager

## 2025-02-04 NOTE — PROGRESS NOTES
02/04/25 0820   Appointment Info   Signing Clinician's Name / Credentials (OT) Patricia Moore OTR/L   Rehab Comments (OT) L thora; Protective Abdominal; Strict NGT>LIS; L CT>suction   Living Environment   People in Home sibling(s)  (brother)   Current Living Arrangements house   Home Accessibility stairs to enter home;stairs within home   Number of Stairs, Main Entrance 3   Stair Railings, Main Entrance   (unilateral)   Number of Stairs, Within Home, Primary greater than 10 stairs   Stair Railings, Within Home, Primary   (unilateral)   Transportation Anticipated family or friend will provide   Living Environment Comments Pt reports he lives with his brother in a multi-level home, ~ 3 [address] with unilateral railings, one flight to basement w unilateral railings where he has bedroom, bathroom, and small kitchen. Pt has WIS with small threshold to enter.   Self-Care   Usual Activity Tolerance excellent   Current Activity Tolerance moderate   Regular Exercise No   Equipment Currently Used at Home none   Fall history within last six months no   Activity/Exercise/Self-Care Comment Pt reports he is IND with all mobility and I/ADLs at baseline, works full time but will be taking off at least 4 weeks. Work typically requires heavy lifting but he is able to deligate tasks that require heavy exertion/lifting if needed. Pt denies regular exercise, enjoys reading and playing videogames in his spare time.   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   Existing Precautions/Restrictions fall;thoracotomy;abdominal  (LUE)   General Observations and Info ADULT ICU Early Mobility Protocol   Cognitive Status Examination   Cognitive Status Comments WFL   Visual Perception   Visual Impairment/Limitations corrective lenses full-time   Sensory   Sensory Comments SILT; T8-9 epidural in place   Pain Assessment   Patient Currently in Pain   (NRS 3/10 pre/post mobility)   Range of Motion Comprehensive   Comment, General Range of Motion WFL, some guarding d/t pain   Strength Comprehensive (MMT)   Comment, General Manual Muscle Testing (MMT) Assessment No formal MMT, appears WFL for basic self-cares/mobility, no focal impairments noted   Bed Mobility   Bed Mobility supine-sit;sit-supine   Comment (Bed Mobility) MIN x 2   Transfers   Transfers sit-stand transfer;toilet transfer;shower transfer   Transfer Comments HHA   Balance   Balance Comments HHA for steadying   Activities of Daily Living   BADL Assessment/Intervention bathing;lower body dressing;upper body dressing;toileting;grooming   Bathing Assessment/Intervention   Comment, (Bathing) assist x 1 per clinical judgement   Upper Body Dressing Assessment/Training   Comment, (Upper Body Dressing) assist x 1 per clinical judgement   Lower Body Dressing Assessment/Training   Comment, (Lower Body Dressing) assist x 1 per clinical judgement   Grooming Assessment/Training   Comment, (Grooming) assist x 1 per clinical judgement   Toileting   Comment, (Toileting) assist x 1 per clinical judgement   Clinical Impression   Criteria for Skilled Therapeutic Interventions Met (OT) Yes, treatment indicated   OT Diagnosis impaired ADLs, impaired functional mobility   OT Problem List-Impairments impacting ADL problems  related to;activity tolerance impaired;mobility;pain;post-surgical precautions   Assessment of Occupational Performance 3-5 Performance Deficits   Identified Performance Deficits bathing, dressing, g/h, toileting, functional mobility, home mgmt, work mgmt   Planned Therapy Interventions (OT) ADL retraining;IADL retraining;bed mobility training;strengthening;stretching;transfer training;home program guidelines;progressive activity/exercise   Clinical Decision Making Complexity (OT) problem focused assessment/low complexity   Risk & Benefits of therapy have been explained evaluation/treatment results reviewed;care plan/treatment goals reviewed;risks/benefits reviewed;current/potential barriers reviewed;participants voiced agreement with care plan;participants included;patient   Clinical Impression Comments Pt remains below his baseline functional status, will benefit from ongoing skilled OT while IP to progress IND/safety within daily routine.   OT Total Evaluation Time   OT Eval, Low Complexity Minutes (28965) 6   OT Goals   Therapy Frequency (OT) 5 times/week   OT Predicted Duration/Target Date for Goal Attainment 03/06/25   OT Goals Hygiene/Grooming;Upper Body Dressing;Lower Body Dressing;Lower Body Bathing;Toilet Transfer/Toileting;Home Management;OT Goal 1;OT Goal 2   OT: Hygiene/Grooming modified independent;within precautions;while standing   OT: Upper Body Dressing Modified independent;within precautions;including set-up/clothing retrieval   OT: Lower Body Dressing Modified independent;within precautions;including set-up/clothing retrieval   OT: Lower Body Bathing Modified independent;with precautions   OT: Toilet Transfer/Toileting Modified independent;toilet transfer;cleaning and garment management;within precautions   OT: Home Management Supervision/stand-by assist;with light demand household tasks;within precautions;ambulatory level   OT: Goal 1 Pt will independently integrate post-op prec into daily  routinem as evidenced by not requiring any cuing for adherence.   OT: Goal 2 Pt will IND complete thora HEP in order to optimize functional ROM for daily routine.   OT Discharge Planning   OT Plan reinf prec; initiate thora HEP; toileting; standing ADLs; progress mobility   OT Discharge Recommendation (DC Rec) home with assist   OT Rationale for DC Rec Pt remains primarily limited by: post-op pain, precautions and dec activity tolerance. He is currently req assist x 1 for ADLs/functional mobility. Anticipate will progress to d/c home with assist prn from brother for heavy i/ADLs.   OT Brief overview of current status Assist x 1   OT Total Distance Amb During Session (feet) 5   Total Session Time   Total Session Time (sum of timed and untimed services) 6

## 2025-02-04 NOTE — PHARMACY-ADMISSION MEDICATION HISTORY
Pharmacist Admission Medication History    Admission medication history is complete. The information provided in this note is only as accurate as the sources available at the time of the update.    Information Source(s): Patient and CareEverywhere/SureScripts via in-person    Pertinent Information:   Medication history completed at bedside with patient. Endorses no prescription medications, PRNs, or OTC medications.    Changes made to PTA medication list:  Added: None  Deleted:   Dexamethasone - - patient states not taking, old prescription  Ondansetron - patient states not taking, filled 10/2024  Prochlorperazine - patient states not taking, filled 10/2024  Changed: None    Allergies reviewed with patient and updates made in EHR: yes    Medication History Completed By: Usha Larkin RPH 2/4/2025 8:35 AM    PTA Med List   Medication Sig Last Dose/Taking    lidocaine-prilocaine (EMLA) 2.5-2.5 % external cream Place quarter size amount of cream to port site 30-45 minutes prior to arrival for treatment Taking     Clarisa MichaelD  PGY1 Pharmacy Resident

## 2025-02-04 NOTE — OP NOTE
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    Postoperative diagnosis: The same as preoperative diagnosis    Procedure:   Esophagogastroscopy  Laparoscopic dissection of the hiatus and mediastinal dissection  Laparotomy and LEFT thoracotomy with total gastrectomy, distal esophagectomy and intrathoracic Mode-en-Y esophagojejunostomy  D2 abdominal lymphadenectomy  Jejunostomy feeding tube placement  Cryoablation of LEFT intercostal nerves 5-10    Anesthesia: General    Surgeon: Willy Alvarado (present and participated in the entire procedure)    Co-surgeon: *** (this is a technically demanding operation that requires 2 staff surgeons despite the presence of qualified residents).    Resident surgeon: ***    EBL: ***    Complications: ***    Findings:    Endoscopy:   Anastomosis: Hand-sewn at *** cm from incisors, conduit appeared ***, leak test ***.  Laparoscopy: The dissection was ***.    Patient tolerance: [unfilled] tolerated the procedure ***      We positioned [unfilled] supine and prepared an draped from the chin to the pubis.    ***Laparoscopy and laparotomy    We started by placing 1 port using open technique in the *** and we placed fascial stitches for eventual closure. Next, with laparoscopy we identified other sites for port placement under direct guidance. We started by carefully taking down adhesions to the abdominal wall using a combination of sharp and energy dissection. Once we had freed up all the adhesions to the anterior abdominal wall, we evaluated whether we would be able to continue the procedure laparoscopically. However, the anatomy was not favorable, and we decided to convert to laparotomy. We easily performed a midline incision, and then further took down adhesions in the upper abdomen to be able to clearly dissect the stomach free.     ***Laparotomy  We started with a midline laparotomy and carefully took down adhesions using a combination  of sharp and energy dissection. We then focused on taking down adhesions in the upper abdomen to dissect the stomach free.    ***Open jejunostomy feeding tube placement  We then identified the ligament of Treitz and placed a pursestring on the small bowel about 30-40 cm distal to the ligament. We passed a 16 Fr. FLOYD-J tube through a left abdominal stab incision into the abdominal cavity, made a small opening in the jejunum, placed the tube into it and insufflated the balloon with 3 ml of water. We then tacked the bowel with 4 stitches to the abdominal wall and placed an distal anti-obstruction stitch      We clearly identified the ligament of Treitz and then evaluated the mesenteric vasculature. Third, we identified a spot in proximity of the 3rd jejunal branch where there was an intact arcade and where we could transect 1 or 2 jejunal branches safely for additional length while maintaining intact jejunal perfusion. We transected the bowel with blue staple-loads in the desired spot, and then carefully skeletonized and transected the target jejunal branches between ligatures. The tip of the designated jejunum easily reached the hiatus with additional length to be placed in the chest if necessary. We measured ~60 cm downstream for a jejuno-jejunostomy with the bilio-pancreatic limb, which we fashioned with staplers in a side-to-side fashion. We closed the common enterotomy with *** and closed the mesenteric defect with interrupted silk sutures. We placed the jejunum in an ***antecolic *** retrocolic  position and  jejunum had excellent reach and perfusion.                fashion. We closed the common enterotomy with a stapler and closed the mesenteric defect with interrupted silk sutures. We placed the jejunum in an retrocolic  position and  jejunum had excellent reach and perfusion.  Since the jejunum was retrocolic, we approximated the rim of mesocolon to the jejunum with interrupted silk stitches to prevent internal herniation.    Attempted intraabdominal anastomosis  Next, we identified a spot on the esophagus ~3-4 cm above the z-line with the help of intraoperative endoscopy and partially opened the esophagus. We started placing a 2-0 prolene pursestring and gradually kept transecting the esophagus and continuing our suture until the esophagus was fully transected and the purse-string was complete. During this process, and before complete transection of the esophagus, we placed the anvil into the lumen. Once the esophagus was completely transected, we tightened the pursetring and tied it. We sent the specimen to pathology with the above-mentioned findings. At this time we decided we would have to perform a thoracotomy for an intrathoracic anastomosis.     Jejunostomy feeding tube placement  We then identified the ligament of Treitz and placed a pursestring on the small bowel about 30-40 cm distal to the ligament. We passed a 16 Fr. FLOYD-J tube through a left abdominal stab incision into the abdominal cavity, made a small opening in the jejunum, placed the tube into it and insufflated the balloon with 3 ml of water. We then tacked the bowel with 4 stitches to the abdominal wall and placed an distal anti-obstruction stitch    LEFT thoracotomy and intrathoracic anastomosis  We made an anterolateral LEFT thoracotomy incision in the 7th intercostal space. We exposed the area very clearly and easily identified the esophagus that was already dissected free. We mobilized it further to ensure an easy anastomosis at the level of the inferior pulmonary vein. We identified a spot on the  esophagus that was grossly normal with the aid of intraoperative endoscopy and partially opened the esophagus. We started placing a 2-0 prolene pursestring and gradually kept transecting the esophagus and continuing our suture until the esophagus was fully transected and the purse-string was complete. During this process, and before complete transection of the esophagus, we placed the anvil into the lumen. Once the esophagus was completely transected, we tightened the pursetring and tied it. We sent the specimen to pathology with the above-mentioned findings. We amputated the tip of the Omde limb and placed the EEA stapler through it and perforated a spot on the antimesenteric side of the jejunum for the anastomosis and then performed the anastomosis. There were 2 complete rings of mucosa. Then we closed the enterotomy with a green staple-load. Following this, we performed an endoscopy with insufflation under immersion to verify that there was no leak. Then we passed a wire through the scope and threaded an NG over the wire into the proximal jejunum. Finally, we placed a right angle chest tube that was not in direct contact with the conduit and esophagus, and we performed cryoablation of intercostal nerves 5-10.    Closure  At the end of the procedure, we verified hemostasis, placed 2-3 interrupted silk stitches from the Mode limb serosa to the hiatus and closed the abdominal cavity with 0 looped PDS and skin staples.  We closed the thoracotomy in the standard fashion with #5 pericostal sutures and vicryl sutures for all the soft tissues.

## 2025-02-04 NOTE — PLAN OF CARE
Goal Outcome Evaluation:      Plan of Care Reviewed With: patient    Overall Patient Progress: no change    Major Shift Events  Overnight pt remained in stable condition w/ some issues of pain control. Pain was treated with epidural Dilaudid/Bupivocaine see eMAR for details. Additionally, pt started on scheduled Robaxin IV d/t muscle pain on L-chest (@ CT & Surgical incision sites), w/ good improvement in pain management. Pt continues to require 4L NC overnight r/t shallow breathing d/t pain; incentive spirometry performed and nebulizer ordered by MD.     Neuro: A&Ox4, following commands, PERRLA, MSK: 5/5, sensation intact  Cv: SR/ST ('s), MAP >65, Sys < 160 w/o issue, pulses +2, afebrile (Tmax: 99.7 F)   Resp: 4L NC, shallow breathing w/ ab muscle usage, tension pneumothorax L-side w/ CT (-20 mmHg), LS: coarse/diminished, SpO2 > 92%  GI/: NG LIS, PEG clamped, Strict NPO, BM-, BG WDL, BS +  Skin: L-chest CT, Midsternal surgical incision (primapore), L-flank surgical incision (dermabond), L-laparatomy site (primapore), scattered ecchymosis     Drips: Amiodarone 0.5 mg/min, LR 75 mL/hr  Sedation: None     Plan: Follow POC. Monitor closely, notify MD of acute changes.  For vital signs and complete assessments, please see documentation flowsheets.

## 2025-02-04 NOTE — PLAN OF CARE
Major Shift Events: Alert/lethargic&Oriented x4, abl to make needs known, GALARZA, strength equal, PERRLA. SR, with noted ST elevation, EKG x2 showed possible acute MI/STEMI, patient asymptomatic, troponin WNL, SICU aware and cardiology referral made. 3L NC, LS coarse in AM/clear in afternoon, weak cough, left CT to -20 suction. Begum removed around 1600, no BM, not passing gas, tube feeds started at 1200 at rate of 10, increased to 20 at 1800 with SFF. Transitioned to oral amio in afternoon.  Plan: Continue with current plan of care and notify team of any acute changes.  For vital signs and complete assessments, please see documentation flowsheets.  Goal Outcome Evaluation:      Plan of Care Reviewed With: patient    Overall Patient Progress: improvingOverall Patient Progress: improving    Outcome Evaluation: Pain well managed, out of bed with PT/OT, artline removed

## 2025-02-04 NOTE — CONSULTS
"     Cardiology Inpatient Consultation  Date of Service: 02/04/2025  Patient: Prashant Allen  MRN: [id number]  Admission Date: 2/3/2025  Hospital Day: 1            IMPRESSION     Abnormal EKG  Esophageal junction adenocarcinoma             ASSESSMENT AND PLAN     This is a 35 year old man who was previously healthy but was unfortunately recently found to have esophageal junction adenocarcinoma who was admitted on 2/3/2025 for EGD, laparoscopic dissection of the hiatus and mediastinum, laparotomy and left thoracotomy with total gastrectomy, distal esophagectomy, intrathoracic esophagojejunostomy, abdominal lymphadenectomy, feeding tube placement, left intercostal nerve cryoablation, and left chest tube placement.    Earlier this afternoon, patient was noted to have ST elevation on telemetry. He was asymptomatic at the time. EKG was obtained with computer read of \"ST elevation, consider inferolateral injury or acute infarct\" for which cardiology was consulted.     Review of EKG shows diffuse benign-appearing concave ST elevations without reciprocal changes. High sensitivity troponin was checked x2 and were 14 and 11. The patient has remained hemodynamically stable and asymptomatic from a cardiovascular standpoint. He does not have typical cardiovascular risk factors. EKG changes are most likely secondary to pericardial inflammation following recent intrathoracic procedure. Per primary team, EKG was reviewed with CICU attending who is in agreement that EKG is not consistent with ischemia. As he has remained asymptomatic, there is not an indication to treat at present.    Recommendations:   - No additional workup or management is needed at this time    Plan of care to be discussed with Dr. Antunez.    Thank you for consulting the cardiovascular services at the [facility]. Please do not hesitate to call us with any questions.     Tyron Gomez MD  Cardiovascular Disease Fellow             " HISTORY OF PRESENT ILLNESS     Prashant Allen is a 35 year old male who was previously healthy prior to recent diagnosis of esophageal junction adenocarcinoma who is admitted status post resection procedure (EGD, laparoscopic dissection of the hiatus and mediastinum, laparotomy and left thoracotomy with total gastrectomy, distal esophagectomy, intrathoracic esophagojejunostomy, abdominal lymphadenectomy, feeding tube placement, left intercostal nerve cryoablation, and left chest tube placement).    He was noted to have ST elevation on telemetry earlier today. An EKG was ordered with a machine read of ST elevation for which cardiology is consulted. The patient is hemodynamically stable and denies symptoms at the time of evaluation (and was reportedly asymptomatic throughout the afternoon).     Review of Systems:    Complete review of systems was performed and negative except per HPI.             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)      Active Problems:  Patient Active Problem List    Diagnosis Date Noted    Gastric cancer (H) 2025     Priority: Medium     Social History:  Social History     Tobacco Use    Smoking status: Never    Smokeless tobacco: Never   Vaping Use    Vaping status: Never Used   Substance Use Topics    Alcohol use: Not Currently     Comment: wine liquor not often    Drug use: Never     Family History:  Family History   Problem Relation Age of Onset    Breast Cancer Mother     Unknown/Adopted Father     Anesthesia Reaction No family hx of     Thrombosis No family hx of      Medications:  Current Facility-Administered Medications   Medication Dose Route Frequency Provider Last Rate  Last Admin    acetaminophen (TYLENOL) oral liquid 975 mg  975 mg Oral or Feeding Tube Q8H Willy Alvarado MD        acetylcysteine (MUCOMYST) 20 % nebulizer solution 2 mL  2 mL Nebulization Q4H de Rosa M Wang CNP   2 mL at 02/04/25 1623    albuterol (PROVENTIL) neb solution 2.5 mg  2.5 mg Nebulization Q4H de Rosa M Wang CNP   2.5 mg at 02/04/25 1623    amiodarone (CORDARONE) suspension 400 mg  400 mg Oral BID Willy Alvarado MD        Followed by    [START ON 2/8/2025] amiodarone (CORDARONE) suspension 200 mg  200 mg Oral or FT or NG tube BID Willy Alvarado MD        Followed by    [START ON 2/10/2025] amiodarone (CORDARONE) suspension 200 mg  200 mg Oral or FT or NG tube Daily Willy Alvarado MD        enoxaparin ANTICOAGULANT (LOVENOX) injection 40 mg  40 mg Subcutaneous Q24H Kieran Brooks DO   40 mg at 02/04/25 1156    insulin aspart (NovoLOG) injection (RAPID ACTING)  1-7 Units Subcutaneous Q4H Gomez Bishop PA-C        magnesium sulfate 4 g in 100 mL sterile water intermittent infusion  4 g Intravenous Once Gomez Bishop PA-C 50 mL/hr at 02/04/25 1601 4 g at 02/04/25 1601    methocarbamol (ROBAXIN) injection 1,000 mg  1,000 mg Intravenous Q8H de Rosa M Wang CNP   1,000 mg at 02/04/25 1322    metoprolol (LOPRESSOR) injection 5 mg  5 mg Intravenous Q6H Gomez Bishop PA-C   5 mg at 02/04/25 1602    multivitamins w/minerals liquid 15 mL  15 mL Per Feeding Tube Daily Kieran Brooks DO   15 mL at 02/04/25 1005    [START ON 2/5/2025] pantoprazole (PROTONIX) 2 mg/mL suspension 40 mg  40 mg Per Feeding Tube QAM AC Willy Alvarado MD        polyethylene glycol (MIRALAX) Packet 17 g  17 g Per Feeding Tube Daily Misbah Lawrence MD   17 g at 02/04/25 1005    sennosides (SENOKOT) syrup 5 mL  5 mL Oral or Feeding Tube At Bedtime Willy Alvarado MD         Current Facility-Administered Medications    Medication Dose Route Frequency Provider Last Rate Last Admin    dextrose 10% infusion   Intravenous Continuous PRN Kieran Brooks DO        HYDROmorphone (DILAUDID) 6 mcg/mL, BUPivacaine (MARCAINE) 0.0625 % in sodium chloride 0.9 % 250 mL PIB + PCEA   EPIDURAL PIB Misbah Lawrence MD 7 mL/hr at 02/03/25 1139 Rate Verify at 02/04/25 1650    lactated ringers infusion   Intravenous Continuous Misbah Lawrence MD 75 mL/hr at 02/04/25 1200 New Bag at 02/04/25 1200             PHYSICAL EXAM     Temp:  [98.1  F (36.7  C)-100.9  F (38.3  C)] 99.7  F (37.6  C)  Pulse:  [] 104  Resp:  [16-27] 26  BP: (105-132)/(67-86) 111/76  MAP:  [66 mmHg-104 mmHg] 78 mmHg  Arterial Line BP: ()/(54-97) 110/64  SpO2:  [92 %-100 %] 97 %    Intake/Output Summary (Last 24 hours) at 2/4/2025 1654  Last data filed at 2/4/2025 1600  Gross per 24 hour   Intake 2445.84 ml   Output 2885 ml   Net -439.16 ml     GEN: WDWN, NAD.  CV: Tachycardic, regular rhythm, normal S1, S2. No murmurs. JVP flat. No edema. Symmetric 2+ radial pulses.  Pulm: Clear bilaterally with normal WOB.  Abd: Nondistended. Midline incision.            DIAGNOSTICS     All labs and imaging were reviewed, of note:    CMP  Recent Labs   Lab 02/04/25  1604 02/04/25  1325 02/04/25  0515 02/04/25  0512 02/03/25  1850 02/03/25  1753 02/03/25  1741 02/03/25  1557 02/03/25  1510   NA  --   --   --  135  --  139  --  138 139   POTASSIUM  --   --   --  4.4  --  4.6  --  3.9 3.9   CHLORIDE  --   --   --  103  --  105  --   --   --    CO2  --   --   --  21*  --  21*  --   --   --    ANIONGAP  --   --   --  11  --  13  --   --   --    * 145* 149* 143*   < > 154*   < > 155* 158*   BUN  --   --   --  9.8  --  10.2  --   --   --    CR  --   --   --  0.94  --  0.81  --   --   --    GFRESTIMATED  --   --   --  >90  --  >90  --   --   --    MARILYNN  --   --   --  8.4*  --  8.4*  --   --   --    MAG  --   --   --  1.7  --   --   --   --   --    PHOS  --   --   --  2.8  --   --    --   --   --     < > = values in this interval not displayed.     CBC  Recent Labs   Lab 02/04/25  0512 02/03/25  2119 02/03/25  1753 02/03/25  1557 02/03/25  1510   WBC 13.9*  --  14.0*  --   --    RBC 4.70  --  4.86  --   --    HGB 13.5  --  13.9 12.9* 13.5   HCT 39.0*  --  41.6  --   --    MCV 83  --  86  --   --    MCH 28.7  --  28.6  --   --    MCHC 34.6  --  33.4  --   --    RDW 14.3  --  14.4  --   --     200 192  --   --      INRNo lab results found in last 7 days.  Arterial Blood Gas  Recent Labs   Lab 02/04/25  0513 02/03/25  1557 02/03/25  1510 02/03/25  1427   PH 7.43 7.34* 7.31* 7.39   PCO2 39 44 48* 36   PO2 64* 139* 72* 178*   HCO3 25 24 24 22   O2PER 32 45.0 25.0 41.0     Troponin  Lab Results   Component Value Date    CTROPT 11 02/04/2025    CTROPT 14 02/04/2025

## 2025-02-04 NOTE — PROGRESS NOTES
Admitted/transferred from: PACU  Reason for admission/transfer: Post op Hemodynamic Monitoring  2 RN skin assessment: completed by Arik MYA RN & Dulce MTZ RN  Result of skin assessment and interventions/actions:    - Midline sternal incision (primapore)   - L-Flank surgical incision (Dermabond)   - LUQ PEG Tube   - LLQ Surgical site (Primapore)  Height, weight, drug calc weight: Done  Patient belongings (see Flowsheet)   - [employer] Phone w/    - [employer] Laptop   - [employer] One controller   - Wireless Earbuds   - Wallet (Taken home by Fredrick Cruz)  MDRO education added to care plan  ?

## 2025-02-04 NOTE — PROGRESS NOTES
SURGICAL ICU PROGRESS NOTE  02/04/2025        Date of Service (when I saw the patient): 02/04/2025    ASSESSMENT:   34 YO previously healthy male with Seiwert Type 3 esophageal junction adenocarcinoma with linitis plastica s/p neoadjuvant chemotherapy (last Dec 2024) who was admitted on 2/3/25 for an EGD, laparoscopic dissection of the hiatus and mediastinum, laparotomy and left thoracotomy with total gastrectomy, distal esophagectomy, intrathoracic RXY esophagojejunostomy, D2 abdominal lymphadenectomy, jejunostomy feeding tube placement, cryoablation of LEFT intercostal nerves 5-10, and left chest tube placement. Arrives to the SICU extubated and off of pressors.     CHANGES and MAJOR THINGS TODAY:   - some mild O2 desaturations overnight and difficulty with secretions. Added HFNC, mucomyst, flutter valve, CPT for help with secretions.  - keep in ICU until improved secretion clearance  - start lovenox. Remove de oliveira and a-line      PLAN:    Intra-op totals:   cc  Crystalloid 5940  RBC 0  FFP 0  Plt 0  Cryo 0     PLAN:   Neuro/ pain/ sedation:  # Acute postop pain  -Pain team following  -tylenol and robaxin scheduled  -Dilaudid/bupivicaine epidural (qhour bolus intentionally ordered by anesthesia team)     Pulmonary care:   # S/p left thoracotomy  # Traumatic intraoperative pneumothorax s/p L chest tube placement  -Supplemental oxygen to keep saturation above 92 %.  -Incentive spirometer every 15- 30 minutes when awake.  -Chest tube to suction, no air leak and small PTX on cxr  - HFNC added for desaturation. Mucomyst added, flutter valve for secretions      Cardiovascular:    # Intermittent borderline hypertension, chronic  # Transient afib with RVR intraop  -Monitor hemodynamic status.   ->4 METS and no exertional dyspnea/angina preop  -Goal MAP > 65 (aim for normotension)  -Amiodarone bolus and gtt started in OR. Oral amio taper ordered for after gtt expires               -> Monitor for arrhythmia on tele,  amio continuation per Thoracic     GI care:   # Esophageal adenocarcinoma s/p complex intraabdominal+intrathoracic resection  -NPO  -NGT to LIS (do NOT manipulate)  -J tube in place. Start trophic feeds today      Fluids/ Electrolytes/ Nutrition:   -LR @ 75 mIVF ordered. Leave today  -Nutrition consulted. Appreciate recs     Renal/ Fluid Balance:    -Urine output is adequate so far.  -Will continue to monitor intake and output.      Endocrine:    -No management indication.  -POCT glc checks while NPO  -Hypoglycemia protocol      ID/ Antibiotics:  -No indication for antibiotics.   -S/p periop ancef  -Trend CBC      Heme:     - cc  -Postop Hgb 14  -Plts stable      Prophylaxis:    -Mechanical prophylaxis for DVT.  - start lovenox for dvt ppx      MSK:    -PT and OT consulted. Appreciate recs.      Lines/ tubes/ drains:  -L chest portacath  -R radial mayra (remove)  -PIVx2  -NG  -J tube  -Begum (remove)      Disposition:  -Surgical ICU      Time spent on this Encounter   Billing:  I spent 40 minutes bedside and on the inpatient unit today managing the critical care of Prashant Allen, excluding procedures in relation to the issues listed in this note.      Gomez Bishop PA-C    ====================================  INTERVAL HISTORY:   Subjective:  States pain well controlled    OBJECTIVE:   1. VITAL SIGNS:   Temp:  [98.1  F (36.7  C)-100.9  F (38.3  C)] 100.5  F (38.1  C)  Pulse:  [] 101  Resp:  [16-27] 22  BP: (116-132)/(77-86) 132/86  MAP:  [66 mmHg-104 mmHg] 79 mmHg  Arterial Line BP: ()/(54-97) 114/66  SpO2:  [92 %-100 %] 96 %  Resp: 22    2. INTAKE/ OUTPUT:   I/O last 3 completed shifts:  In: 7833.31 [P.O.:720; I.V.:7053.31; NG/GT:60]  Out: 3066 [Urine:2205; Emesis/NG output:240; Blood:500; Chest Tube:121]    3. PHYSICAL EXAMINATION:  Gen: awake/alert, comfortable appearing  Neuro: Alert and oriented. GALARZA to command  Pulm: no WOB. Can hear secretions with cough. CTA b/l  CV: RRR by  telemetry  ABD:soft, appropriately tender, nondistended, midline incision dressing CDI  MSK:  Normal active range of motion, no edema  Skin: Warm, dry, no rashes or abrasions on exposed skin  Psych: Normal affect, cooperative

## 2025-02-04 NOTE — H&P
SURGICAL ICU ADMISSION NOTE  2/3/2025    PRIMARY TEAM: Thoracic Surgery   PRIMARY PHYSICIAN: Dr. Alvarado    REASON FOR CRITICAL CARE ADMISSION: Hemodynamic monitoring  CONSULTING PHYSICIAN: Dr. 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 Arrives to the SICU extubated and off of pressors.     Intra-op totals:   cc  Crystalloid 5940  RBC 0  FFP 0  Plt 0  Cryo 0    PLAN:   Neuro/ pain/ sedation:  # Acute postop pain  -Monitor neurological status. Notify the MD for any acute changes in exam.  -Pain team following  -S tylenol available for pain.  -Dilaudid/bupivicaine epidural   -Nalbuphine available     Pulmonary care:   # S/p left thoracotomy  # Traumatic intraoperative pneumothorax s/p L chest tube placement  -Supplemental oxygen to keep saturation above 92 %.  -Incentive spirometer every 15- 30 minutes when awake.  -Chest tube to suction, monitor for air leak (present on arrival, since resolved)     Cardiovascular:    # Intermittent borderline hypertension, chronic  # Transient afib with RVR intraop  -Monitor hemodynamic status.   ->4 METS and no exertional dyspnea/angina preop  -Goal MAP > 65 (aim for normotension)  -Amiodarone bolus and gtt started in OR   -> Monitor for arrhythmia on tele, amio continuation per Thoracic     GI care:   # Esophageal adenocarcinoma s/p complex intraabdominal+intrathoracic resection  -NPO  -NGT to LIS (do NOT manipulate)  -J tube clamped for now, pending use approval by Thoracic   -> rechecking [place]  tonight     Fluids/ Electrolytes/ Nutrition:   -LR @ 75 mIVF ordered  -Nutrition consulted. Appreciate recs     Renal/ Fluid Balance:    -Urine output is adequate so far.  -Will continue to monitor intake and output.  -Begum while epidural in place     Endocrine:    -No management indication.  -POCT glc checks while NPO  -Hypoglycemia protocol     ID/ Antibiotics:  -No indication for antibiotics.   -S/p periop ancef  -Trend CBC     Heme:     - cc  -Postop Hgb 14  -Plts stable     Prophylaxis:    -Mechanical prophylaxis for DVT.  -SQH to start POD1     MSK:    -PT and OT consulted. Appreciate recs.     Lines/ tubes/ drains:  -L chest portacath  -R radial mayra  -PIVx2  -NG  -J tube  -Begum     Disposition:  -Surgical ICU    Patient seen, findings and plan discussed with surgical ICU staff.    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 Arrives to the SICU extubated and off of pressors.    On arrival, states pain is barely manageable. Pain is sharp and worse with movement. No nausea. No home oxygen requirements.     REVIEW OF SYSTEMS: 10 point ROS neg other than the symptoms noted above in the HPI.    PAST MEDICAL HISTORY:    has a past medical history of Prediabetes and Siewert type III adenocarcinoma of  esophagogastric junction (H).    SURGICAL HISTORY:    has a past surgical history that includes Laparoscopy diagnostic (general) (N/A, 09/30/2024); Esophagoscopy, gastroscopy, duodenoscopy (EGD), combined (N/A, 09/30/2024); and upper gi endoscopy.    SOCIAL HISTORY:    reports that he has never smoked. He has never used smokeless tobacco. He reports that he does not currently use alcohol. He reports that he does not use drugs.    FAMILY HISTORY: No bleeding/clotting disorders nor problems with anesthesia.    ALLERGIES:    No Known Allergies    MEDICATIONS:  Current Facility-Administered Medications   Medication Dose Route Frequency Provider Last Rate Last Admin    acetaminophen (TYLENOL) tablet 975 mg  975 mg Per J Tube Q8H Misbah Lawrence MD        amiodarone (NEXTERONE) 1.8 mg/mL in dextrose 5% in 500 mL ADULT STANDARD infusion  1 mg/min Intravenous Continuous Misbah Lawrence MD 33.33 mL/hr at 02/03/25 1934 1 mg/min at 02/03/25 1934    Followed by    [START ON 2/4/2025] amiodarone (NEXTERONE) 1.8 mg/mL in dextrose 5% in 500 mL ADULT STANDARD infusion  0.5 mg/min Intravenous Continuous Misbah Lawrence MD        [START ON 2/4/2025] heparin ANTICOAGULANT injection 5,000 Units  5,000 Units Subcutaneous Q8H Willy Alvarado MD        HYDROmorphone (DILAUDID) 6 mcg/mL, BUPivacaine (MARCAINE) 0.0625 % in sodium chloride 0.9 % 250 mL PIB + PCEA   EPIDURAL PIB Misbah Lawrence MD 7 mL/hr at 02/03/25 1139 7 mL/hr at 02/03/25 1139    lactated ringers infusion   Intravenous Continuous Misbah Lawrence MD        nalbuphine (NUBAIN) injection 2.5-5 mg  2.5-5 mg Intravenous Q6H PRN Misbah Lawrence MD        naloxone (NARCAN) injection 0.2 mg  0.2 mg Intravenous Q2 Min PRN Willy Alvarado MD        Or    naloxone (NARCAN) injection 0.4 mg  0.4 mg Intravenous Q2 Min PRN Willy Alvarado MD        Or    naloxone (NARCAN) injection 0.2 mg  0.2 mg Intramuscular Q2 Min PRN Willy Alvarado  MD Jax        Or    naloxone (NARCAN) injection 0.4 mg  0.4 mg Intramuscular Q2 Min PRN Willy Alvarado MD        ondansetron (ZOFRAN ODT) ODT tab 4 mg  4 mg Oral Q6H PRN Misbah Lawrence MD        Or    ondansetron (ZOFRAN) injection 4 mg  4 mg Intravenous Q6H PRN Misbah Lawrence MD        pantoprazole (PROTONIX) IV push injection 40 mg  40 mg Intravenous Daily Misbah Lawrence MD        prochlorperazine (COMPAZINE) injection 10 mg  10 mg Intravenous Q6H PRN Misbah Lawrence MD        Or    prochlorperazine (COMPAZINE) tablet 10 mg  10 mg Oral Q6H PRN Misbah Lawrence MD           PHYSICAL EXAMINATION:  Temp:  [98  F (36.7  C)-98.1  F (36.7  C)] 98.1  F (36.7  C)  Pulse:  [63-95] 91  Resp:  [12-19] 17  BP: (116-147)/(77-96) 123/83  MAP:  [80 mmHg-104 mmHg] 80 mmHg  Arterial Line BP: (131-143)/(69-97) 142/72  SpO2:  [96 %-100 %] 99 %      Gen: awake/alert, uncomfortable appearing/rigid  HEENT: Atraumatic, normocephalic  Neuro: Alert and oriented. No gross neurologic deficits  Pulm: tachypneic, intermittent cough, L chest tube to suction with sporadic air leak  CV: RRR by telemetry  ABD:soft, appropriately tender, nondistended, midline incision dressing CDI  MSK:  Normal active range of motion, no edema  Skin: Warm, dry, no rashes or abrasions on exposed skin  Psych: Normal affect, cooperative      LABS: Reviewed.   Arterial Blood Gases   Recent Labs   Lab 02/03/25  1557 02/03/25  1510 02/03/25  1427 02/03/25  1148   PH 7.34* 7.31* 7.39 7.35   PCO2 44 48* 36 41   PO2 139* 72* 178* 121*   HCO3 24 24 22 23     Complete Blood Count   Recent Labs   Lab 02/03/25  1753 02/03/25  1557 02/03/25  1510 02/03/25  1427   WBC 14.0*  --   --   --    HGB 13.9 12.9* 13.5 13.8     --   --   --      Basic Metabolic Panel  Recent Labs   Lab 02/03/25  1850 02/03/25  1753 02/03/25  1741 02/03/25  1557 02/03/25  1510 02/03/25  1427   NA  --  139  --  138 139 139   POTASSIUM  --  4.6  --  3.9 3.9 3.9  "  CHLORIDE  --  105  --   --   --   --    CO2  --  21*  --   --   --   --    BUN  --  10.2  --   --   --   --    CR  --  0.81  --   --   --   --    * 154* 146* 155* 158* 166*     Liver Function Tests  No lab results found in last 7 days.  Pancreatic Enzymes  No lab results found in last 7 days.  Coagulation Profile  No lab results found in last 7 days.  Lactate  Invalid input(s): \"LACTATE\"    IMAGING:  Recent Results (from the past 24 hours)   XR Chest Port 1 View    Impression    RESIDENT PRELIMINARY INTERPRETATION  Impression:   1. Large left pneumothorax with left basilar chest tube. Possible  mediastinal shift may be concerning for tension physiology.  2. Atelectasis of the left lung and moderate pulmonary edema  throughout the right lung.  3. Enteric tube projects over the intrathoracic Mode-en-Y.    [Critical Result: Tension pneumothorax]    Finding was identified on 2/3/2025 6:35 PM.     J CARLOS Craft was contacted by Dr. Rueda at 2/3/2025 6:38 PM and  verbalized understanding of the critical finding.  I called   XR Abdomen Port 1 View    Impression    RESIDENT PRELIMINARY INTERPRETATION  IMPRESSION:  New enteric tube projects over the left hemiabdomen.   XR Chest Port 1 View    Impression    RESIDENT PRELIMINARY INTERPRETATION  Impression:   1.  Small to moderate left pneumothorax, decreased compared to prior.  2.  Stable perihilar and bibasilar streaky opacities, which may  represent pulmonary edema versus atelectasis.  3.  Left lower lobar atelectasis.       "

## 2025-02-04 NOTE — PROGRESS NOTES
"   02/04/25 1038   Appointment Info   Signing Clinician's Name / Credentials (PT) Monica Guzman, PT, DPT   Rehab Comments (PT) L thora, protective abd prec, SBP < 160, CT to suction, NGT to LIS   Living Environment   People in Home sibling(s)   Current Living Arrangements house   Home Accessibility stairs to enter home;stairs within home   Number of Stairs, Main Entrance 3   Stair Railings, Main Entrance railings safe and in good condition  (unilateral)   Number of Stairs, Within Home, Primary greater than 10 stairs   Stair Railings, Within Home, Primary railings safe and in good condition  (unilateral)   Transportation Anticipated family or friend will provide   Living Environment Comments Pt reports he lives with his brother in a multi-level home, ~ 3 [address] with unilateral railings, one flight to basement w unilateral railings where he has bedroom, bathroom, and small kitchen. Pt has WIS with small threshold to enter.   Self-Care   Usual Activity Tolerance excellent   Current Activity Tolerance moderate   Regular Exercise No   Equipment Currently Used at Home none   Fall history within last six months no   Activity/Exercise/Self-Care Comment Pt reports he is IND with all mobility and I/ADLs at baseline, works full time but will be taking off at least 4 weeks. Work typically requires heavy lifting but he is able to deligate tasks that require heavy exertion/lifting if needed. Pt denies regular exercise, enjoys reading and playing videogames in his spare time.   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 Arrives to the SICU extubated and off of pressors.\"   Existing Precautions/Restrictions abdominal;fall;thoracotomy   General Observations Activity: Ambulate with assist   Cognition   Affect/Mental Status (Cognition) WNL   Orientation Status (Cognition) oriented x 4   Follows Commands (Cognition) WNL   Cognitive Status Comments Glasses for vision   Pain Assessment   Patient Currently in Pain Yes, see Vital Sign flowsheet  (3/10 pre and post activity)   Integumentary/Edema   Integumentary/Edema other (describe)   Integumentary/Edema Comments trace BLE edema   Posture    Posture Forward head position;Protracted shoulders   Range of Motion (ROM)   Range of Motion ROM is WFL   ROM Comment BLE ROM grossly WFL per functional mobility   Strength (Manual Muscle Testing)   Strength Comments BLE strength at least > 3+/5 per functional mobility; generalized weakness and deconditioning post-op   Bed Mobility   Comment, (Bed Mobility) minAx2 supine > sit   Transfers   Comment, (Transfers) Heidi STS from EOB   Gait/Stairs (Locomotion)   Comment, (Gait/Stairs) Pt amb ~ 5ft with HHA/minAx2 for steadying, guarded posture with small shuffled steps   Balance   Balance other (describe)   Balance Comments sitting balance grossly WFL, guarded posture; pt able to stand statically without UE support, Ax1 for dynamic standing balance and gait   Sensory Examination   Sensory Perception patient reports no sensory changes   Clinical Impression   Criteria for Skilled Therapeutic Intervention Yes, treatment indicated   PT Diagnosis (PT) impaired functional mobility   Influenced by the following impairments post-op " pain and precautions, strength, endurance, balance   Functional limitations due to impairments decreased IND with bed mobility, transfers, gait, and stairs; decreased activity tolerance   Clinical Presentation (PT Evaluation Complexity) stable   Clinical Presentation Rationale clinical judgement   Clinical Decision Making (Complexity) low complexity   Planned Therapy Interventions (PT) balance training;bed mobility training;gait training;home exercise program;neuromuscular re-education;patient/family education;stair training;ROM (range of motion);strengthening;stretching;transfer training;progressive activity/exercise;home program guidelines   Risk & Benefits of therapy have been explained evaluation/treatment results reviewed;care plan/treatment goals reviewed;risks/benefits reviewed;current/potential barriers reviewed;participants voiced agreement with care plan;participants included;patient   PT Total Evaluation Time   PT Eval, Low Complexity Minutes (35899) 6   Physical Therapy Goals   PT Frequency 5x/week  (decrease as appropriate once ambulatory)   PT Predicted Duration/Target Date for Goal Attainment 02/25/25   PT Goals Bed Mobility;Transfers;Gait;Stairs   PT: Bed Mobility Independent;Supine to/from sit;Rolling;Within precautions   PT: Transfers Independent;Sit to/from stand;Bed to/from chair;Within precautions   PT: Gait Independent;150 feet   PT: Stairs Independent;Greater than 10 stairs  (unilateral railing per home set-up)   PT Discharge Planning   PT Plan bring portable suction if CT or NGT still need ot remain on suction, initiate gait and stair training, standing strengthening, IND with bed  mobility and transfers   PT Discharge Recommendation (DC Rec) home with assist   PT Rationale for DC Rec Pt presents below baseline, limited primarily by post-op pain and precautions but overall mobilizing well. Anticipate continued progress over LOS with pt likely to progress to level of IND appropriate to return  home with prn family assist for higher level I/ADLs.   PT Brief overview of current status Ax1   PT Total Distance Amb During Session (feet) 5

## 2025-02-05 ENCOUNTER — APPOINTMENT (OUTPATIENT)
Dept: GENERAL RADIOLOGY | Facility: CLINIC | Age: 35
End: 2025-02-05
Attending: STUDENT IN AN ORGANIZED HEALTH CARE EDUCATION/TRAINING PROGRAM
Payer: COMMERCIAL

## 2025-02-05 ENCOUNTER — APPOINTMENT (OUTPATIENT)
Dept: PHYSICAL THERAPY | Facility: CLINIC | Age: 35
End: 2025-02-05
Attending: THORACIC SURGERY (CARDIOTHORACIC VASCULAR SURGERY)
Payer: COMMERCIAL

## 2025-02-05 LAB
ANION GAP SERPL CALCULATED.3IONS-SCNC: 7 MMOL/L (ref 7–15)
BUN SERPL-MCNC: 10.6 MG/DL (ref 6–20)
CALCIUM SERPL-MCNC: 8.1 MG/DL (ref 8.8–10.4)
CHLORIDE SERPL-SCNC: 103 MMOL/L (ref 98–107)
CREAT SERPL-MCNC: 0.91 MG/DL (ref 0.67–1.17)
EGFRCR SERPLBLD CKD-EPI 2021: >90 ML/MIN/1.73M2
ERYTHROCYTE [DISTWIDTH] IN BLOOD BY AUTOMATED COUNT: 14.5 % (ref 10–15)
GLUCOSE BLDC GLUCOMTR-MCNC: 118 MG/DL (ref 70–99)
GLUCOSE BLDC GLUCOMTR-MCNC: 126 MG/DL (ref 70–99)
GLUCOSE BLDC GLUCOMTR-MCNC: 132 MG/DL (ref 70–99)
GLUCOSE BLDC GLUCOMTR-MCNC: 132 MG/DL (ref 70–99)
GLUCOSE BLDC GLUCOMTR-MCNC: 142 MG/DL (ref 70–99)
GLUCOSE BLDC GLUCOMTR-MCNC: 148 MG/DL (ref 70–99)
GLUCOSE SERPL-MCNC: 144 MG/DL (ref 70–99)
HCO3 SERPL-SCNC: 25 MMOL/L (ref 22–29)
HCT VFR BLD AUTO: 34.4 % (ref 40–53)
HCV RNA SERPL NAA+PROBE-ACNC: NOT DETECTED IU/ML
HGB BLD-MCNC: 11.6 G/DL (ref 13.3–17.7)
MAGNESIUM SERPL-MCNC: 2.2 MG/DL (ref 1.7–2.3)
MCH RBC QN AUTO: 28.6 PG (ref 26.5–33)
MCHC RBC AUTO-ENTMCNC: 33.7 G/DL (ref 31.5–36.5)
MCV RBC AUTO: 85 FL (ref 78–100)
PHOSPHATE SERPL-MCNC: 1.4 MG/DL (ref 2.5–4.5)
PHOSPHATE SERPL-MCNC: 2.1 MG/DL (ref 2.5–4.5)
PLATELET # BLD AUTO: 156 10E3/UL (ref 150–450)
POTASSIUM SERPL-SCNC: 4.4 MMOL/L (ref 3.4–5.3)
RBC # BLD AUTO: 4.06 10E6/UL (ref 4.4–5.9)
SODIUM SERPL-SCNC: 135 MMOL/L (ref 135–145)
WBC # BLD AUTO: 13.1 10E3/UL (ref 4–11)

## 2025-02-05 PROCEDURE — 80048 BASIC METABOLIC PNL TOTAL CA: CPT | Performed by: STUDENT IN AN ORGANIZED HEALTH CARE EDUCATION/TRAINING PROGRAM

## 2025-02-05 PROCEDURE — 999N000157 HC STATISTIC RCP TIME EA 10 MIN

## 2025-02-05 PROCEDURE — 250N000013 HC RX MED GY IP 250 OP 250 PS 637: Performed by: STUDENT IN AN ORGANIZED HEALTH CARE EDUCATION/TRAINING PROGRAM

## 2025-02-05 PROCEDURE — 250N000013 HC RX MED GY IP 250 OP 250 PS 637: Performed by: THORACIC SURGERY (CARDIOTHORACIC VASCULAR SURGERY)

## 2025-02-05 PROCEDURE — 83735 ASSAY OF MAGNESIUM: CPT | Performed by: STUDENT IN AN ORGANIZED HEALTH CARE EDUCATION/TRAINING PROGRAM

## 2025-02-05 PROCEDURE — 97116 GAIT TRAINING THERAPY: CPT | Mod: GP

## 2025-02-05 PROCEDURE — 94640 AIRWAY INHALATION TREATMENT: CPT

## 2025-02-05 PROCEDURE — 120N000005 HC R&B MS OVERFLOW UMMC

## 2025-02-05 PROCEDURE — 71045 X-RAY EXAM CHEST 1 VIEW: CPT

## 2025-02-05 PROCEDURE — 250N000011 HC RX IP 250 OP 636: Performed by: SURGERY

## 2025-02-05 PROCEDURE — 84100 ASSAY OF PHOSPHORUS: CPT | Performed by: STUDENT IN AN ORGANIZED HEALTH CARE EDUCATION/TRAINING PROGRAM

## 2025-02-05 PROCEDURE — 250N000011 HC RX IP 250 OP 636: Performed by: NURSE PRACTITIONER

## 2025-02-05 PROCEDURE — 250N000011 HC RX IP 250 OP 636

## 2025-02-05 PROCEDURE — 94799 UNLISTED PULMONARY SVC/PX: CPT

## 2025-02-05 PROCEDURE — 71045 X-RAY EXAM CHEST 1 VIEW: CPT | Mod: 26 | Performed by: RADIOLOGY

## 2025-02-05 PROCEDURE — 999N000156 HC STATISTIC RCP CONSULT EA 30 MIN

## 2025-02-05 PROCEDURE — 250N000009 HC RX 250: Performed by: THORACIC SURGERY (CARDIOTHORACIC VASCULAR SURGERY)

## 2025-02-05 PROCEDURE — 99207 PR NO BILLABLE SERVICE THIS VISIT: CPT | Mod: 24

## 2025-02-05 PROCEDURE — 94640 AIRWAY INHALATION TREATMENT: CPT | Mod: 76

## 2025-02-05 PROCEDURE — 84100 ASSAY OF PHOSPHORUS: CPT | Performed by: THORACIC SURGERY (CARDIOTHORACIC VASCULAR SURGERY)

## 2025-02-05 PROCEDURE — 97530 THERAPEUTIC ACTIVITIES: CPT | Mod: GP

## 2025-02-05 PROCEDURE — 250N000009 HC RX 250

## 2025-02-05 PROCEDURE — 250N000013 HC RX MED GY IP 250 OP 250 PS 637: Performed by: SURGERY

## 2025-02-05 PROCEDURE — 250N000009 HC RX 250: Performed by: NURSE PRACTITIONER

## 2025-02-05 PROCEDURE — 99231 SBSQ HOSP IP/OBS SF/LOW 25: CPT | Mod: 24

## 2025-02-05 PROCEDURE — 85014 HEMATOCRIT: CPT | Performed by: STUDENT IN AN ORGANIZED HEALTH CARE EDUCATION/TRAINING PROGRAM

## 2025-02-05 PROCEDURE — 82374 ASSAY BLOOD CARBON DIOXIDE: CPT | Performed by: STUDENT IN AN ORGANIZED HEALTH CARE EDUCATION/TRAINING PROGRAM

## 2025-02-05 PROCEDURE — 258N000003 HC RX IP 258 OP 636: Performed by: THORACIC SURGERY (CARDIOTHORACIC VASCULAR SURGERY)

## 2025-02-05 RX ORDER — ALBUTEROL SULFATE 0.83 MG/ML
2.5 SOLUTION RESPIRATORY (INHALATION)
Status: DISCONTINUED | OUTPATIENT
Start: 2025-02-06 | End: 2025-02-11 | Stop reason: HOSPADM

## 2025-02-05 RX ORDER — FAMOTIDINE 40 MG/5ML
20 POWDER, FOR SUSPENSION ORAL DAILY
Status: DISCONTINUED | OUTPATIENT
Start: 2025-02-05 | End: 2025-02-06

## 2025-02-05 RX ORDER — ACETYLCYSTEINE 200 MG/ML
2 SOLUTION ORAL; RESPIRATORY (INHALATION)
Status: DISCONTINUED | OUTPATIENT
Start: 2025-02-06 | End: 2025-02-11 | Stop reason: HOSPADM

## 2025-02-05 RX ORDER — MAGNESIUM SULFATE HEPTAHYDRATE 40 MG/ML
4 INJECTION, SOLUTION INTRAVENOUS ONCE
Status: COMPLETED | OUTPATIENT
Start: 2025-02-05 | End: 2025-02-05

## 2025-02-05 RX ADMIN — POTASSIUM & SODIUM PHOSPHATES POWDER PACK 280-160-250 MG 2 PACKET: 280-160-250 PACK at 20:31

## 2025-02-05 RX ADMIN — ENOXAPARIN SODIUM 40 MG: 40 INJECTION SUBCUTANEOUS at 11:12

## 2025-02-05 RX ADMIN — SODIUM PHOSPHATE, MONOBASIC, MONOHYDRATE AND SODIUM PHOSPHATE, DIBASIC ANHYDROUS 15 MMOL: 142; 276 INJECTION, SOLUTION INTRAVENOUS at 06:05

## 2025-02-05 RX ADMIN — METHOCARBAMOL 1000 MG: 100 INJECTION, SOLUTION INTRAMUSCULAR; INTRAVENOUS at 13:50

## 2025-02-05 RX ADMIN — ALBUTEROL SULFATE 2.5 MG: 2.5 SOLUTION RESPIRATORY (INHALATION) at 16:13

## 2025-02-05 RX ADMIN — ACETYLCYSTEINE 2 ML: 200 SOLUTION ORAL; RESPIRATORY (INHALATION) at 16:13

## 2025-02-05 RX ADMIN — Medication 15 ML: at 08:56

## 2025-02-05 RX ADMIN — ALBUTEROL SULFATE 2.5 MG: 2.5 SOLUTION RESPIRATORY (INHALATION) at 08:36

## 2025-02-05 RX ADMIN — ACETYLCYSTEINE 2 ML: 200 SOLUTION ORAL; RESPIRATORY (INHALATION) at 04:42

## 2025-02-05 RX ADMIN — POLYETHYLENE GLYCOL 3350 17 G: 17 POWDER, FOR SOLUTION ORAL at 08:56

## 2025-02-05 RX ADMIN — ACETYLCYSTEINE 2 ML: 200 SOLUTION ORAL; RESPIRATORY (INHALATION) at 00:32

## 2025-02-05 RX ADMIN — ALBUTEROL SULFATE 2.5 MG: 2.5 SOLUTION RESPIRATORY (INHALATION) at 20:36

## 2025-02-05 RX ADMIN — ALBUTEROL SULFATE 2.5 MG: 2.5 SOLUTION RESPIRATORY (INHALATION) at 12:49

## 2025-02-05 RX ADMIN — ALBUTEROL SULFATE 2.5 MG: 2.5 SOLUTION RESPIRATORY (INHALATION) at 00:32

## 2025-02-05 RX ADMIN — ACETYLCYSTEINE 2 ML: 200 SOLUTION ORAL; RESPIRATORY (INHALATION) at 08:36

## 2025-02-05 RX ADMIN — ACETAMINOPHEN 975 MG: 160 SOLUTION ORAL at 18:18

## 2025-02-05 RX ADMIN — SENNOSIDES 5 ML: 8.8 LIQUID ORAL at 23:29

## 2025-02-05 RX ADMIN — ACETAMINOPHEN 975 MG: 160 SOLUTION ORAL at 11:12

## 2025-02-05 RX ADMIN — INSULIN ASPART 1 UNITS: 100 INJECTION, SOLUTION INTRAVENOUS; SUBCUTANEOUS at 04:06

## 2025-02-05 RX ADMIN — POTASSIUM & SODIUM PHOSPHATES POWDER PACK 280-160-250 MG 2 PACKET: 280-160-250 PACK at 16:12

## 2025-02-05 RX ADMIN — ACETAMINOPHEN 975 MG: 160 SOLUTION ORAL at 01:20

## 2025-02-05 RX ADMIN — ACETYLCYSTEINE 2 ML: 200 SOLUTION ORAL; RESPIRATORY (INHALATION) at 20:36

## 2025-02-05 RX ADMIN — ACETYLCYSTEINE 2 ML: 200 SOLUTION ORAL; RESPIRATORY (INHALATION) at 12:49

## 2025-02-05 RX ADMIN — AMIODARONE HYDROCHLORIDE 400 MG: 200 TABLET ORAL at 20:31

## 2025-02-05 RX ADMIN — POTASSIUM & SODIUM PHOSPHATES POWDER PACK 280-160-250 MG 1 PACKET: 280-160-250 PACK at 08:56

## 2025-02-05 RX ADMIN — ALBUTEROL SULFATE 2.5 MG: 2.5 SOLUTION RESPIRATORY (INHALATION) at 04:42

## 2025-02-05 RX ADMIN — Medication 40 MG: at 08:55

## 2025-02-05 RX ADMIN — MAGNESIUM SULFATE IN WATER 4 G: 40 INJECTION, SOLUTION INTRAVENOUS at 09:35

## 2025-02-05 RX ADMIN — POTASSIUM & SODIUM PHOSPHATES POWDER PACK 280-160-250 MG 2 PACKET: 280-160-250 PACK at 11:12

## 2025-02-05 RX ADMIN — METHOCARBAMOL 1000 MG: 100 INJECTION, SOLUTION INTRAMUSCULAR; INTRAVENOUS at 04:09

## 2025-02-05 RX ADMIN — SODIUM PHOSPHATE, MONOBASIC, MONOHYDRATE AND SODIUM PHOSPHATE, DIBASIC ANHYDROUS 15 MMOL: 142; 276 INJECTION, SOLUTION INTRAVENOUS at 16:38

## 2025-02-05 RX ADMIN — INSULIN ASPART 1 UNITS: 100 INJECTION, SOLUTION INTRAVENOUS; SUBCUTANEOUS at 11:31

## 2025-02-05 RX ADMIN — FAMOTIDINE 20 MG: 40 POWDER, FOR SUSPENSION ORAL at 11:12

## 2025-02-05 RX ADMIN — METOPROLOL TARTRATE 5 MG: 5 INJECTION INTRAVENOUS at 04:09

## 2025-02-05 RX ADMIN — SODIUM PHOSPHATE, MONOBASIC, MONOHYDRATE AND SODIUM PHOSPHATE, DIBASIC ANHYDROUS 15 MMOL: 142; 276 INJECTION, SOLUTION INTRAVENOUS at 09:31

## 2025-02-05 RX ADMIN — AMIODARONE HYDROCHLORIDE 400 MG: 200 TABLET ORAL at 08:55

## 2025-02-05 ASSESSMENT — ACTIVITIES OF DAILY LIVING (ADL)
ADLS_ACUITY_SCORE: 46
ADLS_ACUITY_SCORE: 45
ADLS_ACUITY_SCORE: 46
ADLS_ACUITY_SCORE: 41
ADLS_ACUITY_SCORE: 41
ADLS_ACUITY_SCORE: 45
ADLS_ACUITY_SCORE: 41
ADLS_ACUITY_SCORE: 46
ADLS_ACUITY_SCORE: 41
ADLS_ACUITY_SCORE: 46
ADLS_ACUITY_SCORE: 45
ADLS_ACUITY_SCORE: 41
ADLS_ACUITY_SCORE: 45
ADLS_ACUITY_SCORE: 41
ADLS_ACUITY_SCORE: 41
ADLS_ACUITY_SCORE: 46
ADLS_ACUITY_SCORE: 46
ADLS_ACUITY_SCORE: 41
ADLS_ACUITY_SCORE: 46
ADLS_ACUITY_SCORE: 45
ADLS_ACUITY_SCORE: 41

## 2025-02-05 NOTE — PROGRESS NOTES
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 Arrives to the SICU extubated and off of pressors.     CHANGES and MAJOR THINGS TODAY:   - continues SR. stop metoprolol, continue amiodarone (afib prophylaxis)  - transfer to floor  - stop LR continuous  - discontinue ppi, start famotidine      PLAN:    Intra-op totals:   cc  Crystalloid 5940  RBC 0  FFP 0  Plt 0  Cryo 0     PLAN:   Neuro/ pain/ sedation:  # Acute postop pain  -Pain team following  -tylenol and robaxin scheduled  -Dilaudid/bupivicaine epidural (qhour bolus intentionally ordered by anesthesia team)     Pulmonary care:   # S/p left thoracotomy  # Traumatic intraoperative pneumothorax s/p L chest tube placement  -Supplemental oxygen to keep saturation above 92 %.  -Incentive spirometer every 15- 30 minutes when awake.  -Chest tube to waterseal, no air leak and small PTX on cxr. 430 output  - 3L NC. Mucomyst added, flutter valve for secretions      Cardiovascular:    # Intermittent borderline hypertension, chronic  # Transient afib with RVR intraop  -Monitor hemodynamic status.   ->4 METS and no exertional dyspnea/angina preop  -Goal MAP > 65 (aim for normotension)  -Amiodarone for intra-op afib. No afib since     GI care:   # Esophageal adenocarcinoma s/p complex intraabdominal+intrathoracic resection  -NPO  -NGT to LIS (do NOT manipulate)  -J tube in place. Continue trophic feeds       Fluids/ Electrolytes/ Nutrition:   -discontinue LR. Reeval later in day for possible diuresis. Goal even to -1L  -Nutrition consulted. Appreciate recs     Renal/ Fluid Balance:    -normal creatinine      Endocrine:    -SSI      ID/ Antibiotics:  -No indication for antibiotics.   -S/p periop ancef  -Trend CBC      Heme:     - cc  -Postop Hgb 14  -Plts stable      Prophylaxis:    -Mechanical prophylaxis for DVT.  -lovenox for dvt ppx      MSK:    -PT and OT consulted. Appreciate recs.      Lines/ tubes/ drains:  -L chest portacath  -PIVx2  -NG  -J tube      Disposition:  -Surgical ICU      Time spent on this Encounter   Billing:  I spent 40 minutes bedside and on the inpatient unit today managing the care of Prashant Allen, excluding procedures in relation to the issues listed in this note.      Gomez Bishop PA-C    ====================================  INTERVAL HISTORY:   Subjective:  States pain well controlled    OBJECTIVE:   1. VITAL SIGNS:   Temp:  [98  F (36.7  C)-99.7  F (37.6  C)] 98.1  F (36.7  C)  Pulse:  [] 86  Resp:  [18-27] 22  BP: ()/(55-84) 101/58  MAP:  [78 mmHg] 78 mmHg  Arterial Line BP: (110)/(64) 110/64  SpO2:  [92 %-100 %] 97 %  Resp: 22    2. INTAKE/ OUTPUT:   I/O last 3 completed shifts:  In: 3033.36 [I.V.:2213.36; NG/GT:510]  Out: 2884 [Urine:1955; Emesis/NG output:500; Chest Tube:429]    3. PHYSICAL EXAMINATION:  Gen: awake/alert, comfortable appearing  Neuro: Alert and oriented. GALARZA to command  Pulm: no WOB. NC.  CTA b/l. Chest tube in place L side to suction  CV: RRR by telemetry  ABD:soft, appropriately tender, nondistended, midline incision dressing CDI  MSK:  Normal active range of motion, no edema  Skin: Warm, dry, no rashes or abrasions on exposed skin  Psych: Normal affect, cooperative        ICU Daily Rounding Checklist     Checklist Response Notes   Can sedation be reduced?  NA    Can analgesia be reduced? No    Is delirium being assessed, addressed and  prevented? Yes    Spontaneous awakening trial and/or Spontaneous breathing trial candidate?  NA    Is the patient at goals for lung protective ventilation? NA    Head of bed elevation (30 degrees)? Yes    Skin breakdown assessment (prevention) completed? Yes    Is enteral nutrition at goal? No    Bowel program/frequency being addressed? Yes    Total fluid balance goal reviewed?  Yes Target Goals:        even to negative 1L [12h]             even to negative 1L [24h]   Is blood glucose at goal? Yes    Can Antibiotics be narrowed or discontinued? No      Gastric ulcer prophylaxis?  Yes If non-medication induced coagulopathy (INR-1.5 PTT > 2x normal or Plt<50k), mechanical ventilation 48hr, history of GI bleed/ulcer within past year. TBL, SCI, or burn, or if >= 2 minor risk factors (sepsis, ICU stay 1 week, occult GI bleed > 6 days. glucocorticoid therapy, NSAID use, antiplatelet use)   Deep venous thrombosis prophylaxis? Yes    Early mobility candidate and physical therapy consulted? Yes    Is de oliveira catheter needed? No    Is central venous/arterial catheter needed? No    Has the family been updated? No    Are the patient's goals of care and code status current? Yes

## 2025-02-05 NOTE — PROGRESS NOTES
Major Shift Events: No acute events this shift    A/O x4, occasionally drowsy but neuros intact and call light appropriate. SR to ST with movement, afebrile, MAP > 65 and systolic <160. Weaned off NC to RA, SOB with exertion, LS clear bilaterally, diminished in BLL, weak but productive cough. L pleural chest tube to water seal with serosang output 50cc Q2H. Voids in urinal without difficultly and had adequate output. NGT @ 51 to LIS having adequate amounts of thin bile output. Q4H 30mL NS flushes. Jtube running TF at goal of 20mL/hr. Sternal incision and lap sites covered, L thoracotomy ELVA. X2 R PIVs and a chest port infusing. Epidural with dilaudid/bupivacaine cont @7mL/hr and 7mL PCA dosing Q30min . Phos replaced x2, Mag x1. Ambulated with PT and up in chair for part of day.    Plan: Has floor orders, waiting for a bed  For vital signs and complete assessments, please see documentation flowsheets.    Deneen Felder RN

## 2025-02-05 NOTE — PLAN OF CARE
Major Shift Events: No acute events this shift.  Drowsy to alert, oriented x4. Neuros otherwise intact. Using call light appropriately/able to make needs known. Reporting 2-3/10 incisional pain. Dilaudid, bupivacaine epidural in place, running per orders, site WNL. Denied other complaints or discomforts. Slept between cares. Afebrile. Sinus rhythm, HR: 80-90s, ST elevation remains, team aware. Syst goal and MAP goal met without intervention. On 3L NC. LS clear/diminished. L chest tube to -20 suction, moderate serosanguinous output. NG to LIS, bile colored output, flushed per orders. J tube running TF at goal of 20 mL/hr with standard FWFs. Voids at bedside with urinal, up with Ax1. Incisions remain WNL. No new skin concerns. LR @ 75 mL/hr. PIV x2. L chest port. Phos replaced x1, second dose and recheck scheduled for later today.      Plan: Continue to follow POC. Notify team of any changes.      For vital signs and complete assessments, please see documentation flowsheets. Report given to oncoming RN.    Goal Outcome Evaluation:      Plan of Care Reviewed With: patient    Overall Patient Progress: improvingOverall Patient Progress: improving

## 2025-02-05 NOTE — PROGRESS NOTES
"THORACIC SURGERY PROGRESS NOTE     S: reports his pain is well controlled with epidural. Denies nausea or vomiting. Reports some burping, not yet passing flatus or having Bms. Voiding independently.      O:  /58   Pulse 86   Temp 98.2  F (36.8  C) (Oral)   Resp 22   Ht 1.88 m (6' 2\")   Wt 101.2 kg (223 lb 1.7 oz)   SpO2 97%   BMI 28.65 kg/m          Gen: alert and conversatoinal adult male in NAD  Resp: non labored breathing on 3L NC, left sided chest tube with serosang drainage, no air leak.  CV: RRR  Abd: soft, minimally tender, mildly distended, J tube in place. 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       Pain/neuro : epidural, discuss with pain if needing increased pain control  CV: PO amio for afib prophylaxis  Resp: wean O2 as able, chest tube to water seal. Daily CXR  GI: continue PPI, NG tube to LIS. Strict NPO. Advance TF as able  Hem: daily CBCs  PPx: lovenox DVT ppx. Protonix as above  Renal/FEN: mIVF. Replace electrolytes PRN.   PT/OT/SW: consults placed  [facility] [place] pending continued improvement in clinical course     Seen independently and discussed with staff, Dr. Christiano Fuentes MD  General Surgery PGY-1  Pager: [phone]        "

## 2025-02-06 ENCOUNTER — APPOINTMENT (OUTPATIENT)
Dept: OCCUPATIONAL THERAPY | Facility: CLINIC | Age: 35
End: 2025-02-06
Attending: THORACIC SURGERY (CARDIOTHORACIC VASCULAR SURGERY)
Payer: COMMERCIAL

## 2025-02-06 ENCOUNTER — APPOINTMENT (OUTPATIENT)
Dept: GENERAL RADIOLOGY | Facility: CLINIC | Age: 35
End: 2025-02-06
Attending: STUDENT IN AN ORGANIZED HEALTH CARE EDUCATION/TRAINING PROGRAM
Payer: COMMERCIAL

## 2025-02-06 ENCOUNTER — APPOINTMENT (OUTPATIENT)
Dept: PHYSICAL THERAPY | Facility: CLINIC | Age: 35
End: 2025-02-06
Attending: THORACIC SURGERY (CARDIOTHORACIC VASCULAR SURGERY)
Payer: COMMERCIAL

## 2025-02-06 LAB
ANION GAP SERPL CALCULATED.3IONS-SCNC: 9 MMOL/L (ref 7–15)
ATRIAL RATE - MUSE: 82 BPM
ATRIAL RATE - MUSE: 87 BPM
BUN SERPL-MCNC: 10.3 MG/DL (ref 6–20)
CALCIUM SERPL-MCNC: 8.3 MG/DL (ref 8.8–10.4)
CHLORIDE SERPL-SCNC: 104 MMOL/L (ref 98–107)
CREAT SERPL-MCNC: 0.79 MG/DL (ref 0.67–1.17)
DIASTOLIC BLOOD PRESSURE - MUSE: NORMAL MMHG
DIASTOLIC BLOOD PRESSURE - MUSE: NORMAL MMHG
EGFRCR SERPLBLD CKD-EPI 2021: >90 ML/MIN/1.73M2
ERYTHROCYTE [DISTWIDTH] IN BLOOD BY AUTOMATED COUNT: 14.4 % (ref 10–15)
GLUCOSE BLDC GLUCOMTR-MCNC: 100 MG/DL (ref 70–99)
GLUCOSE BLDC GLUCOMTR-MCNC: 104 MG/DL (ref 70–99)
GLUCOSE BLDC GLUCOMTR-MCNC: 121 MG/DL (ref 70–99)
GLUCOSE BLDC GLUCOMTR-MCNC: 135 MG/DL (ref 70–99)
GLUCOSE BLDC GLUCOMTR-MCNC: 140 MG/DL (ref 70–99)
GLUCOSE SERPL-MCNC: 120 MG/DL (ref 70–99)
HCO3 SERPL-SCNC: 24 MMOL/L (ref 22–29)
HCT VFR BLD AUTO: 32.8 % (ref 40–53)
HGB BLD-MCNC: 11.1 G/DL (ref 13.3–17.7)
INTERPRETATION ECG - MUSE: NORMAL
INTERPRETATION ECG - MUSE: NORMAL
MAGNESIUM SERPL-MCNC: 2.1 MG/DL (ref 1.7–2.3)
MCH RBC QN AUTO: 29.1 PG (ref 26.5–33)
MCHC RBC AUTO-ENTMCNC: 33.8 G/DL (ref 31.5–36.5)
MCV RBC AUTO: 86 FL (ref 78–100)
P AXIS - MUSE: 34 DEGREES
P AXIS - MUSE: 47 DEGREES
PHOSPHATE SERPL-MCNC: 2.2 MG/DL (ref 2.5–4.5)
PLATELET # BLD AUTO: 161 10E3/UL (ref 150–450)
POTASSIUM SERPL-SCNC: 4.2 MMOL/L (ref 3.4–5.3)
PR INTERVAL - MUSE: 138 MS
PR INTERVAL - MUSE: 146 MS
QRS DURATION - MUSE: 90 MS
QRS DURATION - MUSE: 92 MS
QT - MUSE: 346 MS
QT - MUSE: 360 MS
QTC - MUSE: 416 MS
QTC - MUSE: 420 MS
R AXIS - MUSE: 16 DEGREES
R AXIS - MUSE: 19 DEGREES
RBC # BLD AUTO: 3.81 10E6/UL (ref 4.4–5.9)
SODIUM SERPL-SCNC: 137 MMOL/L (ref 135–145)
SYSTOLIC BLOOD PRESSURE - MUSE: NORMAL MMHG
SYSTOLIC BLOOD PRESSURE - MUSE: NORMAL MMHG
T AXIS - MUSE: 32 DEGREES
T AXIS - MUSE: 48 DEGREES
VENTRICULAR RATE- MUSE: 82 BPM
VENTRICULAR RATE- MUSE: 87 BPM
WBC # BLD AUTO: 11.4 10E3/UL (ref 4–11)

## 2025-02-06 PROCEDURE — 258N000003 HC RX IP 258 OP 636: Performed by: STUDENT IN AN ORGANIZED HEALTH CARE EDUCATION/TRAINING PROGRAM

## 2025-02-06 PROCEDURE — 84100 ASSAY OF PHOSPHORUS: CPT | Performed by: STUDENT IN AN ORGANIZED HEALTH CARE EDUCATION/TRAINING PROGRAM

## 2025-02-06 PROCEDURE — 71045 X-RAY EXAM CHEST 1 VIEW: CPT | Mod: 26 | Performed by: RADIOLOGY

## 2025-02-06 PROCEDURE — 94640 AIRWAY INHALATION TREATMENT: CPT | Mod: 76

## 2025-02-06 PROCEDURE — 250N000011 HC RX IP 250 OP 636: Performed by: SURGERY

## 2025-02-06 PROCEDURE — 97535 SELF CARE MNGMENT TRAINING: CPT | Mod: GO | Performed by: OCCUPATIONAL THERAPIST

## 2025-02-06 PROCEDURE — 250N000013 HC RX MED GY IP 250 OP 250 PS 637: Performed by: SURGERY

## 2025-02-06 PROCEDURE — 250N000013 HC RX MED GY IP 250 OP 250 PS 637: Performed by: THORACIC SURGERY (CARDIOTHORACIC VASCULAR SURGERY)

## 2025-02-06 PROCEDURE — 71045 X-RAY EXAM CHEST 1 VIEW: CPT

## 2025-02-06 PROCEDURE — 258N000003 HC RX IP 258 OP 636: Performed by: THORACIC SURGERY (CARDIOTHORACIC VASCULAR SURGERY)

## 2025-02-06 PROCEDURE — 999N000157 HC STATISTIC RCP TIME EA 10 MIN

## 2025-02-06 PROCEDURE — 82374 ASSAY BLOOD CARBON DIOXIDE: CPT | Performed by: STUDENT IN AN ORGANIZED HEALTH CARE EDUCATION/TRAINING PROGRAM

## 2025-02-06 PROCEDURE — 01996 DLY HOSP MGMT EDRL RX ADMIN: CPT | Mod: GC | Performed by: ANESTHESIOLOGY

## 2025-02-06 PROCEDURE — 97530 THERAPEUTIC ACTIVITIES: CPT | Mod: GP | Performed by: STUDENT IN AN ORGANIZED HEALTH CARE EDUCATION/TRAINING PROGRAM

## 2025-02-06 PROCEDURE — 97116 GAIT TRAINING THERAPY: CPT | Mod: GP | Performed by: STUDENT IN AN ORGANIZED HEALTH CARE EDUCATION/TRAINING PROGRAM

## 2025-02-06 PROCEDURE — 250N000009 HC RX 250: Performed by: THORACIC SURGERY (CARDIOTHORACIC VASCULAR SURGERY)

## 2025-02-06 PROCEDURE — 250N000009 HC RX 250

## 2025-02-06 PROCEDURE — 120N000005 HC R&B MS OVERFLOW UMMC

## 2025-02-06 PROCEDURE — 85014 HEMATOCRIT: CPT | Performed by: STUDENT IN AN ORGANIZED HEALTH CARE EDUCATION/TRAINING PROGRAM

## 2025-02-06 PROCEDURE — 80048 BASIC METABOLIC PNL TOTAL CA: CPT | Performed by: STUDENT IN AN ORGANIZED HEALTH CARE EDUCATION/TRAINING PROGRAM

## 2025-02-06 PROCEDURE — 94799 UNLISTED PULMONARY SVC/PX: CPT

## 2025-02-06 PROCEDURE — 83735 ASSAY OF MAGNESIUM: CPT | Performed by: STUDENT IN AN ORGANIZED HEALTH CARE EDUCATION/TRAINING PROGRAM

## 2025-02-06 PROCEDURE — 250N000013 HC RX MED GY IP 250 OP 250 PS 637: Performed by: STUDENT IN AN ORGANIZED HEALTH CARE EDUCATION/TRAINING PROGRAM

## 2025-02-06 PROCEDURE — 250N000011 HC RX IP 250 OP 636: Performed by: STUDENT IN AN ORGANIZED HEALTH CARE EDUCATION/TRAINING PROGRAM

## 2025-02-06 RX ORDER — BISACODYL 10 MG
10 SUPPOSITORY, RECTAL RECTAL DAILY PRN
Status: DISCONTINUED | OUTPATIENT
Start: 2025-02-06 | End: 2025-02-11 | Stop reason: HOSPADM

## 2025-02-06 RX ORDER — POTASSIUM PHOS IN 0.9 % NACL 15MMOL/250
15 PLASTIC BAG, INJECTION (ML) INTRAVENOUS ONCE
Status: COMPLETED | OUTPATIENT
Start: 2025-02-06 | End: 2025-02-06

## 2025-02-06 RX ORDER — LIDOCAINE HYDROCHLORIDE 20 MG/ML
JELLY TOPICAL ONCE
Status: COMPLETED | OUTPATIENT
Start: 2025-02-06 | End: 2025-02-06

## 2025-02-06 RX ADMIN — Medication 15 ML: at 08:21

## 2025-02-06 RX ADMIN — TOPICAL ANESTHETIC 0.5 ML: 200 SPRAY DENTAL; PERIODONTAL at 20:14

## 2025-02-06 RX ADMIN — POLYETHYLENE GLYCOL 3350 17 G: 17 POWDER, FOR SOLUTION ORAL at 08:21

## 2025-02-06 RX ADMIN — ENOXAPARIN SODIUM 40 MG: 40 INJECTION SUBCUTANEOUS at 13:27

## 2025-02-06 RX ADMIN — INSULIN ASPART 1 UNITS: 100 INJECTION, SOLUTION INTRAVENOUS; SUBCUTANEOUS at 23:29

## 2025-02-06 RX ADMIN — ACETAMINOPHEN 975 MG: 160 SOLUTION ORAL at 18:23

## 2025-02-06 RX ADMIN — FAMOTIDINE 20 MG: 40 POWDER, FOR SUSPENSION ORAL at 08:21

## 2025-02-06 RX ADMIN — ALBUTEROL SULFATE 2.5 MG: 2.5 SOLUTION RESPIRATORY (INHALATION) at 20:44

## 2025-02-06 RX ADMIN — ALBUTEROL SULFATE 2.5 MG: 2.5 SOLUTION RESPIRATORY (INHALATION) at 09:15

## 2025-02-06 RX ADMIN — POTASSIUM & SODIUM PHOSPHATES POWDER PACK 280-160-250 MG 2 PACKET: 280-160-250 PACK at 20:07

## 2025-02-06 RX ADMIN — POTASSIUM & SODIUM PHOSPHATES POWDER PACK 280-160-250 MG 2 PACKET: 280-160-250 PACK at 08:21

## 2025-02-06 RX ADMIN — TOPICAL ANESTHETIC 0.5 ML: 200 SPRAY DENTAL; PERIODONTAL at 23:24

## 2025-02-06 RX ADMIN — POTASSIUM & SODIUM PHOSPHATES POWDER PACK 280-160-250 MG 2 PACKET: 280-160-250 PACK at 16:33

## 2025-02-06 RX ADMIN — LIDOCAINE HYDROCHLORIDE: 20 JELLY TOPICAL at 18:23

## 2025-02-06 RX ADMIN — POTASSIUM PHOSPHATE, MONOBASIC POTASSIUM PHOSPHATE, DIBASIC 15 MMOL: 224; 236 INJECTION, SOLUTION, CONCENTRATE INTRAVENOUS at 05:30

## 2025-02-06 RX ADMIN — ACETAMINOPHEN 975 MG: 160 SOLUTION ORAL at 00:13

## 2025-02-06 RX ADMIN — SENNOSIDES 5 ML: 8.8 LIQUID ORAL at 23:24

## 2025-02-06 RX ADMIN — ACETAMINOPHEN 975 MG: 160 SOLUTION ORAL at 10:01

## 2025-02-06 RX ADMIN — ACETYLCYSTEINE 2 ML: 200 SOLUTION ORAL; RESPIRATORY (INHALATION) at 20:44

## 2025-02-06 RX ADMIN — TOPICAL ANESTHETIC 1 ML: 200 SPRAY DENTAL; PERIODONTAL at 10:03

## 2025-02-06 RX ADMIN — POTASSIUM & SODIUM PHOSPHATES POWDER PACK 280-160-250 MG 2 PACKET: 280-160-250 PACK at 13:27

## 2025-02-06 RX ADMIN — AMIODARONE HYDROCHLORIDE 400 MG: 200 TABLET ORAL at 08:21

## 2025-02-06 RX ADMIN — ACETYLCYSTEINE 2 ML: 200 SOLUTION ORAL; RESPIRATORY (INHALATION) at 09:15

## 2025-02-06 RX ADMIN — HYDROMORPHONE HYDROCHLORIDE: 1 INJECTION, SOLUTION INTRAMUSCULAR; INTRAVENOUS; SUBCUTANEOUS at 09:42

## 2025-02-06 RX ADMIN — AMIODARONE HYDROCHLORIDE 400 MG: 200 TABLET ORAL at 20:07

## 2025-02-06 ASSESSMENT — ACTIVITIES OF DAILY LIVING (ADL)
ADLS_ACUITY_SCORE: 45

## 2025-02-06 NOTE — PROGRESS NOTES
RCAT Assessment for chest physiotherapy and airway clearance      Assessment:     Reason for Assessment: Thoracic Surgery (02/05/25 2036)    Pulmonary History:    Pulmonary Status: No history of smoking (02/05/25 2036)    Surgical:  Surgical Status: Thoracic or upper abdominal (02/05/25 2036)    CXR:   Chest X-ray: Infiltrate and atelectasis and/or pleural effusions (02/05/25 2036)    Respiratory Pattern:    Respiratory Pattern: Regular pattern 12-20 (02/05/25 2036)    Breath Sounds:    Breath Sounds: Clear to auscultation (02/05/25 2036)    Effectiveness of Cough:     Cough Effectiveness: Strong, productive (02/05/25 2036)    Mental Status:    Mental Status: Alert, oriented, cooperative (02/05/25 2036)    Level of Activity:     Acuity Level : Acuity 4 (6-10 points) (02/05/25 2036)    Current O2 Requirement:   O2 Required for SpO2>=92%: No oxygen (02/05/25 2036)    Reassessment in 7 days. Nebulizers scheduled BID and Aerobika (flutter valve) QID. Patient demonstrates good technique with Aerobika, has a strong productive cough and on room air.       Patient was started on Flutter Valve as indicated by Increased sputum volume or consistency and prevent or reverse atelectasis    Based on assessment, therapy QID and it is appropriate to perform Flutter Valve on patient.    Guadalupe Pressley, RT on 2/5/2025 at 8:44 PM

## 2025-02-06 NOTE — PROGRESS NOTES
THORACIC SURGERY PROGRESS NOTE     S: Intermittent mild nausea, no vomiting, tolerating tube feeds. Pain well controlled. Urinating normally. Not yet passing flatus, has not had a bowel movement. Ambulated about the unit yesterday, felt well with this.      O:  Patient Vitals for the past 24 hrs:   BP Temp Temp src Pulse Resp SpO2 Weight   02/06/25 0800 -- 99  F (37.2  C) Oral -- -- -- --   02/06/25 0600 -- -- -- 87 20 93 % --   02/06/25 0500 -- -- -- 93 21 93 % --   02/06/25 0400 126/79 98.2  F (36.8  C) Axillary 89 20 95 % --   02/06/25 0300 -- -- -- 91 18 96 % --   02/06/25 0200 -- -- -- 86 19 95 % --   02/06/25 0100 -- -- -- 106 20 95 % --   02/06/25 0000 112/76 100.8  F (38.2  C) Axillary 90 22 94 % --   02/05/25 2300 123/84 -- -- 92 21 98 % --   02/05/25 2200 114/71 -- -- 93 19 93 % --   02/05/25 2100 127/78 -- -- 103 22 95 % --   02/05/25 2036 126/80 -- -- 98 21 92 % --   02/05/25 2000 124/78 99.3  F (37.4  C) Axillary 91 20 96 % 99.7 kg (219 lb 12.8 oz)   02/05/25 1800 117/69 -- -- 95 22 92 % --   02/05/25 1700 118/72 -- -- 102 21 93 % --   02/05/25 1600 121/85 97.9  F (36.6  C) Oral 88 21 96 % --   02/05/25 1500 111/73 -- -- 87 18 92 % --   02/05/25 1400 -- -- -- 102 26 94 % --   02/05/25 1300 115/74 -- -- 94 22 98 % --   02/05/25 1200 103/57 99.6  F (37.6  C) Oral 96 23 92 % --   02/05/25 1100 118/71 -- -- 98 26 95 % --   02/05/25 1000 112/62 -- -- 106 24 95 % --        Gen: Sitting upright in bed, appears comfortable, no acute distress.  Resp: non labored breathing, chest tube with serosang drainage and no air leak left chest incision c/d/I closed with dermabond  CV: RRR  Abd: Mildly distended, NG and J tubes in place, abdominal incision clean, dry, and intact without drainage or significant surrounding erythema.   Ext: warm and well perfused, no edema, SCDs on     A/P: This is a 35 y.o. male pt w hx Seiwart 3 EJ adenocarcinoma with linitis plastica s/p neoadjuvant chemotherapy and on POD3 from EGD,  laparoscopic dissection of the hiatus and mediastinum, laparotomy and left thoracotomy with gastrectomy, proximal esophagectomy, intrathoracic RXY esophagojejunostomy, D2 abdominal lymphadenectomy, jejunostomy tube placement, cryoablation of left intercostal nerves 5-10 and left chest tube placement.     Changes Today  -Discontinue famotidine (no stomach to produce acid)  -Bisacodyl suppository  -Benzocaine spray for NG tube irritation    Pain/neuro : Well controlled on current regimen, has epidural with PCA, pain following  CV: Amiodarone for atrial fibrillation prophy   Resp: Chest tube to water seal, daily CXR. Continue nebs q8h.   GI: Strict NPO. NG to LIS. Benzocaine spray for NG tube irritation. Bisacodyl suppository. TF at 20. Do not advance until passing flatus. Discontinue famotidine.   ID: Pre and intraoperative abx given. Discontinued within 24 hours  Hem: CBC qday,   PPx: Enoxaparin for vte prophy   Renal/FEN: Replace electrolytes PRN  PT/OT/SW: See notes, PT/OT recommend discharge with home cares  [place] [facility] [place], awaiting bed assignment.     Misbah Lawrence MD  [place] Resident, General Surgery  Thoracic Surgery Service

## 2025-02-06 NOTE — PROGRESS NOTES
Pain Service Progress Note  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    Procedure: Laparoscopy, Laparotomy, Left Thoractomy, Total Gastrectomy, Distal Esophagectomy and Omentectomy, Left Chest Tube Placement  MELONY EN Y ESOPHAGOJEJUNOSTOMY, FEEDING JEJUNOSTOMY  Esophagoscopy, gastroscopy, duodenoscopy (EGD), combined    Date of Surgery: 2/3/2025    Date of Catheter Placement: 2/3/2025    Plan/Recommendations:  1. Regional Anesthesia/Analgesia  -Continuous Catheter Type/Site: Thoracic Epidurlal T8-9  Infusate: Hydromorphone 6 mcg/mL, Bupivicaine 0.0625% in sodium chloride 0.9% 250 mL PIB + PCEA    Programmed Intermittent Bolus (PIB) at 7 mL Q60 min via each catheter, total infusion rate of 7 mL/hr    Plan to maintain catheter 7, max of 7 days    2. Anticoagulation  -Please contact Inpatient Pain Service before ordering or making any anticoagulation changes       3. Multimodal Analgesia  - Thoracic Epidurlal T8-9  Infusate: Hydromorphone 6 mcg/mL, Bupivicaine 0.0625% in sodium chloride 0.9% 250 mL PIB + PCEA  - Scheduled Tylenol 975 mg q8h  - Scheduled Robaxin 1000 mg q8h    Pain Service will continue to follow.    Discussed with attending anesthesiologist    Adry Fox MD, MPH  Anesthesiology, PGY3, CA2  Inpatient Pain Service   02/06/2025     Overnight Events: NAEO.    Tubes/Drains: Yes      Subjective:  I  "think I'm okay with my pain. I was able to get up into a chair.\"  Nausea: No  Vomiting: No  Pruritus: No  Symptoms of LAST: No    Pain Location:  Incisional pain    Pain Intensity:    Pain at Rest: 2/10   Pain with Activity: 3/10 when getting up to chair  Comfort Goal: 3/10   Baseline Pain: 0/10   Satisfied with your level of pain control: Yes    Diet: NPO for Medical/Clinical Reasons Except for: No Exceptions  Adult Formula Drip Feeding: Continuous Pivot 1.5; Jejunostomy; Goal Rate: 20 mL/hr (goal for now) - start TF @ 10 mL/hr. If tolerated, in 6 hours, advance to 20 mL/hr and keep at this rate.; mL/hr; Do not advance tube feeding rate unless K+ is = o...    Relevant Labs:  Recent Labs   Lab Test 02/04/25  0512      BUN 9.8       Physical Exam:  Vitals: /74 (BP Location: Right arm)   Pulse 112   Temp 37.2  C (99  F) (Oral)   Resp 25   Ht 1.88 m (6' 2\")   Wt 99.7 kg (219 lb 12.8 oz)   SpO2 97%   BMI 28.22 kg/m      Physical Exam:   Orientation:  Alert, oriented, and in no acute distress: Yes  Sedation: No    Motor Examination:  5/5 Strength in lower extremities: Yes    Sensory Level:   Decrease in sensation: Yes    Catheter Site:   Catheter entry site is clean/dry/intact: Yes    Tender: No      Relevant Medications:  Current Pain Medications:  Medications related to Pain Management (From now, onward)      Start     Dose/Rate Route Frequency Ordered Stop    02/04/25 1300  methocarbamol (ROBAXIN) injection 1,000 mg         1,000 mg Intravenous EVERY 8 HOURS 02/04/25 0704 02/05/25 2059 02/04/25 0800  polyethylene glycol (MIRALAX) Packet 17 g         17 g Per Feeding Tube DAILY 02/04/25 0533      02/04/25 0600  senna-docusate (SENOKOT-S/PERICOLACE) 8.6-50 MG per tablet 1 tablet         1 tablet Per Feeding Tube AT BEDTIME 02/04/25 0533      02/03/25 2000  acetaminophen (TYLENOL) tablet 975 mg         975 mg Per J Tube EVERY 8 HOURS 02/03/25 1933      02/03/25 0830  HYDROmorphone (DILAUDID) 6 " "mcg/mL, BUPivacaine (MARCAINE) 0.0625 % in sodium chloride 0.9 % 250 mL PIB + PCEA          EPIDURAL PIB 02/03/25 0801      02/03/25 0756  nalbuphine (NUBAIN) injection 2.5-5 mg         2.5-5 mg Intravenous EVERY 6 HOURS PRN 02/03/25 0801              Primary Service Contacted with Recommendations? Yes      Please see A&P for additional details of medical decision making.  MANAGEMENT DISCUSSED with the following over the past 24 hours: Dr. Abdelrahman Siegel       Acute Inpatient Pain Service [facility]  Hours of pain coverage 24/7   Page via Amcom- Please Page the Pain Team [facility]: \"PAIN MANAGEMENT ACUTE INPATIENT/ [facility]\"       Ardy Fox MD, MPH  Anesthesiology, PGY3, CA2  Inpatient Pain Service       "

## 2025-02-06 NOTE — PROGRESS NOTES
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    Procedure: Laparoscopy, Laparotomy, Left Thoractomy, Total Gastrectomy, Distal Esophagectomy and Omentectomy, Left Chest Tube Placement  MELONY EN Y ESOPHAGOJEJUNOSTOMY, FEEDING JEJUNOSTOMY  Esophagoscopy, gastroscopy, duodenoscopy (EGD), combined    Date of Surgery: 2/3/2025    Date of Catheter Placement: 2/3/2025    Plan/Recommendations:  1. Regional Anesthesia/Analgesia  -Continuous Catheter Type/Site: Thoracic Epidurlal T8-9  Infusate: Hydromorphone 6 mcg/mL, Bupivicaine 0.0625% in sodium chloride 0.9% 250 mL PIB + PCEA    Programmed Intermittent Bolus (PIB) at 7 mL Q60 min via each catheter, total infusion rate of 7 mL/hr    Plan to maintain catheter 7, max of 7 days    2. Anticoagulation  -Please contact Inpatient Pain Service before ordering or making any anticoagulation changes       3. Multimodal Analgesia  - Thoracic Epidurlal T8-9  Infusate: Hydromorphone 6 mcg/mL, Bupivicaine 0.0625% in sodium chloride 0.9% 250 mL PIB + PCEA  - Scheduled Tylenol 975 mg q8h  - Scheduled Robaxin 1000 mg q8h    Pain Service will continue to follow.    Discussed with attending anesthesiologist    Adry Fox MD, MPH  Anesthesiology, PGY3, CA2  Inpatient Pain Service   02/06/2025     Overnight Events: NAEO.    Tubes/Drains: Yes      Subjective:  " I'm doing fine. Pain was okay when I was working with PT.\"  Nausea: No  Vomiting: No  Pruritus: No  Symptoms of LAST: No    Pain Location:  Incisional pain    Pain Intensity:    Pain at Rest: 3/10   Pain with Activity: 4/10 with PT  Comfort Goal: 3/10   Baseline Pain: 0/10   Satisfied with your level of pain control: Yes    Diet: NPO for Medical/Clinical Reasons Except for: No Exceptions  Adult Formula Drip Feeding: Continuous Pivot 1.5; Jejunostomy; Goal Rate: 20 mL/hr (goal for now) - start TF @ 10 mL/hr. If tolerated, in 6 hours, advance to 20 mL/hr and keep at this rate.; mL/hr; Do not advance tube feeding rate unless K+ is = o...    Relevant Labs:  Recent Labs   Lab Test 02/04/25  0512      BUN 9.8       Physical Exam:  Vitals: /74 (BP Location: Right arm)   Pulse 112   Temp 37.2  C (99  F) (Oral)   Resp 25   Ht 1.88 m (6' 2\")   Wt 99.7 kg (219 lb 12.8 oz)   SpO2 97%   BMI 28.22 kg/m      Physical Exam:   Orientation:  Alert, oriented, and in no acute distress: Yes  Sedation: No    Motor Examination:  5/5 Strength in lower extremities: Yes    Sensory Level:   Decrease in sensation: Yes    Catheter Site:   Catheter entry site is clean/dry/intact: Yes    Tender: No      Relevant Medications:  Current Pain Medications:  Medications related to Pain Management (From now, onward)      Start     Dose/Rate Route Frequency Ordered Stop    02/04/25 1300  methocarbamol (ROBAXIN) injection 1,000 mg         1,000 mg Intravenous EVERY 8 HOURS 02/04/25 0704 02/05/25 2059 02/04/25 0800  polyethylene glycol (MIRALAX) Packet 17 g         17 g Per Feeding Tube DAILY 02/04/25 0533      02/04/25 0600  senna-docusate (SENOKOT-S/PERICOLACE) 8.6-50 MG per tablet 1 tablet         1 tablet Per Feeding Tube AT BEDTIME 02/04/25 0533      02/03/25 2000  acetaminophen (TYLENOL) tablet 975 mg         975 mg Per J Tube EVERY 8 HOURS 02/03/25 1933      02/03/25 0830  HYDROmorphone (DILAUDID) 6 mcg/mL, BUPivacaine " "(MARCAINE) 0.0625 % in sodium chloride 0.9 % 250 mL PIB + PCEA          EPIDURAL PIB 02/03/25 0801      02/03/25 0756  nalbuphine (NUBAIN) injection 2.5-5 mg         2.5-5 mg Intravenous EVERY 6 HOURS PRN 02/03/25 0801              Primary Service Contacted with Recommendations? Yes      Please see A&P for additional details of medical decision making.  MANAGEMENT DISCUSSED with the following over the past 24 hours: Dr. Abdelrahman Siegel       Acute Inpatient Pain Service [facility]  Hours of pain coverage 24/7   Page via Amcom- Please Page the Pain Team Via [facility]: \"PAIN MANAGEMENT ACUTE INPATIENT/ [facility] [place] [facility]\"       Adry Fox MD, MPH  Anesthesiology, PGY3, CA2  Inpatient Pain Service       "

## 2025-02-06 NOTE — PLAN OF CARE
Goal Outcome Evaluation:           Overall Patient Progress: improvingOverall Patient Progress: improving       A/Ox4, SR with systolic and MAP goals met w/o intervention. Room air. Pain controlled with epidural, CT to water seal serosanguineous output, TF via J tube @ 20ml, NG to LIS yellow tinge output. Temp max 100.8 F, resolved with tylenol. Incision dressing dry intact, no drainage. Replaced phos. Adequate UOP w/ urinal. Pending transfer.

## 2025-02-07 ENCOUNTER — APPOINTMENT (OUTPATIENT)
Dept: GENERAL RADIOLOGY | Facility: CLINIC | Age: 35
End: 2025-02-07
Attending: STUDENT IN AN ORGANIZED HEALTH CARE EDUCATION/TRAINING PROGRAM
Payer: COMMERCIAL

## 2025-02-07 ENCOUNTER — APPOINTMENT (OUTPATIENT)
Dept: PHYSICAL THERAPY | Facility: CLINIC | Age: 35
End: 2025-02-07
Attending: THORACIC SURGERY (CARDIOTHORACIC VASCULAR SURGERY)
Payer: COMMERCIAL

## 2025-02-07 ENCOUNTER — APPOINTMENT (OUTPATIENT)
Dept: OCCUPATIONAL THERAPY | Facility: CLINIC | Age: 35
End: 2025-02-07
Attending: THORACIC SURGERY (CARDIOTHORACIC VASCULAR SURGERY)
Payer: COMMERCIAL

## 2025-02-07 VITALS
BODY MASS INDEX: 27.36 KG/M2 | HEART RATE: 86 BPM | RESPIRATION RATE: 20 BRPM | TEMPERATURE: 98.1 F | SYSTOLIC BLOOD PRESSURE: 115 MMHG | HEIGHT: 74 IN | OXYGEN SATURATION: 94 % | WEIGHT: 213.19 LBS | DIASTOLIC BLOOD PRESSURE: 78 MMHG

## 2025-02-07 LAB
ANION GAP SERPL CALCULATED.3IONS-SCNC: 13 MMOL/L (ref 7–15)
BUN SERPL-MCNC: 15 MG/DL (ref 6–20)
CALCIUM SERPL-MCNC: 8.8 MG/DL (ref 8.8–10.4)
CHLORIDE SERPL-SCNC: 105 MMOL/L (ref 98–107)
CREAT SERPL-MCNC: 0.77 MG/DL (ref 0.67–1.17)
EGFRCR SERPLBLD CKD-EPI 2021: >90 ML/MIN/1.73M2
ERYTHROCYTE [DISTWIDTH] IN BLOOD BY AUTOMATED COUNT: 14.4 % (ref 10–15)
GLUCOSE BLDC GLUCOMTR-MCNC: 101 MG/DL (ref 70–99)
GLUCOSE BLDC GLUCOMTR-MCNC: 113 MG/DL (ref 70–99)
GLUCOSE BLDC GLUCOMTR-MCNC: 115 MG/DL (ref 70–99)
GLUCOSE BLDC GLUCOMTR-MCNC: 146 MG/DL (ref 70–99)
GLUCOSE BLDC GLUCOMTR-MCNC: 155 MG/DL (ref 70–99)
GLUCOSE SERPL-MCNC: 113 MG/DL (ref 70–99)
HCO3 SERPL-SCNC: 23 MMOL/L (ref 22–29)
HCT VFR BLD AUTO: 33.4 % (ref 40–53)
HGB BLD-MCNC: 11.1 G/DL (ref 13.3–17.7)
MAGNESIUM SERPL-MCNC: 2 MG/DL (ref 1.7–2.3)
MCH RBC QN AUTO: 28.5 PG (ref 26.5–33)
MCHC RBC AUTO-ENTMCNC: 33.2 G/DL (ref 31.5–36.5)
MCV RBC AUTO: 86 FL (ref 78–100)
PATH REPORT.COMMENTS IMP SPEC: NORMAL
PATH REPORT.FINAL DX SPEC: NORMAL
PATH REPORT.GROSS SPEC: NORMAL
PATH REPORT.INTRAOP OBS SPEC DOC: NORMAL
PATH REPORT.MICROSCOPIC SPEC OTHER STN: NORMAL
PATH REPORT.RELEVANT HX SPEC: NORMAL
PATHOLOGY SYNOPTIC REPORT: NORMAL
PHOSPHATE SERPL-MCNC: 3.4 MG/DL (ref 2.5–4.5)
PHOTO IMAGE: NORMAL
PLATELET # BLD AUTO: 219 10E3/UL (ref 150–450)
POTASSIUM SERPL-SCNC: 4 MMOL/L (ref 3.4–5.3)
RBC # BLD AUTO: 3.9 10E6/UL (ref 4.4–5.9)
SODIUM SERPL-SCNC: 141 MMOL/L (ref 135–145)
WBC # BLD AUTO: 10.8 10E3/UL (ref 4–11)

## 2025-02-07 PROCEDURE — 80048 BASIC METABOLIC PNL TOTAL CA: CPT | Performed by: STUDENT IN AN ORGANIZED HEALTH CARE EDUCATION/TRAINING PROGRAM

## 2025-02-07 PROCEDURE — 84100 ASSAY OF PHOSPHORUS: CPT | Performed by: STUDENT IN AN ORGANIZED HEALTH CARE EDUCATION/TRAINING PROGRAM

## 2025-02-07 PROCEDURE — 250N000011 HC RX IP 250 OP 636: Performed by: SURGERY

## 2025-02-07 PROCEDURE — 250N000013 HC RX MED GY IP 250 OP 250 PS 637: Performed by: STUDENT IN AN ORGANIZED HEALTH CARE EDUCATION/TRAINING PROGRAM

## 2025-02-07 PROCEDURE — 94640 AIRWAY INHALATION TREATMENT: CPT | Mod: 76

## 2025-02-07 PROCEDURE — 71045 X-RAY EXAM CHEST 1 VIEW: CPT | Mod: 26 | Performed by: RADIOLOGY

## 2025-02-07 PROCEDURE — 97530 THERAPEUTIC ACTIVITIES: CPT | Mod: GP

## 2025-02-07 PROCEDURE — 250N000011 HC RX IP 250 OP 636: Performed by: THORACIC SURGERY (CARDIOTHORACIC VASCULAR SURGERY)

## 2025-02-07 PROCEDURE — 250N000013 HC RX MED GY IP 250 OP 250 PS 637: Performed by: SURGERY

## 2025-02-07 PROCEDURE — 85014 HEMATOCRIT: CPT | Performed by: STUDENT IN AN ORGANIZED HEALTH CARE EDUCATION/TRAINING PROGRAM

## 2025-02-07 PROCEDURE — 97110 THERAPEUTIC EXERCISES: CPT | Mod: GO | Performed by: OCCUPATIONAL THERAPIST

## 2025-02-07 PROCEDURE — 97110 THERAPEUTIC EXERCISES: CPT | Mod: GP

## 2025-02-07 PROCEDURE — 94640 AIRWAY INHALATION TREATMENT: CPT

## 2025-02-07 PROCEDURE — 97535 SELF CARE MNGMENT TRAINING: CPT | Mod: GO | Performed by: OCCUPATIONAL THERAPIST

## 2025-02-07 PROCEDURE — 120N000005 HC R&B MS OVERFLOW UMMC

## 2025-02-07 PROCEDURE — 71045 X-RAY EXAM CHEST 1 VIEW: CPT

## 2025-02-07 PROCEDURE — 85048 AUTOMATED LEUKOCYTE COUNT: CPT | Performed by: STUDENT IN AN ORGANIZED HEALTH CARE EDUCATION/TRAINING PROGRAM

## 2025-02-07 PROCEDURE — 250N000009 HC RX 250: Performed by: THORACIC SURGERY (CARDIOTHORACIC VASCULAR SURGERY)

## 2025-02-07 PROCEDURE — 250N000009 HC RX 250

## 2025-02-07 PROCEDURE — 999N000157 HC STATISTIC RCP TIME EA 10 MIN

## 2025-02-07 PROCEDURE — 250N000013 HC RX MED GY IP 250 OP 250 PS 637: Performed by: THORACIC SURGERY (CARDIOTHORACIC VASCULAR SURGERY)

## 2025-02-07 PROCEDURE — 83735 ASSAY OF MAGNESIUM: CPT | Performed by: STUDENT IN AN ORGANIZED HEALTH CARE EDUCATION/TRAINING PROGRAM

## 2025-02-07 RX ORDER — MAGNESIUM SULFATE HEPTAHYDRATE 40 MG/ML
2 INJECTION, SOLUTION INTRAVENOUS ONCE
Status: COMPLETED | OUTPATIENT
Start: 2025-02-07 | End: 2025-02-07

## 2025-02-07 RX ADMIN — MAGNESIUM SULFATE HEPTAHYDRATE 2 G: 40 INJECTION, SOLUTION INTRAVENOUS at 05:49

## 2025-02-07 RX ADMIN — TOPICAL ANESTHETIC 0.5 ML: 200 SPRAY DENTAL; PERIODONTAL at 05:38

## 2025-02-07 RX ADMIN — TOPICAL ANESTHETIC 0.5 ML: 200 SPRAY DENTAL; PERIODONTAL at 18:40

## 2025-02-07 RX ADMIN — AMIODARONE HYDROCHLORIDE 400 MG: 200 TABLET ORAL at 20:35

## 2025-02-07 RX ADMIN — TOPICAL ANESTHETIC 0.5 ML: 200 SPRAY DENTAL; PERIODONTAL at 02:00

## 2025-02-07 RX ADMIN — ENOXAPARIN SODIUM 40 MG: 40 INJECTION SUBCUTANEOUS at 11:26

## 2025-02-07 RX ADMIN — INSULIN ASPART 1 UNITS: 100 INJECTION, SOLUTION INTRAVENOUS; SUBCUTANEOUS at 11:52

## 2025-02-07 RX ADMIN — ACETAMINOPHEN 975 MG: 160 SOLUTION ORAL at 20:35

## 2025-02-07 RX ADMIN — ACETAMINOPHEN 975 MG: 160 SOLUTION ORAL at 11:26

## 2025-02-07 RX ADMIN — ACETYLCYSTEINE 2 ML: 200 SOLUTION ORAL; RESPIRATORY (INHALATION) at 08:41

## 2025-02-07 RX ADMIN — TOPICAL ANESTHETIC 0.5 ML: 200 SPRAY DENTAL; PERIODONTAL at 09:02

## 2025-02-07 RX ADMIN — POTASSIUM & SODIUM PHOSPHATES POWDER PACK 280-160-250 MG 2 PACKET: 280-160-250 PACK at 08:30

## 2025-02-07 RX ADMIN — ACETYLCYSTEINE 2 ML: 200 SOLUTION ORAL; RESPIRATORY (INHALATION) at 20:06

## 2025-02-07 RX ADMIN — AMIODARONE HYDROCHLORIDE 400 MG: 200 TABLET ORAL at 08:30

## 2025-02-07 RX ADMIN — ACETAMINOPHEN 975 MG: 160 SOLUTION ORAL at 02:00

## 2025-02-07 RX ADMIN — TOPICAL ANESTHETIC 0.5 ML: 200 SPRAY DENTAL; PERIODONTAL at 11:24

## 2025-02-07 RX ADMIN — INSULIN ASPART 1 UNITS: 100 INJECTION, SOLUTION INTRAVENOUS; SUBCUTANEOUS at 20:39

## 2025-02-07 RX ADMIN — TOPICAL ANESTHETIC 0.5 ML: 200 SPRAY DENTAL; PERIODONTAL at 15:49

## 2025-02-07 RX ADMIN — ALBUTEROL SULFATE 2.5 MG: 2.5 SOLUTION RESPIRATORY (INHALATION) at 20:06

## 2025-02-07 RX ADMIN — POLYETHYLENE GLYCOL 3350 17 G: 17 POWDER, FOR SOLUTION ORAL at 08:30

## 2025-02-07 RX ADMIN — ALBUTEROL SULFATE 2.5 MG: 2.5 SOLUTION RESPIRATORY (INHALATION) at 08:41

## 2025-02-07 RX ADMIN — Medication 15 ML: at 08:30

## 2025-02-07 RX ADMIN — TOPICAL ANESTHETIC 0.5 ML: 200 SPRAY DENTAL; PERIODONTAL at 21:26

## 2025-02-07 ASSESSMENT — ACTIVITIES OF DAILY LIVING (ADL)
ADLS_ACUITY_SCORE: 45

## 2025-02-07 NOTE — PROGRESS NOTES
"CLINICAL NUTRITION SERVICES - BRIEF NOTE      Reason for RD note: Advancing EN per thoracic    New Findings/Chart Review:  EN via Jtube: Pivot @ 20 ml/hr  Labs reviewed - phos now WNL  First BM x 1 today    Interventions:  - EN orders adjusted: Pivot 1.5 Arturo (or equivalent)  @ goal of  75ml/hr  (1800ml/day) provides: 2700 kcals (27 kcal/kg), 168 g PRO (1.7 g pro/kg), 1350 ml free H20, 310 g CHO, and 13 g fiber daily    - Increase to 30ml/hr, adv by 10ml q 6 hours to goal   - Do not advance unless K+ >= 3, Mg++>= 1.5 and phos >=1.9    Nutrition will continue to follow per protocol.    Riya Whitmore, RD, LD, CNSC  \"4E Clinical Dietitian\" on Vocera  Weekend/Holiday RD - \"Weekend Clinical Dietitian\" on vocera    "

## 2025-02-07 NOTE — PLAN OF CARE
ICU End of Shift Summary. See flowsheets for vital signs and detailed assessment.    Changes this shift: A&Ox4. VSS on room air. Chest tube to water seal. NG to gravity. Epidural controlling surgical pain. PRN hurricane spray for sore throat.    Plan: Transfer to 7th floor when bed becomes available. Follow POC.

## 2025-02-07 NOTE — PLAN OF CARE
Goal Outcome Evaluation:  Major Shift Events: Oriented. SR, normotensive and afebrile on RA, minor SOB with activity. Afebrile, palpable pulses. L Ct with minimal serous output. NPO, TF @ 20 ml/hr SFWF via J tube. NG to LIS with 1L out bile/yellow. Voiding spontaneously. Midsternal incision stapled, Lap sites CDI, L thora approximated/dermabonded. L port infusing TKO for phos replacement. R PIV x2-SL. Throat pain managed with hurricane spray and lido down NG nare. Pt ambulated in banuelos today x2 and up in chair for most of day. Family at bedside.    Plan: Transfer orders active  For vital signs and complete assessments, please see documentation flowsheets.

## 2025-02-07 NOTE — PLAN OF CARE
Goal Outcome Evaluation:      Plan of Care Reviewed With: patient    Overall Patient Progress: improvingOverall Patient Progress: improving    Oriented, SR, afebrile. RA. L CT with serous/sang. output to water seal. NPO with TF to 20ml/hr via J tube, NG LIS with bile yellow output. Voiding, pass gas, no BM. Midsternal incision and thoracotomy incisions ELVA. Throat pain managed with PRN. Pending transfer.

## 2025-02-07 NOTE — PROGRESS NOTES
THORACIC SURGERY PROGRESS NOTE     S: Intermittent mild nausea, no vomiting, tolerating tube feeds. Pain well controlled. Urinating normally. No BM.     O:  Patient Vitals for the past 24 hrs:   BP Temp Temp src Pulse Resp SpO2 Weight   02/07/25 1200 128/77 98.3  F (36.8  C) Oral -- 20 94 % --   02/07/25 0841 -- -- -- -- -- 93 % --   02/07/25 0800 134/85 98.3  F (36.8  C) Oral 77 17 94 % --   02/07/25 0600 -- -- -- 82 17 94 % --   02/07/25 0500 -- -- -- 81 18 93 % --   02/07/25 0400 121/73 98.2  F (36.8  C) Oral 79 17 95 % --   02/07/25 0300 -- -- -- 86 18 93 % --   02/07/25 0200 -- -- -- 88 18 99 % --   02/07/25 0100 -- -- -- 80 21 96 % --   02/07/25 0000 115/78 98.1  F (36.7  C) Oral 86 20 94 % --   02/06/25 2300 -- -- -- 99 18 96 % --   02/06/25 2200 -- -- -- 91 17 95 % --   02/06/25 2100 -- -- -- 93 23 97 % --   02/06/25 2044 -- -- -- -- -- 94 % --   02/06/25 2000 132/83 99.3  F (37.4  C) Oral 95 19 94 % 96.7 kg (213 lb 3 oz)   02/06/25 1700 -- -- -- 82 19 92 % --   02/06/25 1600 126/82 98.2  F (36.8  C) Oral 91 24 96 % --        Gen: Sitting upright in bed, appears comfortable, no acute distress.  Resp: non labored breathing, chest tube with serosang drainage and no air leak left chest incision c/d/I closed with dermabond  CV: RRR  Abd: Mildly distended, NG and J tubes in place, abdominal incision clean, dry, and intact without drainage or significant surrounding erythema.   Ext: warm and well perfused, no edema, SCDs on     A/P: This is a 35 y.o. male pt w hx Seiwart 3 EJ adenocarcinoma with linitis plastica s/p neoadjuvant chemotherapy and on POD3 from EGD, laparoscopic dissection of the hiatus and mediastinum, laparotomy and left thoracotomy with gastrectomy, proximal esophagectomy, intrathoracic RXY esophagojejunostomy, D2 abdominal lymphadenectomy, jejunostomy tube placement, cryoablation of left intercostal nerves 5-10 and left chest tube placement.     Changes Today  -Enema for postop  "constipation  -Transfer out of ICU when bed available  -Continue tube feedings, advance now that +BM    Pain/neuro : Well controlled on current regimen, has epidural with PCA, pain following  CV: Amiodarone for atrial fibrillation prophy   Resp: Chest tube to water seal, daily CXR. Continue nebs q8h.   GI: Strict NPO. NG to LIS. Benzocaine spray for NG tube irritation. Bisacodyl suppository. TF at 20. Do not advance until passing flatus. Discontinue famotidine.   ID: Pre and intraoperative abx given. Discontinued within 24 hours  Hem: CBC qday,   PPx: Enoxaparin for vte prophy   Renal/FEN: Replace electrolytes PRN  PT/OT/SW: See notes, PT/OT recommend discharge with home cares  [place] [facility] [place], awaiting bed assignment.     Tobias Whitney PA-C  Thoracic & Foregut Surgery    To page Thoracic Surgery team, click here: AMCOM   Choose \"On Call\" tab at top, then use the search box for \"Thoracic\", then select the \"THORACIC SURGERY /[facility]\"      "

## 2025-02-07 NOTE — PROGRESS NOTES
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    Procedure: Laparoscopy, Laparotomy, Left Thoractomy, Total Gastrectomy, Distal Esophagectomy and Omentectomy, Left Chest Tube Placement  MELONY EN Y ESOPHAGOJEJUNOSTOMY, FEEDING JEJUNOSTOMY  Esophagoscopy, gastroscopy, duodenoscopy (EGD), combined    Date of Surgery: 2/3/2025    Date of Catheter Placement: 2/3/2025    Plan/Recommendations:  1. Regional Anesthesia/Analgesia  -Continuous Catheter Type/Site: Thoracic Epidurlal T8-9  Infusate: Hydromorphone 6 mcg/mL, Bupivicaine 0.0625% in sodium chloride 0.9% 250 mL PIB + PCEA    Programmed Intermittent Bolus (PIB) at 7 mL Q60 min via each catheter, total infusion rate of 7 mL/hr    Plan to maintain catheter 5, max of 5 days    2. Anticoagulation  -Please contact Inpatient Pain Service before ordering or making any anticoagulation changes       3. Multimodal Analgesia  - Thoracic Epidurlal T8-9  Infusate: Hydromorphone 6 mcg/mL, Bupivicaine 0.0625% in sodium chloride 0.9% 250 mL PIB + PCEA  - Scheduled Tylenol 975 mg q8h  - Scheduled Robaxin 1000 mg q8h  - Remove Epidural catheter 2/8/25. HOLD Lovenox 2/8/25. Able to resume Lovenox 4 hours after catheter removed.  - Consider pause trial and manage breakthrough pain with Oxycodone 2/8/25    Pain Service will continue to follow.    Discussed with  "attending anesthesiologist    Ardy Fox MD, MPH  Anesthesiology, PGY3, CA2  Inpatient Pain Service   02/07/2025     Overnight Events: NAEO.    Tubes/Drains: Yes      Subjective:  Every now and then I get a little pain, but it not that bad.\"  Nausea: No  Vomiting: No  Pruritus: No  Symptoms of LAST: No    Pain Location:  Incisional pain    Pain Intensity:    Pain at Rest: 2/10   Pain with Activity: 3/10 when getting up to chair  Comfort Goal: 3/10   Baseline Pain: 0/10   Satisfied with your level of pain control: Yes    Diet: NPO for Medical/Clinical Reasons Except for: No Exceptions  Adult Formula Drip Feeding: Continuous Pivot 1.5; Jejunostomy; Goal Rate: 75 ml/hr; 2/7 increase to 30 ml this am, adv by 10ml q 6 hours to goal; mL/hr; Do not advance tube feeding rate unless K+ is = or > 3.0, Mg++ is = or > 1.5, and Phos is = or...    Relevant Labs:  Recent Labs   Lab Test 02/04/25  0512      BUN 9.8       Physical Exam:  Vitals: /85 (BP Location: Right arm, Cuff Size: Adult Regular)   Pulse 77   Temp 98.3  F (36.8  C) (Oral)   Resp 17   Ht 1.88 m (6' 2\")   Wt 96.7 kg (213 lb 3 oz)   SpO2 93%   BMI 27.37 kg/m      Physical Exam:   Orientation:  Alert, oriented, and in no acute distress: Yes  Sedation: No    Motor Examination:  5/5 Strength in lower extremities: Yes    Sensory Level:   Decrease in sensation: Yes    Catheter Site:   Catheter entry site is clean/dry/intact: Yes    Tender: No      Relevant Medications:  Current Pain Medications:  Medications related to Pain Management (From now, onward)      Start     Dose/Rate Route Frequency Ordered Stop    02/04/25 1300  methocarbamol (ROBAXIN) injection 1,000 mg         1,000 mg Intravenous EVERY 8 HOURS 02/04/25 0704 02/05/25 2059 02/04/25 0800  polyethylene glycol (MIRALAX) Packet 17 g         17 g Per Feeding Tube DAILY 02/04/25 0533      02/04/25 0600  senna-docusate (SENOKOT-S/PERICOLACE) 8.6-50 MG per tablet 1 tablet         1 tablet " "Per Feeding Tube AT BEDTIME 02/04/25 0533      02/03/25 2000  acetaminophen (TYLENOL) tablet 975 mg         975 mg Per J Tube EVERY 8 HOURS 02/03/25 1933      02/03/25 0830  HYDROmorphone (DILAUDID) 6 mcg/mL, BUPivacaine (MARCAINE) 0.0625 % in sodium chloride 0.9 % 250 mL PIB + PCEA          EPIDURAL PIB 02/03/25 0801      02/03/25 0756  nalbuphine (NUBAIN) injection 2.5-5 mg         2.5-5 mg Intravenous EVERY 6 HOURS PRN 02/03/25 0801              Primary Service Contacted with Recommendations? Yes      Please see A&P for additional details of medical decision making.  MANAGEMENT DISCUSSED with the following over the past 24 hours: Dr. Abdelrahman Siegel       Acute Inpatient Pain Service [facility]  Hours of pain coverage 24/7   Page via Amcom- Please Page the Pain Team Via Amcom: \"PAIN MANAGEMENT ACUTE INPATIENT/ [facility]\"       Adry Fox MD, MPH  Anesthesiology, PGY3, CA2  Inpatient Pain Service       "

## 2025-02-07 NOTE — PLAN OF CARE
Occupational Therapy Discharge Summary    Reason for therapy discharge:    All goals and outcomes met, no further needs identified.    Progress towards therapy goal(s). See goals on Care Plan in [facility] electronic health record for goal details.  Goals met    Therapy recommendation(s):    No further therapy is recommended.    Goal Outcome Evaluation:

## 2025-02-08 ENCOUNTER — APPOINTMENT (OUTPATIENT)
Dept: GENERAL RADIOLOGY | Facility: CLINIC | Age: 35
End: 2025-02-08
Attending: STUDENT IN AN ORGANIZED HEALTH CARE EDUCATION/TRAINING PROGRAM
Payer: COMMERCIAL

## 2025-02-08 LAB
ANION GAP SERPL CALCULATED.3IONS-SCNC: 16 MMOL/L (ref 7–15)
BUN SERPL-MCNC: 19.1 MG/DL (ref 6–20)
CALCIUM SERPL-MCNC: 8.7 MG/DL (ref 8.8–10.4)
CHLORIDE SERPL-SCNC: 105 MMOL/L (ref 98–107)
CREAT SERPL-MCNC: 0.8 MG/DL (ref 0.67–1.17)
EGFRCR SERPLBLD CKD-EPI 2021: >90 ML/MIN/1.73M2
ERYTHROCYTE [DISTWIDTH] IN BLOOD BY AUTOMATED COUNT: 14.1 % (ref 10–15)
GLUCOSE BLDC GLUCOMTR-MCNC: 133 MG/DL (ref 70–99)
GLUCOSE BLDC GLUCOMTR-MCNC: 133 MG/DL (ref 70–99)
GLUCOSE BLDC GLUCOMTR-MCNC: 134 MG/DL (ref 70–99)
GLUCOSE BLDC GLUCOMTR-MCNC: 137 MG/DL (ref 70–99)
GLUCOSE BLDC GLUCOMTR-MCNC: 144 MG/DL (ref 70–99)
GLUCOSE BLDC GLUCOMTR-MCNC: 153 MG/DL (ref 70–99)
GLUCOSE BLDC GLUCOMTR-MCNC: 156 MG/DL (ref 70–99)
GLUCOSE SERPL-MCNC: 160 MG/DL (ref 70–99)
HCO3 SERPL-SCNC: 20 MMOL/L (ref 22–29)
HCT VFR BLD AUTO: 35.3 % (ref 40–53)
HGB BLD-MCNC: 11.2 G/DL (ref 13.3–17.7)
MAGNESIUM SERPL-MCNC: 2 MG/DL (ref 1.7–2.3)
MCH RBC QN AUTO: 27.5 PG (ref 26.5–33)
MCHC RBC AUTO-ENTMCNC: 31.7 G/DL (ref 31.5–36.5)
MCV RBC AUTO: 87 FL (ref 78–100)
PHOSPHATE SERPL-MCNC: 3.2 MG/DL (ref 2.5–4.5)
PLATELET # BLD AUTO: 289 10E3/UL (ref 150–450)
POTASSIUM SERPL-SCNC: 3.8 MMOL/L (ref 3.4–5.3)
RBC # BLD AUTO: 4.08 10E6/UL (ref 4.4–5.9)
SODIUM SERPL-SCNC: 141 MMOL/L (ref 135–145)
WBC # BLD AUTO: 14 10E3/UL (ref 4–11)

## 2025-02-08 PROCEDURE — 250N000009 HC RX 250

## 2025-02-08 PROCEDURE — 94799 UNLISTED PULMONARY SVC/PX: CPT

## 2025-02-08 PROCEDURE — 250N000011 HC RX IP 250 OP 636: Performed by: STUDENT IN AN ORGANIZED HEALTH CARE EDUCATION/TRAINING PROGRAM

## 2025-02-08 PROCEDURE — 250N000013 HC RX MED GY IP 250 OP 250 PS 637

## 2025-02-08 PROCEDURE — 71045 X-RAY EXAM CHEST 1 VIEW: CPT

## 2025-02-08 PROCEDURE — 83735 ASSAY OF MAGNESIUM: CPT | Performed by: STUDENT IN AN ORGANIZED HEALTH CARE EDUCATION/TRAINING PROGRAM

## 2025-02-08 PROCEDURE — 250N000013 HC RX MED GY IP 250 OP 250 PS 637: Performed by: THORACIC SURGERY (CARDIOTHORACIC VASCULAR SURGERY)

## 2025-02-08 PROCEDURE — 71045 X-RAY EXAM CHEST 1 VIEW: CPT | Mod: 26 | Performed by: STUDENT IN AN ORGANIZED HEALTH CARE EDUCATION/TRAINING PROGRAM

## 2025-02-08 PROCEDURE — 120N000005 HC R&B MS OVERFLOW UMMC

## 2025-02-08 PROCEDURE — 250N000009 HC RX 250: Performed by: THORACIC SURGERY (CARDIOTHORACIC VASCULAR SURGERY)

## 2025-02-08 PROCEDURE — 999N000157 HC STATISTIC RCP TIME EA 10 MIN

## 2025-02-08 PROCEDURE — 80048 BASIC METABOLIC PNL TOTAL CA: CPT | Performed by: STUDENT IN AN ORGANIZED HEALTH CARE EDUCATION/TRAINING PROGRAM

## 2025-02-08 PROCEDURE — 71045 X-RAY EXAM CHEST 1 VIEW: CPT | Mod: 76

## 2025-02-08 PROCEDURE — 85048 AUTOMATED LEUKOCYTE COUNT: CPT | Performed by: STUDENT IN AN ORGANIZED HEALTH CARE EDUCATION/TRAINING PROGRAM

## 2025-02-08 PROCEDURE — 94640 AIRWAY INHALATION TREATMENT: CPT

## 2025-02-08 PROCEDURE — 258N000003 HC RX IP 258 OP 636: Performed by: STUDENT IN AN ORGANIZED HEALTH CARE EDUCATION/TRAINING PROGRAM

## 2025-02-08 PROCEDURE — 84100 ASSAY OF PHOSPHORUS: CPT | Performed by: STUDENT IN AN ORGANIZED HEALTH CARE EDUCATION/TRAINING PROGRAM

## 2025-02-08 PROCEDURE — 85018 HEMOGLOBIN: CPT | Performed by: STUDENT IN AN ORGANIZED HEALTH CARE EDUCATION/TRAINING PROGRAM

## 2025-02-08 PROCEDURE — 94640 AIRWAY INHALATION TREATMENT: CPT | Mod: 76

## 2025-02-08 PROCEDURE — 250N000013 HC RX MED GY IP 250 OP 250 PS 637: Performed by: SURGERY

## 2025-02-08 PROCEDURE — 82374 ASSAY BLOOD CARBON DIOXIDE: CPT | Performed by: STUDENT IN AN ORGANIZED HEALTH CARE EDUCATION/TRAINING PROGRAM

## 2025-02-08 RX ORDER — MAGNESIUM OXIDE 400 MG/1
400 TABLET ORAL EVERY 4 HOURS
Status: COMPLETED | OUTPATIENT
Start: 2025-02-08 | End: 2025-02-08

## 2025-02-08 RX ORDER — POTASSIUM CHLORIDE 1.5 G/1.58G
20 POWDER, FOR SOLUTION ORAL ONCE
Status: COMPLETED | OUTPATIENT
Start: 2025-02-08 | End: 2025-02-08

## 2025-02-08 RX ORDER — OXYCODONE HYDROCHLORIDE 5 MG/1
5-10 TABLET ORAL EVERY 4 HOURS PRN
Status: DISCONTINUED | OUTPATIENT
Start: 2025-02-08 | End: 2025-02-11 | Stop reason: HOSPADM

## 2025-02-08 RX ADMIN — INSULIN ASPART 1 UNITS: 100 INJECTION, SOLUTION INTRAVENOUS; SUBCUTANEOUS at 11:50

## 2025-02-08 RX ADMIN — INSULIN ASPART 1 UNITS: 100 INJECTION, SOLUTION INTRAVENOUS; SUBCUTANEOUS at 16:00

## 2025-02-08 RX ADMIN — Medication 15 ML: at 07:37

## 2025-02-08 RX ADMIN — ACETAMINOPHEN 975 MG: 160 SOLUTION ORAL at 20:27

## 2025-02-08 RX ADMIN — ALBUTEROL SULFATE 2.5 MG: 2.5 SOLUTION RESPIRATORY (INHALATION) at 08:15

## 2025-02-08 RX ADMIN — MAGNESIUM OXIDE TAB 400 MG (241.3 MG ELEMENTAL MG) 400 MG: 400 (241.3 MG) TAB at 09:44

## 2025-02-08 RX ADMIN — TOPICAL ANESTHETIC 0.5 ML: 200 SPRAY DENTAL; PERIODONTAL at 06:46

## 2025-02-08 RX ADMIN — MAGNESIUM OXIDE TAB 400 MG (241.3 MG ELEMENTAL MG) 400 MG: 400 (241.3 MG) TAB at 06:29

## 2025-02-08 RX ADMIN — AMIODARONE HYDROCHLORIDE 200 MG: 200 TABLET ORAL at 20:27

## 2025-02-08 RX ADMIN — AMIODARONE HYDROCHLORIDE 200 MG: 200 TABLET ORAL at 07:37

## 2025-02-08 RX ADMIN — HYDROMORPHONE HYDROCHLORIDE: 1 INJECTION, SOLUTION INTRAMUSCULAR; INTRAVENOUS; SUBCUTANEOUS at 03:29

## 2025-02-08 RX ADMIN — ACETYLCYSTEINE 2 ML: 200 SOLUTION ORAL; RESPIRATORY (INHALATION) at 08:15

## 2025-02-08 RX ADMIN — ALBUTEROL SULFATE 2.5 MG: 2.5 SOLUTION RESPIRATORY (INHALATION) at 21:18

## 2025-02-08 RX ADMIN — INSULIN ASPART 1 UNITS: 100 INJECTION, SOLUTION INTRAVENOUS; SUBCUTANEOUS at 03:43

## 2025-02-08 RX ADMIN — TOPICAL ANESTHETIC 0.5 ML: 200 SPRAY DENTAL; PERIODONTAL at 09:43

## 2025-02-08 RX ADMIN — POTASSIUM CHLORIDE 20 MEQ: 1.5 POWDER, FOR SOLUTION ORAL at 06:29

## 2025-02-08 RX ADMIN — ACETAMINOPHEN 975 MG: 160 SOLUTION ORAL at 03:06

## 2025-02-08 RX ADMIN — ACETAMINOPHEN 975 MG: 160 SOLUTION ORAL at 12:40

## 2025-02-08 RX ADMIN — Medication 3 MG: at 22:36

## 2025-02-08 RX ADMIN — ACETYLCYSTEINE 2 ML: 200 SOLUTION ORAL; RESPIRATORY (INHALATION) at 21:18

## 2025-02-08 RX ADMIN — TOPICAL ANESTHETIC 0.5 ML: 200 SPRAY DENTAL; PERIODONTAL at 00:33

## 2025-02-08 RX ADMIN — TOPICAL ANESTHETIC 0.5 ML: 200 SPRAY DENTAL; PERIODONTAL at 03:09

## 2025-02-08 ASSESSMENT — ACTIVITIES OF DAILY LIVING (ADL)
ADLS_ACUITY_SCORE: 44
ADLS_ACUITY_SCORE: 45
ADLS_ACUITY_SCORE: 45
ADLS_ACUITY_SCORE: 44
ADLS_ACUITY_SCORE: 44
ADLS_ACUITY_SCORE: 45
ADLS_ACUITY_SCORE: 38
ADLS_ACUITY_SCORE: 45
ADLS_ACUITY_SCORE: 37
ADLS_ACUITY_SCORE: 45
ADLS_ACUITY_SCORE: 37
ADLS_ACUITY_SCORE: 37
ADLS_ACUITY_SCORE: 45
ADLS_ACUITY_SCORE: 44
ADLS_ACUITY_SCORE: 45
ADLS_ACUITY_SCORE: 37
ADLS_ACUITY_SCORE: 45
ADLS_ACUITY_SCORE: 44
ADLS_ACUITY_SCORE: 37
ADLS_ACUITY_SCORE: 44
ADLS_ACUITY_SCORE: 37

## 2025-02-08 NOTE — PROGRESS NOTES
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    Procedure: Laparoscopy, Laparotomy, Left Thoractomy, Total Gastrectomy, Distal Esophagectomy and Omentectomy, Left Chest Tube Placement  MELONY EN Y ESOPHAGOJEJUNOSTOMY, FEEDING JEJUNOSTOMY  Esophagoscopy, gastroscopy, duodenoscopy (EGD), combined    Date of Surgery: 2/3/2025    Date of Catheter Placement: 2/3/2025    Plan/Recommendations:  1. Regional Anesthesia/Analgesia  -Continuous Catheter Type/Site: Thoracic Epidurlal T8-9  Infusate: Hydromorphone 6 mcg/mL, Bupivicaine 0.0625% in sodium chloride 0.9% 250 mL PIB + PCEA    Programmed Intermittent Bolus (PIB) at 7 mL Q60 min via each catheter, total infusion rate of 7 mL/hr    - catheter removed this afternoon    2. Anticoagulation  -Please contact Inpatient Pain Service before ordering or making any anticoagulation changes       3. Multimodal Analgesia  - Thoracic Epidurlal T8-9 --> removed; may resume lovenox 4 hours after removal of catheter  Infusate: Hydromorphone 6 mcg/mL, Bupivicaine 0.0625% in sodium chloride 0.9% 250 mL PIB + PCEA  - Scheduled Tylenol 975 mg q8h  - Scheduled Robaxin 1000 mg q8h  - may consider managing breakthrough pain with oxycodone 2/8/25    Pain Service will continue to follow.    Discussed with attending anesthesiologist    Overnight Events:  "JESUO.    Subjective: Comfortable  Nausea: No  Vomiting: No  Pruritus: No  Symptoms of LAST: No    Pain Location:  Incisional pain    Pain Intensity:    Pain at Rest/Activity: patient comfortable overall  Comfort Goal: 0/10  Baseline Pain: 0/10   Satisfied with your level of pain control: Yes    Diet: NPO for Medical/Clinical Reasons Except for: No Exceptions  Adult Formula Drip Feeding: Continuous Pivot 1.5; Jejunostomy; Goal Rate: 75 ml/hr; 2/7 increase to 30 ml this am, adv by 10ml q 6 hours to goal; mL/hr; Do not advance tube feeding rate unless K+ is = or > 3.0, Mg++ is = or > 1.5, and Phos is = or...    Relevant Labs:  Recent Labs   Lab Test 02/04/25  0512      BUN 9.8       Physical Exam:  Vitals: /71 (BP Location: Right arm, Cuff Size: Adult Regular)   Pulse 83   Temp 37.3  C (99.1  F) (Oral)   Resp 21   Ht 1.88 m (6' 2\")   Wt 96.1 kg (211 lb 13.8 oz)   SpO2 96%   BMI 27.20 kg/m      Physical Exam:   Orientation:  Alert, oriented, and in no acute distress  Catheter removed with tip intact; no surrounding edema or purulent drainage      Relevant Medications:  Current Pain Medications:  Medications related to Pain Management (From now, onward)      Start     Dose/Rate Route Frequency Ordered Stop    02/04/25 1300  methocarbamol (ROBAXIN) injection 1,000 mg         1,000 mg Intravenous EVERY 8 HOURS 02/04/25 0704 02/05/25 2059 02/04/25 0800  polyethylene glycol (MIRALAX) Packet 17 g         17 g Per Feeding Tube DAILY 02/04/25 0533 02/04/25 0600  senna-docusate (SENOKOT-S/PERICOLACE) 8.6-50 MG per tablet 1 tablet         1 tablet Per Feeding Tube AT BEDTIME 02/04/25 0533 02/03/25 2000  acetaminophen (TYLENOL) tablet 975 mg         975 mg Per J Tube EVERY 8 HOURS 02/03/25 1933      02/03/25 0830  HYDROmorphone (DILAUDID) 6 mcg/mL, BUPivacaine (MARCAINE) 0.0625 % in sodium chloride 0.9 % 250 mL PIB + PCEA          EPIDURAL PIB 02/03/25 0801      02/03/25 0756  nalbuphine (NUBAIN) " "injection 2.5-5 mg         2.5-5 mg Intravenous EVERY 6 HOURS PRN 02/03/25 0801              Primary Service Contacted with Recommendations? Yes      Acute Inpatient Pain Service [facility]  Hours of pain coverage 24/7   Page via Amcom- Please Page the Pain Team [facility]: \"PAIN MANAGEMENT ACUTE INPATIENT/ [facility]\"       Milana Mckinnon MD  Anesthesiology Resident, CA-2      "

## 2025-02-08 NOTE — PLAN OF CARE
[id number]  Goal Outcome Evaluation:    Overall Patient Progress: improvingOverall Patient Progress: improving    Outcome Evaluation: CT, NG removed in AM. Epidural dc'd. Pain well controlled.    Major Shift Events:  Alert, Ox4. Up Ax1 GB, 5/5 strength. Endorsed mild HA and intermittent nausea, PRN oxy available. 2 laps around unit completed. SR in the 80's, SBP <160 met without intervention. LS clear, moderate amount oral secretions suctioned frequently by patient. No BM this evening, voiding adequately. 2 PIV's removed per pt request d/t pain/discomfort.     Plan: Transfer to  when bed available. Continue to manage pain, increase activity as tolerated.     For vital signs and complete assessments, please see documentation flowsheets.

## 2025-02-08 NOTE — PLAN OF CARE
"Pt complaining of inability to sleep for past 2 days d/t  NG tube . PRN hurricane spray admin according to MAR. Verbally frustrated , feeling acknowledge. Plan to reach MD for sleep aid, adjust tube for patient comfort, and use PRN meds as able. Will continue to monitor and assess.    Problem: Adult Inpatient Plan of Care  Goal: Optimal Comfort and Wellbeing  Outcome: Not Progressing  Intervention: Provide Person-Centered Care  Recent Flowsheet Documentation  Taken 2/7/2025 2000 by Phan Payan RN  Trust Relationship/Rapport:   care explained   choices provided     Problem: Surgery Nonspecified  Goal: Optimal Pain Control and Function  Outcome: Not Progressing     Problem: Adult Inpatient Plan of Care  Goal: Patient-Specific Goal (Individualized)  Description: You can add care plan individualizations to a care plan. Examples of Individualization might be:  \"Parent requests to be called daily at 9am for status\", \"I have a hard time hearing out of my right ear\", or \"Do not touch me to wake me up as it startles  me\".  Outcome: Progressing  Goal: Absence of Hospital-Acquired Illness or Injury  Outcome: Progressing  Intervention: Identify and Manage Fall Risk  Recent Flowsheet Documentation  Taken 2/7/2025 2000 by Phan Payan, RN  Safety Promotion/Fall Prevention:   activity supervised   clutter free environment maintained  Intervention: Prevent Skin Injury  Recent Flowsheet Documentation  Taken 2/7/2025 2200 by Phan Payan, RN  Body Position: position changed independently  Taken 2/7/2025 2000 by Phan Payan, RN  Body Position: position changed independently  Intervention: Prevent and Manage VTE (Venous Thromboembolism) Risk  Recent Flowsheet Documentation  Taken 2/7/2025 2000 by Phan Payan, RN  VTE Prevention/Management: (ambulating 3x/day)   SCDs off (sequential compression devices)   patient refused intervention     Problem: Pain Acute  Goal: Optimal Pain Control and Function  Outcome: " Progressing  Intervention: Optimize Psychosocial Wellbeing  Recent Flowsheet Documentation  Taken 2/7/2025 2000 by Phan Payan RN  Supportive Measures: active listening utilized  Intervention: Prevent or Manage Pain  Recent Flowsheet Documentation  Taken 2/7/2025 2000 by Phan Payan RN  Sensory Stimulation Regulation:   care clustered   lighting decreased   auditory stimulation minimized   television on  Bowel Elimination Promotion:   adequate fluid intake promoted   ambulation promoted  Medication Review/Management: medications reviewed     Problem: Surgery Nonspecified  Goal: Blood Glucose Level Within Targeted Range  Outcome: Progressing  Intervention: Optimize Glycemic Control  Recent Flowsheet Documentation  Taken 2/7/2025 2000 by Phan Payan RN  Hyperglycemia Management: blood glucose monitored  Hypoglycemia Management: blood glucose monitored   Goal Outcome Evaluation: Plan of care reviewed with patient. Patient verbalizes not being comfortable or being able to sleep d/t discomfort with NG. New plan for comfort established as above will continue to monitor and assess

## 2025-02-08 NOTE — PROGRESS NOTES
"THORACIC SURGERY PROGRESS NOTE     S: Intermittent mild nausea, no vomiting, tolerating tube feeds. Pain well controlled. Urinating normally. Having Bms. Reports the NG tube is \"unbearable\"     O:  Patient Vitals for the past 24 hrs:   BP Temp Temp src Pulse Resp SpO2 Weight   02/08/25 0800 139/82 -- -- 90 23 93 % --   02/08/25 0700 -- 98.2  F (36.8  C) Axillary 86 25 92 % --   02/08/25 0600 -- -- -- 77 17 (!) 91 % --   02/08/25 0500 -- -- -- 79 23 93 % --   02/08/25 0400 132/69 98.5  F (36.9  C) Axillary 91 21 93 % --   02/08/25 0300 -- -- -- 84 21 93 % --   02/08/25 0200 -- -- -- 89 25 95 % --   02/08/25 0100 -- -- -- 82 22 93 % --   02/08/25 0000 122/65 99  F (37.2  C) Axillary 73 18 95 % 96.1 kg (211 lb 13.8 oz)   02/07/25 2300 -- -- -- 74 17 (!) 91 % --   02/07/25 2200 -- -- -- 91 23 92 % --   02/07/25 2006 -- -- -- -- -- 95 % --   02/07/25 2000 124/70 99.5  F (37.5  C) Oral 78 21 93 % --   02/07/25 1600 (!) 132/91 98.6  F (37  C) Oral 82 21 96 % --   02/07/25 1200 128/77 98.3  F (36.8  C) Oral -- 20 94 % --        Gen: Sitting upright in bed, appears comfortable, no acute distress.  Resp: non labored breathing, chest tube with serosang drainage and no air leak left chest incision c/d/I closed with dermabond  CV: RRR  Abd: Mildly distended, NG and J tubes in place, abdominal incision clean, dry, and intact without drainage or significant surrounding erythema.   Ext: warm and well perfused, no edema, SCDs on     A/P: This is a 35 y.o. male pt w hx Seiwart 3 EJ adenocarcinoma with linitis plastica s/p neoadjuvant chemotherapy and on POD3 from EGD, laparoscopic dissection of the hiatus and mediastinum, laparotomy and left thoracotomy with gastrectomy, proximal esophagectomy, intrathoracic RXY esophagojejunostomy, D2 abdominal lymphadenectomy, jejunostomy tube placement, cryoablation of left intercostal nerves 5-10 and left chest tube placement.     Changes Today  -Transfer out of ICU when bed available  -Continue " "tube feedings, will reach goal feeds today  - Remove epidural today per pain (lovenox held)  - Remove CT and NG tubes today    Pain/neuro : Well controlled on current regimen, has epidural with PCA, pain following  CV: Amiodarone for atrial fibrillation prophy   Resp: Chest tube removed, follow up CXR   GI: Strict NPO. NG removed.  Bisacodyl suppository. TF advancing to goal, will cycle when at goal  ID: Pre and intraoperative abx given. Discontinued within 24 hours  Hem: CBC qday,   PPx: Enoxaparin for vte prophy   Renal/FEN: Replace electrolytes PRN  PT/OT/SW: See notes, PT/OT recommend discharge with home cares  Dispo: To [facility], order placed, awaiting bed assignment. Discharge from hospital in ~48 hours     Misbah Lawrence MD  [place] Resident, General Surgery  Thoracic Surgery Service     To page Thoracic Surgery team, click here: AMCOM   Choose \"On Call\" tab at top, then use the search box for \"Thoracic\", then select the \"THORACIC SURGERY /[facility]\"      "

## 2025-02-09 ENCOUNTER — APPOINTMENT (OUTPATIENT)
Dept: GENERAL RADIOLOGY | Facility: CLINIC | Age: 35
End: 2025-02-09
Attending: STUDENT IN AN ORGANIZED HEALTH CARE EDUCATION/TRAINING PROGRAM
Payer: COMMERCIAL

## 2025-02-09 LAB
ANION GAP SERPL CALCULATED.3IONS-SCNC: 13 MMOL/L (ref 7–15)
BUN SERPL-MCNC: 26.2 MG/DL (ref 6–20)
CALCIUM SERPL-MCNC: 9.2 MG/DL (ref 8.8–10.4)
CHLORIDE SERPL-SCNC: 108 MMOL/L (ref 98–107)
CREAT SERPL-MCNC: 0.81 MG/DL (ref 0.67–1.17)
EGFRCR SERPLBLD CKD-EPI 2021: >90 ML/MIN/1.73M2
ERYTHROCYTE [DISTWIDTH] IN BLOOD BY AUTOMATED COUNT: 14.2 % (ref 10–15)
GLUCOSE BLDC GLUCOMTR-MCNC: 125 MG/DL (ref 70–99)
GLUCOSE BLDC GLUCOMTR-MCNC: 135 MG/DL (ref 70–99)
GLUCOSE BLDC GLUCOMTR-MCNC: 154 MG/DL (ref 70–99)
GLUCOSE BLDC GLUCOMTR-MCNC: 158 MG/DL (ref 70–99)
GLUCOSE BLDC GLUCOMTR-MCNC: 166 MG/DL (ref 70–99)
GLUCOSE BLDC GLUCOMTR-MCNC: 173 MG/DL (ref 70–99)
GLUCOSE SERPL-MCNC: 140 MG/DL (ref 70–99)
HCO3 SERPL-SCNC: 22 MMOL/L (ref 22–29)
HCT VFR BLD AUTO: 35.8 % (ref 40–53)
HGB BLD-MCNC: 11.4 G/DL (ref 13.3–17.7)
MAGNESIUM SERPL-MCNC: 2.2 MG/DL (ref 1.7–2.3)
MCH RBC QN AUTO: 27.7 PG (ref 26.5–33)
MCHC RBC AUTO-ENTMCNC: 31.8 G/DL (ref 31.5–36.5)
MCV RBC AUTO: 87 FL (ref 78–100)
PHOSPHATE SERPL-MCNC: 3.1 MG/DL (ref 2.5–4.5)
PLATELET # BLD AUTO: 331 10E3/UL (ref 150–450)
POTASSIUM SERPL-SCNC: 4.2 MMOL/L (ref 3.4–5.3)
RBC # BLD AUTO: 4.11 10E6/UL (ref 4.4–5.9)
SODIUM SERPL-SCNC: 143 MMOL/L (ref 135–145)
WBC # BLD AUTO: 17.2 10E3/UL (ref 4–11)

## 2025-02-09 PROCEDURE — 250N000013 HC RX MED GY IP 250 OP 250 PS 637: Performed by: STUDENT IN AN ORGANIZED HEALTH CARE EDUCATION/TRAINING PROGRAM

## 2025-02-09 PROCEDURE — 250N000013 HC RX MED GY IP 250 OP 250 PS 637

## 2025-02-09 PROCEDURE — 84100 ASSAY OF PHOSPHORUS: CPT | Performed by: STUDENT IN AN ORGANIZED HEALTH CARE EDUCATION/TRAINING PROGRAM

## 2025-02-09 PROCEDURE — 120N000003 HC R&B IMCU UMMC

## 2025-02-09 PROCEDURE — 250N000013 HC RX MED GY IP 250 OP 250 PS 637: Performed by: SURGERY

## 2025-02-09 PROCEDURE — 250N000013 HC RX MED GY IP 250 OP 250 PS 637: Performed by: THORACIC SURGERY (CARDIOTHORACIC VASCULAR SURGERY)

## 2025-02-09 PROCEDURE — 83735 ASSAY OF MAGNESIUM: CPT | Performed by: STUDENT IN AN ORGANIZED HEALTH CARE EDUCATION/TRAINING PROGRAM

## 2025-02-09 PROCEDURE — 80048 BASIC METABOLIC PNL TOTAL CA: CPT | Performed by: STUDENT IN AN ORGANIZED HEALTH CARE EDUCATION/TRAINING PROGRAM

## 2025-02-09 PROCEDURE — 999N000157 HC STATISTIC RCP TIME EA 10 MIN

## 2025-02-09 PROCEDURE — 94640 AIRWAY INHALATION TREATMENT: CPT

## 2025-02-09 PROCEDURE — 71045 X-RAY EXAM CHEST 1 VIEW: CPT

## 2025-02-09 PROCEDURE — 250N000009 HC RX 250: Performed by: THORACIC SURGERY (CARDIOTHORACIC VASCULAR SURGERY)

## 2025-02-09 PROCEDURE — 71045 X-RAY EXAM CHEST 1 VIEW: CPT | Mod: 26 | Performed by: RADIOLOGY

## 2025-02-09 PROCEDURE — 250N000011 HC RX IP 250 OP 636: Performed by: STUDENT IN AN ORGANIZED HEALTH CARE EDUCATION/TRAINING PROGRAM

## 2025-02-09 PROCEDURE — 85048 AUTOMATED LEUKOCYTE COUNT: CPT | Performed by: STUDENT IN AN ORGANIZED HEALTH CARE EDUCATION/TRAINING PROGRAM

## 2025-02-09 PROCEDURE — 250N000011 HC RX IP 250 OP 636

## 2025-02-09 PROCEDURE — 82374 ASSAY BLOOD CARBON DIOXIDE: CPT | Performed by: STUDENT IN AN ORGANIZED HEALTH CARE EDUCATION/TRAINING PROGRAM

## 2025-02-09 PROCEDURE — 94640 AIRWAY INHALATION TREATMENT: CPT | Mod: 76

## 2025-02-09 PROCEDURE — 85018 HEMOGLOBIN: CPT | Performed by: STUDENT IN AN ORGANIZED HEALTH CARE EDUCATION/TRAINING PROGRAM

## 2025-02-09 RX ORDER — FLUCONAZOLE 2 MG/ML
400 INJECTION, SOLUTION INTRAVENOUS EVERY 24 HOURS
Status: DISCONTINUED | OUTPATIENT
Start: 2025-02-10 | End: 2025-02-11 | Stop reason: HOSPADM

## 2025-02-09 RX ORDER — IRRIGATION SET
800 IRRIGATION SET MISCELLANEOUS ONCE
Status: COMPLETED | OUTPATIENT
Start: 2025-02-09 | End: 2025-02-09

## 2025-02-09 RX ORDER — CYANOCOBALAMIN 1000 UG/ML
1000 INJECTION, SOLUTION INTRAMUSCULAR; SUBCUTANEOUS
Status: DISCONTINUED | OUTPATIENT
Start: 2025-02-09 | End: 2025-02-11 | Stop reason: HOSPADM

## 2025-02-09 RX ORDER — PIPERACILLIN SODIUM, TAZOBACTAM SODIUM 4; .5 G/20ML; G/20ML
4.5 INJECTION, POWDER, LYOPHILIZED, FOR SOLUTION INTRAVENOUS EVERY 6 HOURS
Status: DISCONTINUED | OUTPATIENT
Start: 2025-02-09 | End: 2025-02-10

## 2025-02-09 RX ORDER — HYDROXYZINE HYDROCHLORIDE 25 MG/1
25 TABLET, FILM COATED ORAL ONCE
Status: COMPLETED | OUTPATIENT
Start: 2025-02-09 | End: 2025-02-09

## 2025-02-09 RX ADMIN — ACETAMINOPHEN 975 MG: 160 SOLUTION ORAL at 13:04

## 2025-02-09 RX ADMIN — INSULIN ASPART 1 UNITS: 100 INJECTION, SOLUTION INTRAVENOUS; SUBCUTANEOUS at 20:24

## 2025-02-09 RX ADMIN — HYDROXYZINE HYDROCHLORIDE 25 MG: 25 TABLET, FILM COATED ORAL at 02:35

## 2025-02-09 RX ADMIN — ACETYLCYSTEINE 2 ML: 200 SOLUTION ORAL; RESPIRATORY (INHALATION) at 08:52

## 2025-02-09 RX ADMIN — AMIODARONE HYDROCHLORIDE 200 MG: 200 TABLET ORAL at 08:28

## 2025-02-09 RX ADMIN — AMIODARONE HYDROCHLORIDE 200 MG: 200 TABLET ORAL at 20:18

## 2025-02-09 RX ADMIN — INSULIN ASPART 1 UNITS: 100 INJECTION, SOLUTION INTRAVENOUS; SUBCUTANEOUS at 08:43

## 2025-02-09 RX ADMIN — OXYCODONE HYDROCHLORIDE 5 MG: 5 TABLET ORAL at 02:24

## 2025-02-09 RX ADMIN — PIPERACILLIN AND TAZOBACTAM 4.5 G: 4; .5 INJECTION, POWDER, FOR SOLUTION INTRAVENOUS at 11:09

## 2025-02-09 RX ADMIN — ACETAMINOPHEN 975 MG: 160 SOLUTION ORAL at 20:17

## 2025-02-09 RX ADMIN — PIPERACILLIN AND TAZOBACTAM 4.5 G: 4; .5 INJECTION, POWDER, FOR SOLUTION INTRAVENOUS at 22:13

## 2025-02-09 RX ADMIN — ALBUTEROL SULFATE 2.5 MG: 2.5 SOLUTION RESPIRATORY (INHALATION) at 20:00

## 2025-02-09 RX ADMIN — FLUCONAZOLE 800 MG: 400 INJECTION, SOLUTION INTRAVENOUS at 13:02

## 2025-02-09 RX ADMIN — Medication 3 MG: at 23:21

## 2025-02-09 RX ADMIN — ACETYLCYSTEINE 2 ML: 200 SOLUTION ORAL; RESPIRATORY (INHALATION) at 20:00

## 2025-02-09 RX ADMIN — PIPERACILLIN AND TAZOBACTAM 4.5 G: 4; .5 INJECTION, POWDER, FOR SOLUTION INTRAVENOUS at 17:09

## 2025-02-09 RX ADMIN — INSULIN ASPART 1 UNITS: 100 INJECTION, SOLUTION INTRAVENOUS; SUBCUTANEOUS at 13:15

## 2025-02-09 RX ADMIN — SENNOSIDES 5 ML: 8.8 LIQUID ORAL at 01:01

## 2025-02-09 RX ADMIN — ACETAMINOPHEN 975 MG: 160 SOLUTION ORAL at 04:46

## 2025-02-09 RX ADMIN — CYANOCOBALAMIN 1000 MCG: 1000 INJECTION, SOLUTION INTRAMUSCULAR at 17:09

## 2025-02-09 RX ADMIN — ENOXAPARIN SODIUM 40 MG: 40 INJECTION SUBCUTANEOUS at 13:07

## 2025-02-09 RX ADMIN — Medication 15 ML: at 08:28

## 2025-02-09 RX ADMIN — ALBUTEROL SULFATE 2.5 MG: 2.5 SOLUTION RESPIRATORY (INHALATION) at 08:52

## 2025-02-09 RX ADMIN — INSULIN ASPART 1 UNITS: 100 INJECTION, SOLUTION INTRAVENOUS; SUBCUTANEOUS at 23:25

## 2025-02-09 RX ADMIN — POLYETHYLENE GLYCOL 3350 17 G: 17 POWDER, FOR SOLUTION ORAL at 08:29

## 2025-02-09 RX ADMIN — SENNOSIDES 5 ML: 8.8 LIQUID ORAL at 20:16

## 2025-02-09 ASSESSMENT — ACTIVITIES OF DAILY LIVING (ADL)
ADLS_ACUITY_SCORE: 36

## 2025-02-09 NOTE — PROGRESS NOTES
THORACIC SURGERY PROGRESS NOTE     S: Reports his pain is well controlled. Happy to have tubes removed. Tolerating feeds at goal. Urinating, stooling, and ambulating appropriately      O:  Patient Vitals for the past 24 hrs:   BP Temp Temp src Pulse Resp SpO2   02/09/25 0400 130/71 99.2  F (37.3  C) Oral 82 16 93 %   02/09/25 0000 116/69 98.2  F (36.8  C) Oral 77 16 93 %   02/08/25 2118 -- -- -- 92 -- 94 %   02/08/25 2000 -- 98.6  F (37  C) Oral 97 22 93 %   02/08/25 1800 -- -- -- 96 23 94 %   02/08/25 1700 -- -- -- 89 24 95 %   02/08/25 1600 128/71 99.1  F (37.3  C) Oral 83 21 96 %   02/08/25 1500 -- -- -- 75 19 93 %   02/08/25 1400 -- -- -- 80 19 93 %   02/08/25 1300 -- -- -- 85 21 93 %   02/08/25 1200 -- -- -- 92 20 92 %   02/08/25 1150 (!) 144/83 98.3  F (36.8  C) Oral 94 22 94 %   02/08/25 1100 -- -- -- 77 22 (!) 91 %   02/08/25 1000 -- -- -- 85 23 95 %   02/08/25 0900 -- -- -- 92 24 (!) 91 %   02/08/25 0800 139/82 -- -- 90 23 93 %        Gen: Lying in bed, appears comfortable, no acute distress.  Resp: non labored breathing on RA left chest incision c/d/I closed with dermabond  CV: RRR  Abd: Mildly distended, J tube in place, abdominal incision clean, dry, and intact without drainage or significant surrounding erythema.   Ext: warm and well perfused, no edema, SCDs on     A/P: This is a 35 y.o. male pt w hx Seiwart 3 EJ adenocarcinoma with linitis plastica s/p neoadjuvant chemotherapy and on POD3 from EGD, laparoscopic dissection of the hiatus and mediastinum, laparotomy and left thoracotomy with gastrectomy, proximal esophagectomy, intrathoracic RXY esophagojejunostomy, D2 abdominal lymphadenectomy, jejunostomy tube placement, cryoablation of left intercostal nerves 5-10 and left chest tube placement.     Changes Today  -Transfer out of ICU when bed available  - Cycle TF  - Oral pain meds  - likely dispo tomorrow    Pain/neuro : Well controlled on current regimen  CV: Amiodarone for atrial fibrillation prophy  "  Resp: Chest tube removed  GI: Strict NPO. NG removed.  Bisacodyl suppository PRN. Cycle TF today  ID: Pre and intraoperative abx given. Discontinued within 24 hours. Zosyn and diflucan for increasing WBC  Hem: CBC qday,   PPx: Enoxaparin for vte prophy, will discharge with lovenox  Renal/FEN: Replace electrolytes PRN  PT/OT/SW: See notes, PT/OT recommend discharge with home cares  Dispo: To [facility], order placed, awaiting bed assignment. Discharge from hospital in ~24 hours     Seen and examined with fellow, Dr. Pettit who discussed with staff, Dr. zenon Lawrence MD  [place] Resident, General Surgery  Thoracic Surgery Service     To page Thoracic Surgery team, click here: AMCOM   Choose \"On Call\" tab at top, then use the search box for \"Thoracic\", then select the \"THORACIC SURGERY /[facility]\"      "

## 2025-02-09 NOTE — PLAN OF CARE
Goal Outcome Evaluation:      Plan of Care Reviewed With: patient, family          Outcome Evaluation: Restarted IV antibiotics for increasing leukocytosis    Alert and oriented. States adequate pain control. VSS. Afebrile. Tolerates tube feeds well. No BM, requests full doses of laxatives. Did patient teaching for self injection of enoxaparin and liquid meds down the j-tube. Will start cycled feeds tonight. Pt expresses frustration with length of stay. Anticipate discharge to home tomorrow.

## 2025-02-09 NOTE — PROGRESS NOTES
"CLINICAL NUTRITION SERVICES - BRIEF NOTE     Reason for RD note: Provider order for Cycled EN    New Findings/Chart Review:  Nutrition support via [employer] (reached goal 2/8 @1000): Pivot 1.5 Arturo (or equivalent)  @ goal of  75ml/hr  (1800ml/day) provides: 2700 kcals (27 kcal/kg), 168 g PRO (1.7 g pro/kg), 1350 ml free H20, 310 g CHO, and 13 g fiber daily.     Pt visited, groggy and reporting he wants to go home    Labs and meds reviewed    1-2 BM's daily since 2/7 after several days without a BM.  At risk for osmotic diarrhea with liquid tylenol (hyperosmolar)    Weight down from admit.  Reassessed needs with updated dosing weight and lowered protein needs slightly as pt prepares for discharge:    Dosing Weight (NEW 2/9): 96 kg, based on actual wt    UPDATED ASSESSED NUTRITION NEEDS   Estimated Energy Needs: 2400 - 2880+ kcals/day (25 - 30+ kcals/kg)  Justification: Maintenance  Estimated Protein Needs: 115 - 144 grams protein/day (1.2 - 1.5 g/kg)  Justification: Increased needs post op  Estimated Fluid Needs: 1 mL/kcal  Justification: Maintenance and Per provider pending fluid status    Interventions:  - Reassessed dosing weight and estimated needs per above  - Change EN to concentrated formula and cycle: TwoCal HN (or equivalent) @ 100 ml/hr x 14 hours (6p-8a) to provide: 1400 ml, 2800 kcals (29 kcal/kg), 117 g protein (1.2g/kg), 306 g CHO, 980 ml free water, and 7 g fiber.  ON discharge,  ensure additional 1800 ml free water per day to provide 2780 ml to meet hydration needs.  - Discussed EN changes with patient and wrote discharge instructions including recommendations for free water flushes  - Discontinued ceravite as goal EN meets 100% RDI  - Collaboration with providers - SICU re: B12 monthly as pt s/p total gastrectomy    Nutrition will continue to follow per protocol.    Riya Whitmore, RD, LD, CNSC  \"4E Clinical Dietitian\" on Vocera  Weekend/Holiday RD - \"Weekend Clinical Dietitian\" on vocera    "

## 2025-02-09 NOTE — DISCHARGE INSTRUCTIONS
Home Tube Feeding:    TwoCal HN (or equivalent) @ 100 ml/hr x 14 hours (6p-8a) to provide: 1400 ml, 2800 kcals (29 kcal/kg), 117 g protein (1.2g/kg), 306 g CHO, 980 ml free water, and 7 g fiber.  ON discharge,  ensure additional 1800 ml free water per day to provide 2780 ml to meet hydration needs.    Monitor weight / need to increase tube feeding volume if weight loss occurs

## 2025-02-09 NOTE — PLAN OF CARE
Major Shift Events: VSS on RA. Neuro intact, up SBA. Oxycodone and atarax x1 for pain. TF at goal 75 via J tube. Voiding spont. Labs WNL.   Plan: Possible discharge Monday 2/9  For vital signs and complete assessments, please see documentation flowsheets.

## 2025-02-10 ENCOUNTER — APPOINTMENT (OUTPATIENT)
Dept: CT IMAGING | Facility: CLINIC | Age: 35
End: 2025-02-10
Attending: PHYSICIAN ASSISTANT
Payer: COMMERCIAL

## 2025-02-10 ENCOUNTER — APPOINTMENT (OUTPATIENT)
Dept: PHYSICAL THERAPY | Facility: CLINIC | Age: 35
DRG: 327 | End: 2025-02-10
Attending: THORACIC SURGERY (CARDIOTHORACIC VASCULAR SURGERY)
Payer: COMMERCIAL

## 2025-02-10 ENCOUNTER — ENROLLMENT (OUTPATIENT)
Dept: HOME HEALTH SERVICES | Facility: HOME HEALTH | Age: 35
End: 2025-02-10
Payer: COMMERCIAL

## 2025-02-10 ENCOUNTER — APPOINTMENT (OUTPATIENT)
Dept: GENERAL RADIOLOGY | Facility: CLINIC | Age: 35
End: 2025-02-10
Attending: STUDENT IN AN ORGANIZED HEALTH CARE EDUCATION/TRAINING PROGRAM
Payer: COMMERCIAL

## 2025-02-10 LAB
ANION GAP SERPL CALCULATED.3IONS-SCNC: 12 MMOL/L (ref 7–15)
BUN SERPL-MCNC: 24.5 MG/DL (ref 6–20)
CALCIUM SERPL-MCNC: 9 MG/DL (ref 8.8–10.4)
CHLORIDE SERPL-SCNC: 108 MMOL/L (ref 98–107)
CREAT SERPL-MCNC: 0.77 MG/DL (ref 0.67–1.17)
EGFRCR SERPLBLD CKD-EPI 2021: >90 ML/MIN/1.73M2
ERYTHROCYTE [DISTWIDTH] IN BLOOD BY AUTOMATED COUNT: 14.5 % (ref 10–15)
GLUCOSE BLDC GLUCOMTR-MCNC: 131 MG/DL (ref 70–99)
GLUCOSE BLDC GLUCOMTR-MCNC: 137 MG/DL (ref 70–99)
GLUCOSE BLDC GLUCOMTR-MCNC: 146 MG/DL (ref 70–99)
GLUCOSE BLDC GLUCOMTR-MCNC: 148 MG/DL (ref 70–99)
GLUCOSE BLDC GLUCOMTR-MCNC: 160 MG/DL (ref 70–99)
GLUCOSE BLDC GLUCOMTR-MCNC: 191 MG/DL (ref 70–99)
GLUCOSE SERPL-MCNC: 130 MG/DL (ref 70–99)
HCO3 SERPL-SCNC: 21 MMOL/L (ref 22–29)
HCT VFR BLD AUTO: 36.4 % (ref 40–53)
HGB BLD-MCNC: 11.7 G/DL (ref 13.3–17.7)
MAGNESIUM SERPL-MCNC: 2.3 MG/DL (ref 1.7–2.3)
MCH RBC QN AUTO: 28 PG (ref 26.5–33)
MCHC RBC AUTO-ENTMCNC: 32.1 G/DL (ref 31.5–36.5)
MCV RBC AUTO: 87 FL (ref 78–100)
PHOSPHATE SERPL-MCNC: 3.4 MG/DL (ref 2.5–4.5)
PLATELET # BLD AUTO: 388 10E3/UL (ref 150–450)
POTASSIUM SERPL-SCNC: 4.4 MMOL/L (ref 3.4–5.3)
RBC # BLD AUTO: 4.18 10E6/UL (ref 4.4–5.9)
SODIUM SERPL-SCNC: 141 MMOL/L (ref 135–145)
WBC # BLD AUTO: 18.9 10E3/UL (ref 4–11)

## 2025-02-10 PROCEDURE — 250N000013 HC RX MED GY IP 250 OP 250 PS 637

## 2025-02-10 PROCEDURE — 250N000013 HC RX MED GY IP 250 OP 250 PS 637: Performed by: THORACIC SURGERY (CARDIOTHORACIC VASCULAR SURGERY)

## 2025-02-10 PROCEDURE — 83735 ASSAY OF MAGNESIUM: CPT | Performed by: STUDENT IN AN ORGANIZED HEALTH CARE EDUCATION/TRAINING PROGRAM

## 2025-02-10 PROCEDURE — 71045 X-RAY EXAM CHEST 1 VIEW: CPT

## 2025-02-10 PROCEDURE — 120N000005 HC R&B MS OVERFLOW UMMC

## 2025-02-10 PROCEDURE — 80048 BASIC METABOLIC PNL TOTAL CA: CPT | Performed by: STUDENT IN AN ORGANIZED HEALTH CARE EDUCATION/TRAINING PROGRAM

## 2025-02-10 PROCEDURE — 85048 AUTOMATED LEUKOCYTE COUNT: CPT | Performed by: STUDENT IN AN ORGANIZED HEALTH CARE EDUCATION/TRAINING PROGRAM

## 2025-02-10 PROCEDURE — 250N000011 HC RX IP 250 OP 636: Performed by: STUDENT IN AN ORGANIZED HEALTH CARE EDUCATION/TRAINING PROGRAM

## 2025-02-10 PROCEDURE — 74177 CT ABD & PELVIS W/CONTRAST: CPT

## 2025-02-10 PROCEDURE — 74177 CT ABD & PELVIS W/CONTRAST: CPT | Mod: 26 | Performed by: RADIOLOGY

## 2025-02-10 PROCEDURE — 94799 UNLISTED PULMONARY SVC/PX: CPT

## 2025-02-10 PROCEDURE — 85014 HEMATOCRIT: CPT | Performed by: STUDENT IN AN ORGANIZED HEALTH CARE EDUCATION/TRAINING PROGRAM

## 2025-02-10 PROCEDURE — 250N000011 HC RX IP 250 OP 636: Performed by: PHYSICIAN ASSISTANT

## 2025-02-10 PROCEDURE — 94640 AIRWAY INHALATION TREATMENT: CPT

## 2025-02-10 PROCEDURE — 71045 X-RAY EXAM CHEST 1 VIEW: CPT | Mod: 26 | Performed by: RADIOLOGY

## 2025-02-10 PROCEDURE — 71260 CT THORAX DX C+: CPT | Mod: 26 | Performed by: RADIOLOGY

## 2025-02-10 PROCEDURE — 250N000013 HC RX MED GY IP 250 OP 250 PS 637: Performed by: PHYSICIAN ASSISTANT

## 2025-02-10 PROCEDURE — 94640 AIRWAY INHALATION TREATMENT: CPT | Mod: 76

## 2025-02-10 PROCEDURE — 97530 THERAPEUTIC ACTIVITIES: CPT | Mod: GP | Performed by: PHYSICAL THERAPIST

## 2025-02-10 PROCEDURE — 84100 ASSAY OF PHOSPHORUS: CPT | Performed by: STUDENT IN AN ORGANIZED HEALTH CARE EDUCATION/TRAINING PROGRAM

## 2025-02-10 PROCEDURE — 250N000011 HC RX IP 250 OP 636: Performed by: THORACIC SURGERY (CARDIOTHORACIC VASCULAR SURGERY)

## 2025-02-10 PROCEDURE — 250N000009 HC RX 250: Performed by: THORACIC SURGERY (CARDIOTHORACIC VASCULAR SURGERY)

## 2025-02-10 PROCEDURE — 999N000157 HC STATISTIC RCP TIME EA 10 MIN

## 2025-02-10 RX ORDER — IOPAMIDOL 755 MG/ML
134 INJECTION, SOLUTION INTRAVASCULAR ONCE
Status: COMPLETED | OUTPATIENT
Start: 2025-02-10 | End: 2025-02-10

## 2025-02-10 RX ORDER — CYANOCOBALAMIN 1000 UG/ML
1000 INJECTION, SOLUTION INTRAMUSCULAR; SUBCUTANEOUS
COMMUNITY
Start: 2025-03-11

## 2025-02-10 RX ORDER — ACETAMINOPHEN 325 MG/10.15ML
975 LIQUID ORAL EVERY 8 HOURS
Qty: 500 ML | Refills: 0 | Status: SHIPPED | OUTPATIENT
Start: 2025-02-10

## 2025-02-10 RX ORDER — FLUCONAZOLE 40 MG/ML
400 POWDER, FOR SUSPENSION ORAL DAILY
Qty: 70 ML | Refills: 0 | Status: SHIPPED | OUTPATIENT
Start: 2025-02-10 | End: 2025-02-17

## 2025-02-10 RX ORDER — AMOXICILLIN AND CLAVULANATE POTASSIUM 400; 57 MG/5ML; MG/5ML
875 POWDER, FOR SUSPENSION ORAL 2 TIMES DAILY
Qty: 153.16 ML | Refills: 0 | Status: SHIPPED | OUTPATIENT
Start: 2025-02-10 | End: 2025-02-17

## 2025-02-10 RX ORDER — AMOXICILLIN AND CLAVULANATE POTASSIUM 400; 57 MG/5ML; MG/5ML
875 POWDER, FOR SUSPENSION ORAL 2 TIMES DAILY
Status: DISCONTINUED | OUTPATIENT
Start: 2025-02-10 | End: 2025-02-11 | Stop reason: HOSPADM

## 2025-02-10 RX ORDER — ENOXAPARIN SODIUM 100 MG/ML
40 INJECTION SUBCUTANEOUS EVERY 24 HOURS
Qty: 9.2 ML | Refills: 0 | Status: SHIPPED | OUTPATIENT
Start: 2025-02-11 | End: 2025-03-06

## 2025-02-10 RX ORDER — OXYCODONE HCL 5 MG/5 ML
5 SOLUTION, ORAL ORAL EVERY 6 HOURS PRN
Qty: 250 ML | Refills: 0 | Status: SHIPPED | OUTPATIENT
Start: 2025-02-10 | End: 2025-02-23

## 2025-02-10 RX ADMIN — FLUCONAZOLE 400 MG: 2 INJECTION, SOLUTION INTRAVENOUS at 12:47

## 2025-02-10 RX ADMIN — PIPERACILLIN AND TAZOBACTAM 4.5 G: 4; .5 INJECTION, POWDER, FOR SOLUTION INTRAVENOUS at 10:45

## 2025-02-10 RX ADMIN — INSULIN ASPART 1 UNITS: 100 INJECTION, SOLUTION INTRAVENOUS; SUBCUTANEOUS at 08:59

## 2025-02-10 RX ADMIN — ENOXAPARIN SODIUM 40 MG: 40 INJECTION SUBCUTANEOUS at 12:47

## 2025-02-10 RX ADMIN — ACETYLCYSTEINE 2 ML: 200 SOLUTION ORAL; RESPIRATORY (INHALATION) at 20:13

## 2025-02-10 RX ADMIN — ALBUTEROL SULFATE 2.5 MG: 2.5 SOLUTION RESPIRATORY (INHALATION) at 20:13

## 2025-02-10 RX ADMIN — PIPERACILLIN AND TAZOBACTAM 4.5 G: 4; .5 INJECTION, POWDER, FOR SOLUTION INTRAVENOUS at 04:27

## 2025-02-10 RX ADMIN — ACETAMINOPHEN 975 MG: 160 SOLUTION ORAL at 12:47

## 2025-02-10 RX ADMIN — IOPAMIDOL 134 ML: 755 INJECTION, SOLUTION INTRAVENOUS at 12:29

## 2025-02-10 RX ADMIN — AMIODARONE HYDROCHLORIDE 200 MG: 200 TABLET ORAL at 08:55

## 2025-02-10 RX ADMIN — ACETAMINOPHEN 975 MG: 160 SOLUTION ORAL at 20:12

## 2025-02-10 RX ADMIN — ACETYLCYSTEINE 2 ML: 200 SOLUTION ORAL; RESPIRATORY (INHALATION) at 08:11

## 2025-02-10 RX ADMIN — INSULIN ASPART 1 UNITS: 100 INJECTION, SOLUTION INTRAVENOUS; SUBCUTANEOUS at 23:38

## 2025-02-10 RX ADMIN — OXYCODONE HYDROCHLORIDE 5 MG: 5 TABLET ORAL at 01:31

## 2025-02-10 RX ADMIN — AMOXICILLIN AND CLAVULANATE POTASSIUM 875 MG: 400; 57 POWDER, FOR SUSPENSION ORAL at 17:58

## 2025-02-10 RX ADMIN — ALBUTEROL SULFATE 2.5 MG: 2.5 SOLUTION RESPIRATORY (INHALATION) at 08:11

## 2025-02-10 RX ADMIN — INSULIN ASPART 2 UNITS: 100 INJECTION, SOLUTION INTRAVENOUS; SUBCUTANEOUS at 16:22

## 2025-02-10 RX ADMIN — INSULIN ASPART 1 UNITS: 100 INJECTION, SOLUTION INTRAVENOUS; SUBCUTANEOUS at 20:19

## 2025-02-10 RX ADMIN — ACETAMINOPHEN 975 MG: 160 SOLUTION ORAL at 04:27

## 2025-02-10 ASSESSMENT — ACTIVITIES OF DAILY LIVING (ADL)
ADLS_ACUITY_SCORE: 36
ADLS_ACUITY_SCORE: 38
ADLS_ACUITY_SCORE: 36
ADLS_ACUITY_SCORE: 36
ADLS_ACUITY_SCORE: 38
ADLS_ACUITY_SCORE: 36
ADLS_ACUITY_SCORE: 38
ADLS_ACUITY_SCORE: 36
ADLS_ACUITY_SCORE: 36
ADLS_ACUITY_SCORE: 38

## 2025-02-10 NOTE — PLAN OF CARE
Major Shift Events: Neuro intact. Up SBA. Oxycodone x1 for pain overnight. VSS on RA. BM x1 this shift. Cylic TF overnight at 100 ml/hr until 0800 - tolerating. Voiding spont. WBC increasing (18.9). Patient unhappy with length of stay, hoping to leave today.   Plan: Continue with POC  For vital signs and complete assessments, please see documentation flowsheets.

## 2025-02-10 NOTE — CONSULTS
Care Management Initial Consult    General Information  Assessment completed with: Patient, [place] Patient, and Stepmother Nancy  Type of CM/SW Visit: Initial Assessment    Primary Care Provider verified and updated as needed: Yes   Readmission within the last 30 days: no previous admission in last 30 days      Reason for Consult: discharge planning  Advance Care Planning: Advance Care Planning Reviewed: no concerns identified          Communication Assessment  Patient's communication style: spoken language (English or Bilingual)    Hearing Difficulty or Deaf: no   Wear Glasses or Blind: yes    Cognitive  Cognitive/Neuro/Behavioral: WDL  Level of Consciousness: alert  Arousal Level: opens eyes spontaneously  Orientation: oriented x 4  Mood/Behavior: calm, cooperative  Best Language: 0 - No aphasia  Speech: clear, spontaneous, logical    Living Environment:   People in home: sibling(s)  Mayo  Current living Arrangements: house      Able to return to prior arrangements: yes       Family/Social Support:  Care provided by: self, parent(s)  Provides care for: no one  Marital Status: Single  Support system: Step parent(s)          Description of Support System: Supportive, Involved    Support Assessment: Adequate family and caregiver support, Adequate social supports    Current Resources:   Patient receiving home care services: No        Community Resources: OP Infusion  Equipment currently used at home: none  Supplies currently used at home: None    Employment/Financial:  Employment Status: employed full-time        Financial Concerns: none   Referral to Financial Worker: No       Does the patient's insurance plan have a 3 day qualifying hospital stay waiver?  No    Lifestyle & Psychosocial Needs:  Social Drivers of Health     Food Insecurity: Low Risk  (2/3/2025)    Food Insecurity     Within the past 12 months, did you worry that your food would run out before you got money to buy more?: No     Within the past 12 months,  did the food you bought just not last and you didn t have money to get more?: No   Depression: Not at risk (1/15/2025)    PHQ-2     PHQ-2 Score: 0   Housing Stability: Low Risk  (2/3/2025)    Housing Stability     Do you have housing? : Yes     Are you worried about losing your housing?: No   Tobacco Use: Low Risk  (2/3/2025)    Patient History     Smoking Tobacco Use: Never     Smokeless Tobacco Use: Never     Passive Exposure: Not on file   Financial Resource Strain: Low Risk  (2/3/2025)    Financial Resource Strain     Within the past 12 months, have you or your family members you live with been unable to get utilities (heat, electricity) when it was really needed?: No   Alcohol Use: Not on file   Transportation Needs: Low Risk  (2/3/2025)    Transportation Needs     Within the past 12 months, has lack of transportation kept you from medical appointments, getting your medicines, non-medical meetings or appointments, work, or from getting things that you need?: No   Physical Activity: Not on file   Interpersonal Safety: Low Risk  (2/3/2025)    Interpersonal Safety     Do you feel physically and emotionally safe where you currently live?: Yes     Within the past 12 months, have you been hit, slapped, kicked or otherwise physically hurt by someone?: No     Within the past 12 months, have you been humiliated or emotionally abused in other ways by your partner or ex-partner?: No   Stress: Not on file   Social Connections: Not on file   Health Literacy: Not on file       Functional Status:  Prior to admission patient needed assistance:   Dependent ADLs:: Independent, Ambulation-no assistive device  Dependent IADLs:: Cleaning, Independent, Cooking, Laundry, Shopping, Meal Preparation, Medication Management, Money Management  Assesssment of Functional Status: Not at  functional baseline    Mental Health Status:  Mental Health Status: No Current Concerns       Chemical Dependency Status:  Chemical Dependency Status: No  "Current Concerns           Values/Beliefs:  Spiritual, Cultural Beliefs, Orthodox Practices, Values that affect care: no             Discussed  Partnership in Safe Discharge Planning  document with patient/family: No    Additional Information:    Per Dr. 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 Arrives to the SICU extubated and off of pressors.\"    Next Steps: RNCM met with the patient and step mother.  Patient is alert and oriented X4. This writer discussed the Care Management Role.   Confirmed demographics and PCP.    Patient lives with his brother Mayo in private house with few steps to the main entranc and staircase to the lower floor where patient has his bedroom and shower.   Patient was completely independent with all ADLS, meals, meds and IADLs. His family assisted with transportation PRN .     He worked full time. His salary was the source of income. Patient denies any financial needs. He denies any safety concerns or mental health needs.      Patient has not been using any DMEs, not connected with [facility].   He was getting chemotherapy in outpatient clinic.     Family will provided discharge transportation.     Discussed with patient that he will need TF for discharge. No preferences for the provider. Referral has been sent to [facility]. Requested Primary team to place TF notes.     [facility] will not be able to get verification of benefits/ authorization till next day and will need " time to complete patient's teaching.   Updated primary team and patient's RN about anticipated services to be arranged for discharge on 2/15/25.     Elham Loja, MSN, MA, CCM, RN  4C/4A/4E ICU Care Coordinator  Phone:[phone]  Available via [employer]     For Weekend & Holiday on call RN Care Coordinator:  [place] [facility] ([phone]) Saturday & Sunday; ([phone])  Recognized Holidays   Weekend 4C/4A/4E ICUs /6A Care Coordinator  Available via [employer]     Care Management team will continue to follow for this admission.

## 2025-02-10 NOTE — PROGRESS NOTES
THORACIC SURGERY PROGRESS NOTE     S: Pain well controlled. Tolerating tube feeds at goal. Urinating, stooling, and ambulating appropriately      O:  Patient Vitals for the past 24 hrs:   BP Temp Temp src Pulse Resp SpO2 Weight   02/10/25 0900 129/73 -- -- 98 -- 94 % --   02/10/25 0811 -- -- -- 96 -- 95 % --   02/10/25 0800 -- 97.9  F (36.6  C) Oral 101 20 97 % --   02/10/25 0700 -- -- -- 83 -- 93 % --   02/10/25 0400 119/75 98.2  F (36.8  C) Oral 94 16 92 % 99 kg (218 lb 4.1 oz)   02/10/25 0130 -- -- -- -- -- 94 % --   02/10/25 0000 121/73 98.3  F (36.8  C) Oral 82 16 95 % --   02/09/25 2000 128/71 97.8  F (36.6  C) Oral 101 16 96 % --   02/09/25 1700 115/77 -- -- 83 -- 99 % --   02/09/25 1600 121/73 98.2  F (36.8  C) Oral 81 20 94 % --   02/09/25 1400 -- -- -- 94 -- 94 % --   02/09/25 1300 124/70 98.8  F (37.1  C) Oral 83 -- 93 % --   02/09/25 1200 -- -- -- 101 16 96 % --   02/09/25 1100 -- -- -- 87 -- 93 % --        Gen: Lying in bed, appears comfortable, no acute distress.  Resp: non labored breathing  CV: RRR per tele  Abd: Mildly distended, J tube in place, abdominal incision clean, dry, and intact  Ext: warm and well perfused, no edema, SCDs on     A/P: This is a 35 y.o. male pt w hx Seiwart 3 EJ adenocarcinoma with linitis plastica s/p neoadjuvant chemotherapy and on POD3 from EGD, laparoscopic dissection of the hiatus and mediastinum, laparotomy and left thoracotomy with gastrectomy, proximal esophagectomy, intrathoracic RXY esophagojejunostomy, D2 abdominal lymphadenectomy, jejunostomy tube placement, cryoablation of left intercostal nerves 5-10 and left chest tube placement.     Changes Today  -CT chest abdomen pelvis with oral and IV contrast to investigate for source of leukocytosis  -No plans for discharge today given change in clinical course  -Continue cycled TF  -Oral pain meds      Pain/neuro : Well controlled on current regimen  CV: Amiodarone for atrial fibrillation prophy   Resp: Chest tube  "removed  GI: Strict NPO. NG removed.  Bisacodyl suppository PRN. Cycled TF  ID: Zosyn and diflucan for increasing WBC  Hem: Daily CBC  PPx: Enoxaparin for vte prophy, will discharge with lovenox for a total of 30 days postoperatively  Renal/FEN: Replace electrolytes PRN  PT/OT/SW: See notes, PT/OT recommend discharge with home cares  Dispo: To [facility], order placed, awaiting bed assignment. Discharge from hospital in ~24 hours     Seen and discussed with staff    Tobias Whitney PA-C  Thoracic & Foregut Surgery    To page Thoracic Surgery team, click here: [facility]   Choose \"On Call\" tab at top, then use the search box for \"Thoracic\", then select the \"THORACIC SURGERY /[facility]\"          "

## 2025-02-11 ENCOUNTER — HOME INFUSION (OUTPATIENT)
Dept: HOME HEALTH SERVICES | Facility: HOME HEALTH | Age: 35
End: 2025-02-11
Payer: COMMERCIAL

## 2025-02-11 ENCOUNTER — HOME INFUSION BILLING (OUTPATIENT)
Dept: HOME HEALTH SERVICES | Facility: HOME HEALTH | Age: 35
End: 2025-02-11
Payer: COMMERCIAL

## 2025-02-11 VITALS
DIASTOLIC BLOOD PRESSURE: 73 MMHG | TEMPERATURE: 98.1 F | HEART RATE: 86 BPM | SYSTOLIC BLOOD PRESSURE: 131 MMHG | RESPIRATION RATE: 16 BRPM | OXYGEN SATURATION: 96 % | BODY MASS INDEX: 26.17 KG/M2 | HEIGHT: 74 IN | WEIGHT: 203.9 LBS

## 2025-02-11 VITALS — HEIGHT: 74 IN | BODY MASS INDEX: 26.17 KG/M2 | WEIGHT: 203.9 LBS

## 2025-02-11 DIAGNOSIS — C16.0 SIEWERT TYPE III ADENOCARCINOMA OF ESOPHAGOGASTRIC JUNCTION (H): Primary | ICD-10-CM

## 2025-02-11 DIAGNOSIS — C16.0 MALIGNANT NEOPLASM OF CARDIA OF STOMACH (H): Primary | ICD-10-CM

## 2025-02-11 LAB
ANION GAP SERPL CALCULATED.3IONS-SCNC: 13 MMOL/L (ref 7–15)
BUN SERPL-MCNC: 25.2 MG/DL (ref 6–20)
CALCIUM SERPL-MCNC: 8.9 MG/DL (ref 8.8–10.4)
CHLORIDE SERPL-SCNC: 107 MMOL/L (ref 98–107)
CREAT SERPL-MCNC: 0.72 MG/DL (ref 0.67–1.17)
EGFRCR SERPLBLD CKD-EPI 2021: >90 ML/MIN/1.73M2
ERYTHROCYTE [DISTWIDTH] IN BLOOD BY AUTOMATED COUNT: 14.4 % (ref 10–15)
GLUCOSE BLDC GLUCOMTR-MCNC: 121 MG/DL (ref 70–99)
GLUCOSE BLDC GLUCOMTR-MCNC: 164 MG/DL (ref 70–99)
GLUCOSE BLDC GLUCOMTR-MCNC: 193 MG/DL (ref 70–99)
GLUCOSE SERPL-MCNC: 160 MG/DL (ref 70–99)
HCO3 SERPL-SCNC: 21 MMOL/L (ref 22–29)
HCT VFR BLD AUTO: 34 % (ref 40–53)
HGB BLD-MCNC: 11.3 G/DL (ref 13.3–17.7)
MAGNESIUM SERPL-MCNC: 2.2 MG/DL (ref 1.7–2.3)
MCH RBC QN AUTO: 28.3 PG (ref 26.5–33)
MCHC RBC AUTO-ENTMCNC: 33.2 G/DL (ref 31.5–36.5)
MCV RBC AUTO: 85 FL (ref 78–100)
PHOSPHATE SERPL-MCNC: 3 MG/DL (ref 2.5–4.5)
PLATELET # BLD AUTO: 442 10E3/UL (ref 150–450)
POTASSIUM SERPL-SCNC: 4.2 MMOL/L (ref 3.4–5.3)
RBC # BLD AUTO: 4 10E6/UL (ref 4.4–5.9)
SODIUM SERPL-SCNC: 141 MMOL/L (ref 135–145)
WBC # BLD AUTO: 14.9 10E3/UL (ref 4–11)

## 2025-02-11 PROCEDURE — 250N000011 HC RX IP 250 OP 636: Performed by: PHYSICIAN ASSISTANT

## 2025-02-11 PROCEDURE — 250N000009 HC RX 250: Performed by: THORACIC SURGERY (CARDIOTHORACIC VASCULAR SURGERY)

## 2025-02-11 PROCEDURE — 250N000011 HC RX IP 250 OP 636: Performed by: STUDENT IN AN ORGANIZED HEALTH CARE EDUCATION/TRAINING PROGRAM

## 2025-02-11 PROCEDURE — 85027 COMPLETE CBC AUTOMATED: CPT | Performed by: STUDENT IN AN ORGANIZED HEALTH CARE EDUCATION/TRAINING PROGRAM

## 2025-02-11 PROCEDURE — 250N000013 HC RX MED GY IP 250 OP 250 PS 637: Performed by: PHYSICIAN ASSISTANT

## 2025-02-11 PROCEDURE — 94640 AIRWAY INHALATION TREATMENT: CPT

## 2025-02-11 PROCEDURE — 84100 ASSAY OF PHOSPHORUS: CPT | Performed by: STUDENT IN AN ORGANIZED HEALTH CARE EDUCATION/TRAINING PROGRAM

## 2025-02-11 PROCEDURE — 999N000157 HC STATISTIC RCP TIME EA 10 MIN

## 2025-02-11 PROCEDURE — 250N000011 HC RX IP 250 OP 636: Performed by: THORACIC SURGERY (CARDIOTHORACIC VASCULAR SURGERY)

## 2025-02-11 PROCEDURE — B4152 EF CALORIE DENSE>/=1.5KCAL: HCPCS

## 2025-02-11 PROCEDURE — A6402 STERILE GAUZE <= 16 SQ IN: HCPCS

## 2025-02-11 PROCEDURE — 94799 UNLISTED PULMONARY SVC/PX: CPT

## 2025-02-11 PROCEDURE — B9002 ENTER NUTR INF PUMP ANY TYPE: HCPCS | Mod: RR

## 2025-02-11 PROCEDURE — 80048 BASIC METABOLIC PNL TOTAL CA: CPT | Performed by: STUDENT IN AN ORGANIZED HEALTH CARE EDUCATION/TRAINING PROGRAM

## 2025-02-11 PROCEDURE — 250N000013 HC RX MED GY IP 250 OP 250 PS 637: Performed by: THORACIC SURGERY (CARDIOTHORACIC VASCULAR SURGERY)

## 2025-02-11 PROCEDURE — 83735 ASSAY OF MAGNESIUM: CPT | Performed by: STUDENT IN AN ORGANIZED HEALTH CARE EDUCATION/TRAINING PROGRAM

## 2025-02-11 RX ORDER — HEPARIN SODIUM,PORCINE 10 UNIT/ML
5-10 VIAL (ML) INTRAVENOUS
Status: DISCONTINUED | OUTPATIENT
Start: 2025-02-11 | End: 2025-02-11 | Stop reason: HOSPADM

## 2025-02-11 RX ORDER — HEPARIN SODIUM,PORCINE 10 UNIT/ML
5-10 VIAL (ML) INTRAVENOUS EVERY 24 HOURS
Status: DISCONTINUED | OUTPATIENT
Start: 2025-02-11 | End: 2025-02-11 | Stop reason: HOSPADM

## 2025-02-11 RX ORDER — HEPARIN SODIUM (PORCINE) LOCK FLUSH IV SOLN 100 UNIT/ML 100 UNIT/ML
5-10 SOLUTION INTRAVENOUS
Status: DISCONTINUED | OUTPATIENT
Start: 2025-02-11 | End: 2025-02-11 | Stop reason: HOSPADM

## 2025-02-11 RX ADMIN — ONDANSETRON 4 MG: 2 INJECTION, SOLUTION INTRAMUSCULAR; INTRAVENOUS at 04:28

## 2025-02-11 RX ADMIN — INSULIN ASPART 1 UNITS: 100 INJECTION, SOLUTION INTRAVENOUS; SUBCUTANEOUS at 04:52

## 2025-02-11 RX ADMIN — INSULIN ASPART 2 UNITS: 100 INJECTION, SOLUTION INTRAVENOUS; SUBCUTANEOUS at 08:47

## 2025-02-11 RX ADMIN — AMIODARONE HYDROCHLORIDE 200 MG: 200 TABLET ORAL at 08:47

## 2025-02-11 RX ADMIN — ENOXAPARIN SODIUM 40 MG: 40 INJECTION SUBCUTANEOUS at 11:49

## 2025-02-11 RX ADMIN — FLUCONAZOLE 400 MG: 2 INJECTION, SOLUTION INTRAVENOUS at 11:41

## 2025-02-11 RX ADMIN — ACETAMINOPHEN 975 MG: 160 SOLUTION ORAL at 04:25

## 2025-02-11 RX ADMIN — ACETYLCYSTEINE 2 ML: 200 SOLUTION ORAL; RESPIRATORY (INHALATION) at 08:15

## 2025-02-11 RX ADMIN — HEPARIN, PORCINE (PF) 10 UNIT/ML INTRAVENOUS SYRINGE 5 ML: at 15:49

## 2025-02-11 RX ADMIN — ACETAMINOPHEN 975 MG: 160 SOLUTION ORAL at 11:41

## 2025-02-11 RX ADMIN — AMOXICILLIN AND CLAVULANATE POTASSIUM 875 MG: 400; 57 POWDER, FOR SUSPENSION ORAL at 08:47

## 2025-02-11 RX ADMIN — ALBUTEROL SULFATE 2.5 MG: 2.5 SOLUTION RESPIRATORY (INHALATION) at 08:15

## 2025-02-11 ASSESSMENT — ACTIVITIES OF DAILY LIVING (ADL)
ADLS_ACUITY_SCORE: 42
ADLS_ACUITY_SCORE: 42
ADLS_ACUITY_SCORE: 38
ADLS_ACUITY_SCORE: 42
ADLS_ACUITY_SCORE: 38
ADLS_ACUITY_SCORE: 42
ADLS_ACUITY_SCORE: 42
ADLS_ACUITY_SCORE: 38
ADLS_ACUITY_SCORE: 42
ADLS_ACUITY_SCORE: 38
ADLS_ACUITY_SCORE: 42
ADLS_ACUITY_SCORE: 38

## 2025-02-11 NOTE — PROGRESS NOTES
"[facility] Nutrition SOC review     Type of therapy: Enteral  Initiation of therapy at home: No    Anthropometrics/Weight Goals  Height: 1.88 m (6' 2\")  Weight: 92.5 kg (203 lb 14.4 oz)  IBW Male (kg): 82.2  BMI (kg): 26.18  [facility] Dosing Weight: 96 kg (211 lb 10.3 oz)  Weight history / comments:: 02/03/25 100.2 kg (220 lb 14.4 oz)  01/15/25 102.3 kg (225 lb 8.5 oz)  01/15/25 102.3 kg (225 lb 9.6 oz)  11/06/24 93.9 kg (207 lb 1.6 oz)  09/30/24 99.7 kg (219 lb 12.8 oz)  09/25/24 101.3 kg (223 lb 4.8 oz)    Nutrition and Medical History  Physical findings from chart: 35 year old male who on 2/3/2025 underwent the above-named procedures.  He tolerated the operation well and postoperatively was transferred to the post-surgical intensive care unit.  The remainder of his course was essentially uncomplicated.  He did have an elevated white blood cell count for several days, which prompted a CT chest abdomen pelvis.  The scan did not show any probable cause.  Subsequently his white blood cell count decreased without intervention.  Prior to discharge, his pain was controlled well, he was able to perform ADLs and ambulate independently without difficulty, and had full return of bowel and bladder function.  On February 11, 2025, he was discharged to home in stable condition with jejunostomy tube and tube feeds in place.  Feeding tube type: GJ-Tube    Current Nutrition Orders  Oral Diet: NPO    Enteral Nutrition Orders  Enteral Formula: TwoCal HN  Rate/Frequency: 100ml/hr X 14 hrs/day  Water Flushes: 1800ml throughout the day  Enteral Nutrition Provides: 1400 ml, 2800 kcals (29 kcal/kg), 117 g protein (1.2g/kg), 306 g CHO, 980 ml free water, and 7 g fiber.  ON discharge,  ensure additional 1800 ml free water per day to provide 2780 ml to meet hydration needs.    Assessed Nutritional Needs  Kcal Needs: 2400 - 2880+ kcals/day (25 - 30+ kcals/kg)  Protein Needs: 115 - 144 grams protein/day (1.2 - 1.5 g/kg)  Fluid Needs: 1 " ml/kcal fluid  Nutrition Support is meeting what percent of needs: 100%    Pertinent Medications  Medications: includes senokot, oxycodone, lovenox, augmentin    Lab Information  Labs: 2/11/25:     Implementation  Intervention: SOC review    Plan  Follow up/Recommendations: Continue current TFs upon d/c to home      Molly Siemens, RD, CNSC, MG  [facility] Dietitian

## 2025-02-11 NOTE — PROGRESS NOTES
"CLINICAL NUTRITION SERVICES - REASSESSMENT NOTE     RECOMMENDATIONS FOR MDs/PROVIDERS TO ORDER:   None at this time.    Malnutrition Status:     Patient does not meet two of the established criteria necessary for diagnosing malnutrition    Registered Dietitian Interventions:  - Continue TF as ordered:  Nutrition support via Jtube: TwoCal HN @ 100 ml/hr x 14 hours (6p-8a) to provide: 1400 ml, 2800 kcals (29 kcal/kg), 117 g protein (1.2g/kg), 306 g CHO, 980 ml free water, and 7 g fiber.  Needs additional 1800 ml free water per day to provide 2780 ml     - Ordered updated standing wt  - Discontinued Boost supplement order    Future/Additional Recommendations:   Monitor weight / need to increase tube feeding volume if weight loss occurs       SUBJECTIVE INFORMATION  Assessed patient in room.    CURRENT NUTRITION ORDERS  Diet: NPO  Nutrition Support: EN via Jtube (reached goal 2/9 @1800)   2/9 - Current: TwoCal HN (or equivalent) @ 100 ml/hr x 14 hours (6p-8a) to provide: 1400 ml, 2800 kcals (29 kcal/kg), 117 g protein (1.2g/kg), 306 g CHO, 980 ml free water, and 7 g fiber.  --  ON discharge,  ensure additional 1800 ml free water per day to provide 2780 ml to meet hydration needs.    Supplements: Boost soothe between meals x1. (Ordered per provider, never received; pt has been NPO since admit.)    CURRENT INTAKE/TOLERANCE   6-d average of TF intake provided 1812 kcals/d (18.9 kcals/kg) and 92.4 g PRO/d (1.0 g PRO/kg).  -- Providing 63-76% of estimated kcal needs and 67-83% of estimated protein needs.     NEW FINDINGS  Met w/pt 2/11, pt denies any intolerance to TF and reports doing well. Pt reported nausea this morning after TF and was \"burping a bunch\" when he got sat up in his chair, but feels okay now. Pt denies emesis.     Pt reports working 2nd shift at work and does not get home until ~11pm. Pt is aware he can adjust his time frame to fit his lifestyle and the (6 pm-8a) is just a guide. Pt will ensure this time " period spans 14 hrs. RN asked for home tf order and is aware of the discharge order along w/ at home, pt ensuring an additional 1800 mL free water/d to meet his hydration needs.    1-2 BM's daily since 2/7 after several days without a BM.  Continued risk for osmotic diarrhea with liquid tylenol (hyperosmolar)     Weight: Wt gain of >10 lbs in one d is most likely misleading; No wt loss noted. (Pt's baseline 230 lbs per RD 2/4 note)  Date Weight Weight Method   02/11/25  104.4 kg (230 lb 2.6 oz) Bed scale   02/10/25  99 kg (218 lb 4.1 oz) Bed scale   02/08/25  96.1 kg (211 lb 13.8 oz) Bed scale   02/06/25  96.7 kg (213 lb 3 oz) Bed scale   02/05/25  99.7 kg (219 lb 12.8 oz) Bed scale   02/05/25  101.2 kg (223 lb 1.7 oz) Bed scale   02/03/25  100.2 kg (220 lb 14.4 oz) Bed scale   02/03/25  100.7 kg (222 lb 0.1 oz) Standing scale   Dosing Weight (NEW 2/9): 96 kg, based on actual wt    Skin/wounds: Abdomen soft, non-tender; David score of 21 on admit w/ nutrition rated adequate. Surgical Sites: Left lateral abdomen, medial abdomen, and left flank.  GI symptoms: LBM 2/11; Nausea/Vomiting denied per flowsheets    Nutrition-relevant labs: Reviewed   02/10/25  02/11/25    Sodium 141 141   Potassium 4.4 4.2   Carbon Dioxide (CO2) 21 (L) 21 (L)   Urea Nitrogen 24.5 (H) 25.2 (H)   Creatinine 0.77 0.72   GFR Estimate >90 >90   Calcium 9.0 8.9   Magnesium 2.3 2.2   Phosphorus 3.4 3.0   Glucose 130 (H) 160 (H)   WBC 18.9 (H) 14.9 (H)   Hemoglobin 11.7 (L) 11.3 (L)     Nutrition-relevant medications: Reviewed  Medication Dose Route Frequency Last Admin    amiodarone (CORDARONE) suspension 200 mg  200 mg Oral or FT or NG tube Daily 200 mg at 02/10/25 0855    amoxicillin-clavulanate (AUGMENTIN) 400-57 MG/5ML suspension 875 mg  875 mg Per J Tube  mg at 02/10/25 1758    cyanocobalamin injection 1,000 mcg  1,000 mcg Intramuscular Q30 Days 1,000 mcg at 02/09/25 1709    enoxaparin ANTICOAGULANT (LOVENOX) injection 40 mg  40 mg  Subcutaneous Q24H 40 mg at 02/10/25 1247    fluconazole (DIFLUCAN) intermittent infusion 400 mg  400 mg Intravenous Q24H 400 mg at 02/10/25 1247    polyethylene glycol (MIRALAX) Packet 17 g  17 g Per Feeding Tube Daily 17 g at 02/09/25 0829    sennosides (SENOKOT) syrup 5 mL  5 mL Oral or Feeding Tube At Bedtime 5 mL at 02/09/25 2016      Active PRNs   Medication Dose Route Frequency Last Admin    bisacodyl (DULCOLAX) suppository 10 mg  10 mg Rectal Daily PRN      nalbuphine (NUBAIN) injection 2.5-5 mg  2.5-5 mg Intravenous Q6H PRN      ondansetron (ZOFRAN) injection 4 mg  4 mg Intravenous Q6H PRN 4 mg at 02/11/25 0428    prochlorperazine (COMPAZINE) injection 10 mg  10 mg Intravenous Q6H PRN       MALNUTRITION  % Intake: No decreased intake noted  % Weight Loss: None noted  Subcutaneous Fat Loss: None observed  Muscle Loss: None observed  Fluid Accumulation/Edema: None noted  Malnutrition Diagnosis: Patient does not meet two of the established criteria necessary for diagnosing malnutrition  Malnutrition Present on Admission: No    EVALUATION OF THE PROGRESS TOWARD GOALS   Previous Goals  Total avg nutritional intake to meet a minimum of 25 kcal/kg and 1.5 g PRO/kg daily (per dosing wt 100 kg).  Evaluation: Progressing    Previous Nutrition Diagnosis  Altered gastrointestinal (GI) function related to s/p total gastrectomy, distal esophagectomy, with RnY esophagojejunostomy as evidenced by J-tube placement and consult for EN support to meet nutrition needs at this time.  Evaluation: No change, updated below    NUTRITION DIAGNOSIS  Altered gastrointestinal (GI) function related to s/p total gastrectomy, distal esophagectomy, with RnY esophagojejunostomy as evidenced by J-tube placement and need for EN support to meet nutrition needs at this time.    INTERVENTIONS  See nutrition interventions above    Goals  Total avg nutritional intake to meet a minimum of 25 kcal/kg and 1.5 g PRO/kg daily (per dosing wt 100 kg).      Monitoring/Evaluation      Progress toward goals will be monitored and evaluated per policy.    Jessica Villafuerte  N Dietetic Intern

## 2025-02-11 NOTE — PLAN OF CARE
N: AO, able to make needs known. Neuro Intact. Denies pain.   C: NSR, no ectopy noted. MAP > 65 without intervention. Pulses palpable. Afebrile.   R: RA. Lungs clear. Uses IS independently. Denies shortness of breath, slight dyspnea with ambulation in the hallways   GI: NPO. PEG - Cyclic tube feeds 4420-9355. CT completed. Abdomen soft, non tender. LBM 2/10  : Voiding without difficulty.   SKIN: Incisions WDL, otherwise intact   ACCESS: Port, PIV  ACTIVITY: SBA ambulates around room and hallway   SOCIAL: Family at bedside     PLAN: Demonstrated G tube med administration and Lovenox self administration. Plan to discharge home tomorrow.

## 2025-02-11 NOTE — PROGRESS NOTES
Care Management Follow Up    Length of Stay (days): 8    Expected Discharge Date: 02/12/2025     Concerns to be Addressed: all concerns addressed in this encounter     Patient plan of care discussed at interdisciplinary rounds: Yes    Anticipated Discharge Disposition:  Home with Home Infusions for TF               Anticipated Discharge Services:  Home infusions   Anticipated Discharge DME:  None    Patient/family educated on Medicare website which has current facility and service quality ratings:  yes   Education Provided on the Discharge Plan:  yes   Patient/Family in Agreement with the Plan:  yes    Referrals Placed by CM/SW:  [facility]  Private pay costs discussed:  yes.   Discussed  Partnership in Safe Discharge Planning  document with patient/family: No     Additional Information:    RNCC contacted [facility] for the insurance coverage for TF:   Enteral supplies and potentially the TwoCal formula (must be via tube) through their [facility] plan (plan year 07/01/24-06/30/25), patient has a deductible of $3,000.00 met $3,000.00, once the deductible is met patient is covered at 80%. The patient has an out of pocket of $6,000.00 met $6,000.00 once the out of pocket is met patient is covered at 100%. A pre-determination is being processed on the back end for potential coverage for the TwoCal formula. Until then [facility] will have to quote no coverage for TwoCal formula and patient must agree and sign SPQ estimate $23.52 per day for formula only, Supplies for TF are covered by insurance.     Next Steps: Confirm with patient and arrange.     Addendum 11:30 am Confirmed with the patient thinks he reached $6000 out pocket maximum and  he is OK with the cost.         Care Management Discharge Note    Discharge Date: 02/12/2025       Discharge Disposition: Home, Home Infusion    Discharge Services: None    Discharge DME: None    Discharge Transportation: family or friend will provide    Private pay  costs discussed:  Yes, See above  Does the patient's insurance plan have a 3 day qualifying hospital stay waiver?  No    PAS Confirmation Code:  N/a   Patient/family educated on Medicare website which has current facility and service quality ratings: yes    Education Provided on the Discharge Plan: Yes  Persons Notified of Discharge Plans:  Patient, primary MD, RN.   Patient/Family in Agreement with the Plan: yes    Handoff Referral Completed: Yes, non-[facility] PCP: External handoff communication completed    Additional Information:  Anticipating [facility], for TF  teaching at 2-2:30 pm.   Requested patient's brother to come for teach at 2 pm and providing discharge transportation to home.      Elham Loja MSN, MA, CCM, RN  4C/4A/4E ICU Care Coordinator  Phone:[phone]  Available via [employer]     For Weekend & Holiday on call RN Care Coordinator:  [place] [facility] ([phone]) Saturday & Sunday; ([phone]) [facility] Recognized Holidays   Weekend 4C/4A/4E ICUs /6A Care Coordinator  Available via [employer]

## 2025-02-11 NOTE — PROGRESS NOTES
Met with pt and his brother at bedside and went over teaching of enteral feeding and how to set up backpack, Infinity pump, and feeding bag. Demonstrated how to set up and [place] set up after demonstration and did well. Went over discharge order Two Arturo HN at 100ml/hr x 14 hours per day.  60ml water flush before and after feeding. Along with 60ml of water flush every 2 hours to equal 1800ml of water/day. . This Enteral Nurse Liaison provided Education on Enteral Therapy, including bag/air removal, feeding rate setup, priming, feeding tube flushing and backpack setup.  Encouraged to call [facility] for any questions, clarifications or problems.  Educated on Deliveries, importance of refrigerating open formula. Informed them of the supplies that will be delivered to his home on day of discharge.  Went over folder that will be in with the supplies asked for them to look through the folder and look over service agreement when get home and sign and send back to [facility].  Went over when to contact I/provider with them.  Pt and brother feel comfortable with being independent with administering TF.     DISCHARGE DATE: Potential dc 02/11/25 (SOC)  THERAPY: Enteral Only  DRUG/ DELIVERY MODE: Delivery to pt's home on day of discharge.  FIRST HOME DOSE DUE: 1800  DELIVERY: Delivery to pt's home on day of discharge. Address [address] [place] [id number]  SUPPLIES: 2x2 gauze,  feeding bags, backpack, Ininity pump, Two Arturo formula, flexi trak, 60ml syringes, medication syringes, med cups, y connectors.  LINE/TUBE: PEJ  SNV: NA  [facility] FOLLOWING PROVIDER: Dr. Russ Lovell  90 DAY FARHANA NOTE: Yes 90 day FARHANA needed: No  OTHER: Met with pt and his brother at bedside this afternoon and did teaching of enteral feedings. Pt and his brother states they feel comfortable with self-administration.  Brother will assist pt as needed. Answered question that they had. Informed them this writer will follow up with them on day of discharge.   Thank you for the  referral.  Penny Beckford LPN  Enteral Nurse Liaison [facility]  [address] [place] [address]  [phone]

## 2025-02-11 NOTE — PROGRESS NOTES
[facility]        Start of Care:  2/11/25    Dx:  Malignant neoplasm of cardia of stomach    Therapy:  Enteral therapy      Delivery:  Delivery 2/11/25     Nursing:  Enteral only,  No home nursing.       Franny Floyd RN on 2/11/2025 at 4:15 PM  [facility]  Resource RN

## 2025-02-11 NOTE — PLAN OF CARE
Major shift events    CV: Sinus rhythm. BP WDL w/out intervention  Resp: Dim in bases, ind w/ IS, on RA  Neuro: Intact  Labs: WBC 14.9 this am, BG q4h  GI: NPO, TF cyclic from 9514-7627 running at 100 mL/hr w/ standard FWF. BM overnight  : Voiding spontaneously  Skin: Abd and thoracic incisions. Lap sites  Access: PIV SL, Port SL  Pain: Complained of minimal pain, found adequate relief w/ sched Tylenol/rest  Activity: SBA for line management  Social: Calm, cooperative, pleasant overnight      Plan: Continue to trend WBC levels. Orders for 7B. Needing supplies set up for discharge to home.     See flowsheets for full assessments and vitals      Goal Outcome Evaluation:      Plan of Care Reviewed With: patient    Overall Patient Progress: improvingOverall Patient Progress: improving    Outcome Evaluation: Pt demonstrated independence w/ cares overnight.

## 2025-02-11 NOTE — PROGRESS NOTES
Met with pt and his brother at bedside and went over teaching of enteral feeding and how to set up backpack, Infinity pump, and feeding bag. Demonstrated how to set up and [place] set up after demonstration and did well. Went over discharge order Two Arturo HN at 100ml/hr x 14 hours per day.  60ml water flush before and after feeding. Along with 60ml of water flush every 2 hours to equal 1800ml of water/day. . This Enteral Nurse Liaison provided Education on Enteral Therapy, including bag/air removal, feeding rate setup, priming, feeding tube flushing and backpack setup.  Encouraged to call [facility] for any questions, clarifications or problems.  Educated on Deliveries, importance of refrigerating open formula. Informed them of the supplies that will be delivered to his home on day of discharge.  Went over folder that will be in with the supplies asked for them to look through the folder and look over service agreement when get home and sign and send back to [facility].  Went over when to contact I/provider with them.  Pt and brother feel comfortable with being independent with administering TF.     DISCHARGE DATE: Potential dc 02/11/25 (SOC)  THERAPY: Enteral Only  DRUG/ DELIVERY MODE: Delivery to pt's home on day of discharge.  FIRST HOME DOSE DUE: 1800  DELIVERY: Delivery to pt's home on day of discharge. Address is [address] [place] [address]. [place] [address]  SUPPLIES: 2x2 gauze,  feeding bags, backpack, Ininity pump, Two Arturo formula, flexi trak, 60ml syringes, medication syringes, med cups, y connectors.  LINE/TUBE: PEAN  SNV: NA  [facility] FOLLOWING PROVIDER: Dr. Russ Lovell  90 DAY FARHANA NOTE: Yes 90 day FARHANA needed: No  OTHER: Met with pt and his brother at bedside this afternoon and did teaching of enteral feedings. Pt and his brother states they feel comfortable with self-administration.  Brother will assist pt as needed. Answered question that they had. Informed them this writer will follow up with them on day of discharge.   Thank you for  the referral.  Penny Beckford LPN  Enteral Nurse Liaison [facility]  [address] [place] [address]  [phone]

## 2025-02-11 NOTE — DISCHARGE SUMMARY
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     Postoperative diagnosis: The same as preoperative diagnosis     Procedure:   Esophagogastroscopy  Laparoscopic dissection of the hiatus and mediastinal dissection  Laparotomy and LEFT thoracotomy with total gastrectomy, distal esophagectomy and intrathoracic Mode-en-Y esophagojejunostomy  D2 abdominal lymphadenectomy  Jejunostomy feeding tube placement  Cryoablation of LEFT intercostal nerves 5-10    PATHOLOGY RESULTS: Esophageal Adenocarcinoma    CULTURE RESULTS: None     INTRAOPERATIVE COMPLICATIONS: None     POSTOPERATIVE MEDICAL ISSUES: None     DRAINS/TUBES PRESENT AT DISCHARGE: Feeding jejunostomy    DATE OF DISCHARGE:  2025     HOSPITAL COURSE: Prashant Allen is a 35 year old male who on 2/3/2025 underwent the above-named procedures.  He tolerated the operation well and postoperatively was transferred to the post-surgical intensive care unit.  The remainder of his course was essentially uncomplicated.  He did have an elevated white blood cell count for several days, which prompted a CT chest abdomen pelvis.  The scan did not show any probable cause.  Subsequently his white blood cell count decreased without intervention.  Prior to discharge, his pain was controlled well, he was able to perform ADLs and ambulate independently without difficulty, and had full return of bowel and bladder function.  On 2025, he was discharged to home in stable condition with jejunostomy tube and tube feeds in place.    DISCHARGE EXAM:   A&O, NAD  Resp non-labored  Distal extremities warm    Incisions and jejunostomy tube site healthy appearing    DISCHARGE INSTRUCTIONS:  Discharge Procedure Orders   Reason for your hospital stay   Order Comments: surgery     Activity   Order Comments: As in discharge instruction  "section     Order Specific Question Answer Comments   Is discharge order? Yes      Discharge Instructions   Order Comments: Sleep with the head of your bed elevated to help prevent reflux, which is common after esophagectomy     If your travel plans upon discharge include prolonged periods of sitting (a lengthy car or plane ride), it is highly beneficial to get up and walk at least once per hour to help prevent swelling and blood clots.     You may remove chest tube dressing 48 hours after tube removal and bandage the site at your own discretion thereafter.  Small amounts of leakage are normal for 2-3 days after removal.  Feel free to call with questions.    Do not submerge, soak, or scrub incision or swim until seen in follow-up.    Take incentive spirometer home for continued frequent use    Activity as tolerated, no strenous activity until seen in follow-up, no lifting greater than 10 pounds for the next 2-4 weeks.    Stay hydrated. Take over the counter fiber (metamucil or benefiber) and stool softeners (Miralax, docusate or senna) if becoming constipated.     Call for fever greater than 101.5, chills, increased size of incision, red skin around incision, vision changes, muscle strength changes, sensation changes, shortness of breath, or other concerns.    No driving while taking narcotic pain medication.    Transition to ibuprofen or tylenol/acetaminophen for pain control. Do not take tylenol/acetaminophen and acetaminophen containing narcotic (e.g., percocet or vicodin) at the same time. If you have known ulcer problems, or kidney trouble (elevated creatinine) do not take the ibuprofen.    In emergencies, call 911    For other Questions or Concerns;   A.) During weekday working hours (Monday through Friday 8am to 4:30pm)   call [phone] and ask to speak to a clinical nurse specialist.     B.) At nights (after 4:30pm), on weekends, or if urgent call [phone] and   tell the  \"I would like " "to page job code [phone], the thoracic surgery   fellow on call, please.\"     Tubes and drains   Order Comments: J TUBE INSTRUCTIONS:     RN PLEASE PROVIDE THE PATIENT WITH 2 60ml EN-FIT syringes for flushing his J tube, as well as several 2x2 split gauze for the J tube dressing    Flush your feeding tube with 30cc of water;  1. After medication administration through the feeding tube  2. Before and after tube feeds  3. Every 8 hours while awake if you have not used the tube during that time      -If possible take medications by mouth or as solution via j tube (Do not put crushed pills through the tube if possible)     -Change the gauze around your tube daily or more often if soiled    -KEEP A FLEXI-TRACK (or other tube retention device) on your tube at all times to prevent incidental removal.     Adult [facility] Follow-up and recommended labs and tests   Order Comments: Follow-up with Willy Alvarado MD in the thoracic surgery clinic 2/19, prior to which an esophagram should be obtained.  This visit is currently being scheduled and you will be notified when the time is established.    Appointments on [place] and/or [facility] (with [facility] or [facility] provider or service). Call [phone] if you haven't heard regarding these appointments within 7 days of discharge.     Adult Formula Drip Feeding   Order Comments: TwoCal HN (or equivalent) @ 100 ml/hr x 14 hours (6p-8a) to provide: 1400 ml, 2800 kcals (29 kcal/kg), 117 g protein (1.2g/kg), 306 g CHO, 980 ml free water, and 7 g fiber.  ON discharge,  ensure additional 1800 ml free water per day to provide 2780 ml to meet hydration needs.     Diet   Order Comments: Nothing by mouth at least until clinic follow-up     Order Specific Question Answer Comments   Is discharge order? Yes        DISCHARGE MEDICATIONS:   Current Discharge Medication List        START taking these medications    Details   acetaminophen (TYLENOL) 325 MG/10.15ML liquid Place 30.45 mLs (975 mg) " into Feeding Tube every 8 hours.  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      enoxaparin ANTICOAGULANT (LOVENOX) 40 MG/0.4ML syringe Inject 0.4 mLs (40 mg) subcutaneously every 24 hours for 23 days.  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 Reduce dose or temporarily discontinue if having loose stools.  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

## 2025-02-12 ENCOUNTER — DOCUMENTATION ONLY (OUTPATIENT)
Dept: SURGERY | Facility: CLINIC | Age: 35
End: 2025-02-12
Payer: COMMERCIAL

## 2025-02-12 ENCOUNTER — NURSE TRIAGE (OUTPATIENT)
Dept: SURGERY | Facility: CLINIC | Age: 35
End: 2025-02-12
Payer: COMMERCIAL

## 2025-02-12 NOTE — TELEPHONE ENCOUNTER
Prashant is calling because he cannot find the amiodarone that he was prescribed at discharge. He has all the other medications, but not that one.    This writer offered to call the Discharge Pharmacy to inquire.  Pharmacist at Discharge Pharmacy states that it needed to be compounded because it was prescribed in liquid form. Appears compounding pharmacy sent it to pt's house yesterday.    Called compounding pharmacy:[facility]: [phone]. Per Pharmacist, RX was sent to pt yesterday, and he should be receiving it today.    0957: M for pt to inform him that the RX was sent out yesterday and he should receive it at home today.    Pt called in and spoke to Triage. Pt informed of above and advised to call Compounding Pharmacy if he does not receive med by noon today.

## 2025-02-12 NOTE — TELEPHONE ENCOUNTER
Call from pt to follow up on amioarone. Reviewed notes from previous nurse triage enconter and informed pt he should be receiving medication today, as it was sent yesterday.     Provided number for compounding pharmacy: [phone], advised pt to call compound pharmacy if he does not see medication by noon. Pt voiced understanding.

## 2025-02-12 NOTE — PLAN OF CARE
Physical Therapy Discharge Summary    Reason for therapy discharge:    Discharged to home.    Progress towards therapy goal(s). See goals on Care Plan in [facility] electronic health record for goal details.  Goals partially met.  Barriers to achieving goals:   discharge from facility.    Therapy recommendation(s):    Home with assist as needed for heavier ADLs

## 2025-02-12 NOTE — PROGRESS NOTES
FMLA paperwork completed and faxed to [facility] at [phone].     Yamileth Douglas RN, BSN  Thoracic Surgery RN Care Coordinator

## 2025-02-13 ENCOUNTER — HOME INFUSION BILLING (OUTPATIENT)
Dept: HOME HEALTH SERVICES | Facility: HOME HEALTH | Age: 35
End: 2025-02-13
Payer: COMMERCIAL

## 2025-02-14 PROCEDURE — B4152 EF CALORIE DENSE>/=1.5KCAL: HCPCS

## 2025-02-17 NOTE — PROGRESS NOTES
THORACIC SURGERY FOLLOW UP VISIT    Dear Dr. Cedeño,  I saw Mr. Allen in follow-up today. 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  NEODJUVANT THERAPY   FLOT 4 cycles    COMPLICATIONS FROM NEOADJUVANT THERAPY  None  PROCEDURE   1) Diagnostic laparoscopy (9/30/2024)  2) Laparoscopic and open total gastrectomy, distal esophagectomy with MODE-en-Y intrathoracic esophago-jejunostomy (LEFT thoracotomy); D2 lymphadenectomy (2/3/2025)    HISTOPATHOLOGY   1) (9/30/2024) Diagnostic laparoscopy: Negative with negative peritoneal washings  2) (2/3/2025): Poorly differentiated adenocarcinoma arising at the GEJ, proximal and distal margins negative, 1/27 LN positive for metastatic adenocarcinoma; partial response; LVI and perineural invasion; ypT3N1.    COMPLICATIONS  None    INTERVAL STUDIES  XR Esophagram 2/18/25  1) No leak, stricture or obstruction.          SUBJECTIVE   Prashant is feeling well. Does not report pain. Not taking any narcotics. Continues Strict NPO and on cycled TF at 100 ml/hr x 14 hr. Some nausea with feeds, no emesis. Ambulating and able to take care of himself.    EXAM  Abdomen soft, non-tender, non-distended. Incisions well healed, with staples. J-tube in place at 4-cm at the skin, 5-cm at the rim. No leakage.    From a personal perspective, Prashant is accompanied by his brother Anders today. Looking forward to starting a diet.     IMPRESSION (C16.0) Siewert type III adenocarcinoma of gastroesophageal junction (H)  (primary encounter diagnosis)  (C16.9) Linitis plastica (H)    35 year-old man 3 weeks S/P total gastrectomy, distal esophagectomy,  with intrathoracic Mode-en-Y esophagojejunostomy     PLAN  I spent *** min on the date of the encounter in chart review, patient visit, review of tests, documentation and/or discussion with other providers about the issues documented above. I reviewed the plan as follows:  - Start post-gastrectomy  Clear Liquid Diet, slowly advance to soft diet   - Will monitor daily weights to titrate tube feeds.   - Will keep J-tube in place in case of need during chemotherapy.  - Script sent to pharmacy: Zofran for PRN nausea.  - Follow up with Oncology as scheduled.  1. Necessary Tests & Appointments: CT scan at 3 months post-op. Follow up as scheduled on 03/05/25.  2. Pain Control Plan: PRN OTC medications.  3. Anticoagulation Plan: continue Lovenox 40 mg subcutaneous daily to complete 28 days since discharge.  4. Smoking Cessation: not currently smoking.    All questions were answered and the patient and present family were in agreement with the plan.  I appreciate the opportunity to participate in the care of your patient and will keep you updated.  Sincerely,    Willy Alvarado MD

## 2025-02-18 ENCOUNTER — HOSPITAL ENCOUNTER (OUTPATIENT)
Dept: RADIOLOGY | Facility: CLINIC | Age: 35
Discharge: HOME | End: 2025-02-18
Attending: CLINICAL NURSE SPECIALIST
Payer: COMMERCIAL

## 2025-02-18 DIAGNOSIS — C16.0 SIEWERT TYPE III ADENOCARCINOMA OF ESOPHAGOGASTRIC JUNCTION (H): ICD-10-CM

## 2025-02-18 PROCEDURE — 74220 X-RAY XM ESOPHAGUS 1CNTRST: CPT

## 2025-02-18 RX ORDER — DIATRIZOATE MEGLUMINE AND DIATRIZOATE SODIUM 660; 100 MG/ML; MG/ML
120 SOLUTION ORAL; RECTAL ONCE
Status: COMPLETED | OUTPATIENT
Start: 2025-02-18 | End: 2025-02-18

## 2025-02-18 RX ADMIN — DIATRIZOATE MEGLUMINE AND DIATRIZOATE SODIUM 60 ML: 660; 100 SOLUTION ORAL; RECTAL at 08:57

## 2025-02-19 ENCOUNTER — ONCOLOGY VISIT (OUTPATIENT)
Dept: SURGERY | Facility: CLINIC | Age: 35
End: 2025-02-19
Attending: THORACIC SURGERY (CARDIOTHORACIC VASCULAR SURGERY)
Payer: COMMERCIAL

## 2025-02-19 ENCOUNTER — PATIENT OUTREACH (OUTPATIENT)
Dept: SURGERY | Facility: CLINIC | Age: 35
End: 2025-02-19
Payer: COMMERCIAL

## 2025-02-19 VITALS
SYSTOLIC BLOOD PRESSURE: 124 MMHG | WEIGHT: 202.1 LBS | OXYGEN SATURATION: 98 % | DIASTOLIC BLOOD PRESSURE: 81 MMHG | TEMPERATURE: 98.9 F | HEART RATE: 86 BPM | RESPIRATION RATE: 16 BRPM | BODY MASS INDEX: 25.95 KG/M2

## 2025-02-19 DIAGNOSIS — C16.9 LINITIS PLASTICA (H): ICD-10-CM

## 2025-02-19 DIAGNOSIS — C16.0 SIEWERT TYPE III ADENOCARCINOMA OF GASTROESOPHAGEAL JUNCTION (H): Primary | ICD-10-CM

## 2025-02-19 RX ORDER — MONTELUKAST SODIUM 10 MG/1
10 TABLET ORAL AT BEDTIME
COMMUNITY
Start: 2024-11-21 | End: 2025-11-21

## 2025-02-19 RX ORDER — ONDANSETRON HYDROCHLORIDE 4 MG/5ML
8 SOLUTION ORAL 2 TIMES DAILY PRN
Qty: 480 ML | Refills: 2 | Status: SHIPPED | OUTPATIENT
Start: 2025-02-19

## 2025-02-19 ASSESSMENT — PAIN SCALES - GENERAL: PAINLEVEL_OUTOF10: MILD PAIN (2)

## 2025-02-19 NOTE — NURSING NOTE
"Oncology Rooming Note    February 19, 2025 8:43 AM   Prashant Allen is a 35 year old male who presents for:    Chief Complaint   Patient presents with    Oncology Clinic Visit     Siewert Type III Adenocarcinoma of Gastroesophageal Junction     Initial Vitals: /81 (BP Location: Right arm, Patient Position: Sitting, Cuff Size: Adult Regular)   Pulse 86   Temp 98.9  F (37.2  C) (Oral)   Resp 16   Wt 91.7 kg (202 lb 1.6 oz)   SpO2 98%   BMI 25.95 kg/m   Estimated body mass index is 25.95 kg/m  as calculated from the following:    Height as of 2/11/25: 1.88 m (6' 2\").    Weight as of this encounter: 91.7 kg (202 lb 1.6 oz). Body surface area is 2.19 meters squared.  Mild Pain (2) Comment: Data Unavailable   No LMP for male patient.  Allergies reviewed: Yes  Medications reviewed: Yes    Medications: Medication refills not needed today.  Pharmacy name entered into [facility]: [facility] - [address]    Frailty Screening:   Is the patient here for a new oncology consult visit in cancer care? 2. No    PHQ9:  Did this patient require a PHQ9?: No      Clinical concerns: None       Lizz Crum LPN  2/19/2025                "

## 2025-02-19 NOTE — LETTER
2/19/2025      Prashant Allen  [address] [address]      Dear Colleague,    Thank you for referring your patient, Prashant Allen, to the [facility]. Please see a copy of my visit note below.    THORACIC SURGERY FOLLOW UP VISIT    Dear Dr. Cedeño,  I saw Mr. Allen in follow-up today. 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  NEODJUVANT THERAPY   FLOT 4 cycles    COMPLICATIONS FROM NEOADJUVANT THERAPY  None  PROCEDURE   1) Diagnostic laparoscopy (9/30/2024)  2) Laparoscopic and open total gastrectomy, distal esophagectomy with MODE-en-Y intrathoracic esophago-jejunostomy (LEFT thoracotomy); D2 lymphadenectomy (2/3/2025)    HISTOPATHOLOGY   1) (9/30/2024) Diagnostic laparoscopy: Negative with negative peritoneal washings  2) (2/3/2025): Poorly differentiated adenocarcinoma arising at the GEJ, proximal and distal margins negative, 1/27 LN positive for metastatic adenocarcinoma; partial response; LVI and perineural invasion; ypT3N1.    COMPLICATIONS  None    INTERVAL STUDIES  XR Esophagram 2/18/25  1) No leak, stricture or obstruction.          SUBJECTIVE   Prashant is feeling well. Does not report pain. Not taking any narcotics. Continues Strict NPO and on cycled TF at 100 ml/hr x 14 hr. Some nausea with feeds, no emesis. Ambulating and able to take care of himself.    EXAM  Abdomen soft, non-tender, non-distended. Incisions well healed, with staples. J-tube in place at 4-cm at the skin, 5-cm at the rim. No leakage.    From a personal perspective, Prashant is accompanied by his brother Anders today. Looking forward to starting a diet.     IMPRESSION (C16.0) Siewert type III adenocarcinoma of gastroesophageal junction (H)  (primary encounter diagnosis)  (C16.9) Linitis plastica (H)    35 year-old man 3 weeks S/P total gastrectomy, distal esophagectomy,  with intrathoracic Mode-en-Y EJ for a ypT3N1 (IIIB) Siewert III  adenocarcinoma of the GEJ with linitis plastica    PLAN  I spent  min on the date of the encounter in chart review, patient visit, review of tests, documentation and/or discussion with other providers about the issues documented above. I reviewed the plan as follows:  - Start post-gastrectomy Clear Liquid Diet, slowly advance to soft diet   - Will monitor daily weights to titrate tube feeds.   - Will keep J-tube in place in case of need during chemotherapy.  - Script sent to pharmacy: Zofran for PRN nausea.  - Follow up with Oncology as scheduled.  1. Necessary Tests & Appointments: CT scan at 3 months post-op. Follow up as scheduled on 03/05/25.  2. Pain Control Plan: PRN OTC medications.  3. Anticoagulation Plan: continue Lovenox 40 mg subcutaneous daily to complete 28 days since discharge.  4. Smoking Cessation: not currently smoking.    All questions were answered and the patient and present family were in agreement with the plan.  I appreciate the opportunity to participate in the care of your patient and will keep you updated.  Sincerely,    Willy Alvarado MD       Again, thank you for allowing me to participate in the care of your patient.        Sincerely,        Willy Alvarado MD    Electronically signed

## 2025-02-19 NOTE — TELEPHONE ENCOUNTER
[facility]: Thoracic Surgery                                                      Met with pt (accompanied by his brother) in clinic following visit with Dr. Alvarado.      Handouts given: Post-Gastrectomy Diet     Discussed plan of care including: Beginning to re-introduce oral diet and start weaning tube feeds.   Patient is to starting clear liquid diet today and if tolerating clear liquids for the next 3 days, patient is ok to try some full liquids.     RNCC will outreach again: Monday to see how patient is tolerating clear liquids. If tolerating will plan to decrease tube feedings by 1/4 on Monday.      Pt in agreement and had no further questions or concerns.  Has RNCC's direct contact number if any questions.    Yamileth Douglas RN, BSN  Thoracic Surgery RN Care Coordinator

## 2025-02-24 ENCOUNTER — PATIENT OUTREACH (OUTPATIENT)
Dept: SURGERY | Facility: CLINIC | Age: 35
End: 2025-02-24
Payer: COMMERCIAL

## 2025-02-24 NOTE — TELEPHONE ENCOUNTER
[facility]: Thoracic Surgery                                                                                        Called patient to follow up on how things are going with restarting the oral diet.  Patient verbalized that he tolerating liquids and tried noodles and deli meat today. States that went well. No difficulty swallowing or nausea. Verbalized that he is not hungry but wants to eat.  Reviewed the importance of eating 6-8 small high protein high calorie meals per day. Reviewed the plan to decrease his tube feeding by 1/4 today (down to 3 cans).  Writer will follow up with patient on Friday to see how he is doing with soft foods. If tolerating will decrease to 2 cans. Patient in agreement with plan. No questions or concerns at this time.     Patient appreciated writer's call.    Yamileth Douglas RN, BSN  Thoracic Surgery RN Care Coordinator

## 2025-03-10 ENCOUNTER — PATIENT OUTREACH (OUTPATIENT)
Dept: SURGERY | Facility: CLINIC | Age: 35
End: 2025-03-10
Payer: COMMERCIAL

## 2025-03-10 NOTE — TELEPHONE ENCOUNTER
"[facility]: Thoracic Surgery                                                                                        Called patient to follow up on how things are going with weaning off tube feedings. Patient informed writer that he stopped the tube feedings last week (couldn't remember exact date, but likely Mon March 3). Reports that the nausea is better but still present. Verbalizes that he is not sure if he is nauseated, has no appetite or it's because he gets full quicker. Explained that it is likely a combination of all. Inquired if patient is eating small frequent high calorie/high protein meals. Patient reports \"I'm trying\". States he is mainly grazing. Reiterated the importance of high calorie/high protein foods. Reviewed that with stopping the tube feeding, he needs to take in his nourishment orally. Patient not drinking protein drinks, reports they make him nauseated. Encouraged patient to try [place] Instant Breakfast. Reports that he has the most difficulty with drinking liquids. States he feels like his espohagus tightens up at the top if takes too big of a drink. Is not using straws.     Current weight 194 pounds (was 202 pounds at clinic visit on 2/19/2025).  Reviewed criteria for removing tube. Patient verbalized his understanding and will continue to working on increasing his oral intake. Stated will look into [place] Instant Breakfast.   Writer will follow up with patient next week.    Yamileth Douglas RN, BSN  Thoracic Surgery RN Care Coordinator    "

## 2025-04-02 ENCOUNTER — ONCOLOGY VISIT (OUTPATIENT)
Dept: SURGERY | Facility: CLINIC | Age: 35
End: 2025-04-02
Attending: THORACIC SURGERY (CARDIOTHORACIC VASCULAR SURGERY)
Payer: COMMERCIAL

## 2025-04-02 VITALS
OXYGEN SATURATION: 97 % | WEIGHT: 196.7 LBS | RESPIRATION RATE: 18 BRPM | TEMPERATURE: 98.5 F | HEART RATE: 72 BPM | BODY MASS INDEX: 25.25 KG/M2 | SYSTOLIC BLOOD PRESSURE: 125 MMHG | DIASTOLIC BLOOD PRESSURE: 76 MMHG

## 2025-04-02 DIAGNOSIS — C16.0 SIEWERT TYPE III ADENOCARCINOMA OF GASTROESOPHAGEAL JUNCTION (H): Primary | ICD-10-CM

## 2025-04-02 PROCEDURE — 99024 POSTOP FOLLOW-UP VISIT: CPT | Performed by: THORACIC SURGERY (CARDIOTHORACIC VASCULAR SURGERY)

## 2025-04-02 PROCEDURE — 99213 OFFICE O/P EST LOW 20 MIN: CPT | Performed by: THORACIC SURGERY (CARDIOTHORACIC VASCULAR SURGERY)

## 2025-04-02 ASSESSMENT — PAIN SCALES - GENERAL: PAINLEVEL_OUTOF10: NO PAIN (0)

## 2025-04-02 NOTE — PROGRESS NOTES
"THORACIC SURGERY - FOLLOW UP OFFICE VISIT     Dear Dr. Cedeño,  I saw Mr. Allen in follow-up today. 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  NEODJUVANT THERAPY   FLOT 4 cycles    COMPLICATIONS FROM NEOADJUVANT THERAPY  None  PROCEDURE   1) Diagnostic laparoscopy (9/30/2024)  2) Laparoscopic and open total gastrectomy, distal esophagectomy with MODE-en-Y intrathoracic esophago-jejunostomy (LEFT thoracotomy); D2 lymphadenectomy (2/3/2025)    HISTOPATHOLOGY   1) (9/30/2024) Diagnostic laparoscopy: Negative with negative peritoneal washings  2) (2/3/2025): Poorly differentiated adenocarcinoma arising at the GEJ, proximal and distal margins negative, 1/27 LN positive for metastatic adenocarcinoma; partial response; LVI and perineural invasion; ypT3N1.    COMPLICATIONS  None    INTERVAL STUDIES  XR Esophagram 2/18/25  1) No leak, stricture or obstruction.          SUBJECTIVE   Prashant is doing quite well. He is managing his diet as best as he can, maintaining his weight, and he has no problems with his j-tube (off feedings).    EXAM  /76 (BP Location: Right arm, Patient Position: Sitting, Cuff Size: Adult Regular)   Pulse 72   Temp 98.5  F (36.9  C) (Oral)   Resp 18   Wt 89.2 kg (196 lb 11.2 oz)   SpO2 97%   BMI 25.25 kg/m    Well healed incisions, j tube site clean    From a personal perspective, Prashant is alone here today. He is back at work which helps him a lot to \"get his mind off of things\".    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    PLAN  I spent 20 min on the date of the encounter in chart review, patient visit, review of tests, documentation " and/or discussion with other providers about the issues documented above. I reviewed the plan as follows:  Follow-up in 1 month with a new CT CAP (new baseline)  J-tube:  Prashant has not quite decided if he wants to complete the FLOT regimen. We'll remove the j-tube if he doesn't undergo adjuvant chemotherapy or after adjuvant chemotherapy.    All questions were answered and the patient and present family were in agreement with the plan.  I appreciate the opportunity to participate in the care of your patient and will keep you updated.  Sincerely,    Willy Alvarado MD

## 2025-04-02 NOTE — LETTER
"4/2/2025      Prashant Allen  [address] [place] [id number]      Dear Colleague,    Thank you for referring your patient, Prashant Allen, to the [facility]. Please see a copy of my visit note below.    THORACIC SURGERY - FOLLOW UP OFFICE VISIT     Dear Dr. Cedeño,  I saw Mr. Allen in follow-up today. 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  NEODJUVANT THERAPY   FLOT 4 cycles    COMPLICATIONS FROM NEOADJUVANT THERAPY  None  PROCEDURE   1) Diagnostic laparoscopy (9/30/2024)  2) Laparoscopic and open total gastrectomy, distal esophagectomy with MEOLNY-en-Y intrathoracic esophago-jejunostomy (LEFT thoracotomy); D2 lymphadenectomy (2/3/2025)    HISTOPATHOLOGY   1) (9/30/2024) Diagnostic laparoscopy: Negative with negative peritoneal washings  2) (2/3/2025): Poorly differentiated adenocarcinoma arising at the GEJ, proximal and distal margins negative, 1/27 LN positive for metastatic adenocarcinoma; partial response; LVI and perineural invasion; ypT3N1.    COMPLICATIONS  None    INTERVAL STUDIES  XR Esophagram 2/18/25  1) No leak, stricture or obstruction.          SUBJECTIVE   Prashant is doing quite well. He is managing his diet as best as he can, maintaining his weight, and he has no problems with his j-tube (off feedings).    EXAM  /76 (BP Location: Right arm, Patient Position: Sitting, Cuff Size: Adult Regular)   Pulse 72   Temp 98.5  F (36.9  C) (Oral)   Resp 18   Wt 89.2 kg (196 lb 11.2 oz)   SpO2 97%   BMI 25.25 kg/m    Well healed incisions, j tube site clean    From a personal perspective, Prashant is alone here today. He is back at work which helps him a lot to \"get his mind off of things\".    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    PLAN  I spent 20 min on the date of the encounter in chart review, patient visit, review of tests, documentation and/or discussion with other providers about the issues documented above. I reviewed the plan as follows:  Follow-up in 1 month with a new CT CAP (new baseline)  J-tube:  Prashant has not quite decided if he wants to complete the FLOT regimen. We'll remove the j-tube if he doesn't undergo adjuvant chemotherapy or after adjuvant chemotherapy.    All questions were answered and the patient and present family were in agreement with the plan.  I appreciate the opportunity to participate in the care of your patient and will keep you updated.  Sincerely,    Willy Alvarado MD       Again, thank you for allowing me to participate in the care of your patient.        Sincerely,        Willy Alvarado MD    Electronically signed

## 2025-04-02 NOTE — NURSING NOTE
"Oncology Rooming Note    April 2, 2025 10:00 AM   Prashant Allen is a 35 year old male who presents for:    Chief Complaint   Patient presents with    Oncology Clinic Visit     Gastric cancer     Initial Vitals: /76 (BP Location: Right arm, Patient Position: Sitting, Cuff Size: Adult Regular)   Pulse 72   Temp 98.5  F (36.9  C) (Oral)   Resp 18   Wt 89.2 kg (196 lb 11.2 oz)   SpO2 97%   BMI 25.25 kg/m   Estimated body mass index is 25.25 kg/m  as calculated from the following:    Height as of 2/11/25: 1.88 m (6' 2\").    Weight as of this encounter: 89.2 kg (196 lb 11.2 oz). Body surface area is 2.16 meters squared.  No Pain (0) Comment: Data Unavailable   No LMP for male patient.  Allergies reviewed: Yes  Medications reviewed: Yes    Medications: Medication refills not needed today.  Pharmacy name entered into [employer]: [facility] - [address]    Frailty Screening:   Is the patient here for a new oncology consult visit in cancer care? 2. No    PHQ9:  Did this patient require a PHQ9?: No      Clinical concerns: none.      Dominic Castañeda"

## 2025-04-07 DIAGNOSIS — C16.0 SIEWERT TYPE III ADENOCARCINOMA OF GASTROESOPHAGEAL JUNCTION (H): Primary | ICD-10-CM

## 2025-04-09 ENCOUNTER — NURSE TRIAGE (OUTPATIENT)
Dept: NURSING | Facility: CLINIC | Age: 35
End: 2025-04-09
Payer: COMMERCIAL

## 2025-04-10 NOTE — TELEPHONE ENCOUNTER
"[facility] [place] and [facility]  \"My feeding tube fell out within the last 1/2 hr.\"  Surgery 2/3/2025. Gtube-into his abdomen. \"It got pulled out. Small hole, draining a little bit.   Tube is managed by Dr Alvarado (surgeon) @ [facility] on [facility].\" Patient wants to know if he should try to replace it himself? No home care nurse available. Recommended he discuss with provider first.     Triaged to a disposition of Go to ED now. Discussed option of contacting oncall for Dr Alvarado: clinic phone number given. He will call oncall provider now.     Amanda Oconnor RN Triage Nurse Advisor 9:00 PM 4/9/2025    Reason for Disposition   G-tube (or PEG), J-tube, or GJ-tube came completely out of site in abdominal wall    Additional Information   Negative: Shock suspected (e.g., cold/pale/clammy skin, too weak to stand, low BP, rapid pulse)   Negative: [1] Shortness of breath AND [2] new-onset   Negative: Bluish (or gray) lips or face now   Negative: Sounds like a life-threatening emergency to the triager   Negative: SEVERE abdominal pain   Negative: [1] Vomiting AND [2] contains red blood or black (\"coffee ground\") material  (Exception: Few red streaks in vomit that only happened once.)   Negative: Tube contains red blood or black (\"coffee ground\") material   (Exception: Pink tinge of tube fluid occurs once and clears immediately with irrigation.)    Protocols used: Feeding Tube Symptoms and [id number]    "

## 2025-04-24 ENCOUNTER — HOSPITAL ENCOUNTER (OUTPATIENT)
Dept: CT IMAGING | Facility: CLINIC | Age: 35
Discharge: HOME | End: 2025-04-24
Attending: CLINICAL NURSE SPECIALIST
Payer: COMMERCIAL

## 2025-04-24 DIAGNOSIS — C16.0 SIEWERT TYPE III ADENOCARCINOMA OF GASTROESOPHAGEAL JUNCTION (H): ICD-10-CM

## 2025-04-24 PROCEDURE — 71260 CT THORAX DX C+: CPT

## 2025-04-24 PROCEDURE — 250N000011 HC RX IP 250 OP 636: Performed by: CLINICAL NURSE SPECIALIST

## 2025-04-24 RX ORDER — IOPAMIDOL 755 MG/ML
90 INJECTION, SOLUTION INTRAVASCULAR ONCE
Status: COMPLETED | OUTPATIENT
Start: 2025-04-24 | End: 2025-04-24

## 2025-04-24 RX ADMIN — IOPAMIDOL 90 ML: 755 INJECTION, SOLUTION INTRAVENOUS at 15:01

## 2025-04-26 ENCOUNTER — TELEPHONE (OUTPATIENT)
Dept: SURGERY | Facility: CLINIC | Age: 35
End: 2025-04-26
Payer: COMMERCIAL

## 2025-04-27 NOTE — TELEPHONE ENCOUNTER
Thoracic Surgery Telephone Note  04/26/2025 7:05 PM    I received a phone call regarding results of Prashant AN Alejandro' CT performed on 4/24/25, indicated malpositioned J tube balloon. I instructed the patient on how to deflate and advance the balloon to reposition this. The patient and his sister have had to replace the J tube in the past and feel comfortable advancing the J tube as instructed. Patient denies any acute pain or new symptoms. The patient was also instructed that he can come to the ED if there's any difficulties or concerns regarding this. He has no additional concerns or questions.    Red Menard MD   General Surgery Resident

## 2025-04-30 ENCOUNTER — ONCOLOGY VISIT (OUTPATIENT)
Dept: SURGERY | Facility: CLINIC | Age: 35
End: 2025-04-30
Attending: THORACIC SURGERY (CARDIOTHORACIC VASCULAR SURGERY)
Payer: COMMERCIAL

## 2025-04-30 VITALS
RESPIRATION RATE: 16 BRPM | OXYGEN SATURATION: 97 % | TEMPERATURE: 97.5 F | BODY MASS INDEX: 24.24 KG/M2 | DIASTOLIC BLOOD PRESSURE: 84 MMHG | SYSTOLIC BLOOD PRESSURE: 129 MMHG | HEART RATE: 65 BPM | WEIGHT: 188.8 LBS

## 2025-04-30 DIAGNOSIS — C16.9 LINITIS PLASTICA (H): ICD-10-CM

## 2025-04-30 DIAGNOSIS — C16.0 SIEWERT TYPE III ADENOCARCINOMA OF GASTROESOPHAGEAL JUNCTION (H): Primary | ICD-10-CM

## 2025-04-30 PROCEDURE — 99024 POSTOP FOLLOW-UP VISIT: CPT | Performed by: THORACIC SURGERY (CARDIOTHORACIC VASCULAR SURGERY)

## 2025-04-30 PROCEDURE — 99213 OFFICE O/P EST LOW 20 MIN: CPT | Performed by: THORACIC SURGERY (CARDIOTHORACIC VASCULAR SURGERY)

## 2025-04-30 ASSESSMENT — PAIN SCALES - GENERAL: PAINLEVEL_OUTOF10: NO PAIN (0)

## 2025-04-30 NOTE — NURSING NOTE
"Oncology Rooming Note    April 30, 2025 8:55 AM   Prashant Allen is a 35 year old male who presents for:    Chief Complaint   Patient presents with    Oncology Clinic Visit     Siewert type III adenocarcinoma of gastroesophageal junction     Initial Vitals: /84 (BP Location: Right arm, Patient Position: Sitting, Cuff Size: Adult Regular)   Pulse 65   Temp 97.5  F (36.4  C) (Oral)   Resp 16   Wt 85.6 kg (188 lb 12.8 oz)   SpO2 97%   BMI 24.24 kg/m   Estimated body mass index is 24.24 kg/m  as calculated from the following:    Height as of 2/11/25: 1.88 m (6' 2\").    Weight as of this encounter: 85.6 kg (188 lb 12.8 oz). Body surface area is 2.11 meters squared.  No Pain (0) Comment: Data Unavailable   No LMP for male patient.  Allergies reviewed: Yes  Medications reviewed: Yes    Medications: Medication refills not needed today.  Pharmacy name entered into [employer]: [facility] - [address] AT [facility].OPEN TO ALL.    Frailty Screening:   Is the patient here for a new oncology consult visit in cancer care? 2. No    PHQ9:  Did this patient require a PHQ9?: No      Clinical concerns: none      Van Cedillo              "

## 2025-04-30 NOTE — LETTER
"4/30/2025      Prashant Allen  [address] [place] [id number]      Dear Colleague,    Thank you for referring your patient, Prashant Allen, to the [facility]. Please see a copy of my visit note below.    THORACIC SURGERY - FOLLOW UP OFFICE VISIT      Dear Dr. Cedeño,  I saw Mr. Allen in follow-up today. 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  NEODJUVANT THERAPY   FLOT 4 cycles     COMPLICATIONS FROM NEOADJUVANT THERAPY  None  PROCEDURE   1) Diagnostic laparoscopy (9/30/2024)  2) Laparoscopic and open total gastrectomy, distal esophagectomy with MELONY-en-Y intrathoracic esophago-jejunostomy (LEFT thoracotomy); D2 lymphadenectomy (2/3/2025)     HISTOPATHOLOGY   1) (9/30/2024) Diagnostic laparoscopy: Negative with negative peritoneal washings  2) (2/3/2025): Poorly differentiated adenocarcinoma arising at the GEJ, proximal and distal margins negative, 1/27 LN positive for metastatic adenocarcinoma; partial response; LVI and perineural invasion; ypT3N1.     COMPLICATIONS  None     INTERVAL STUDIES  CT CAP (4/24/2025): No evidence of disease, expected postop changes        SUBJECTIVE   Prashant is doing quite well, but he has lost some weight. He is not snacking enough during the day. Prashant decided to skip postoperative chemotherapy.     EXAM  /84 (BP Location: Right arm, Patient Position: Sitting, Cuff Size: Adult Regular)   Pulse 65   Temp 97.5  F (36.4  C) (Oral)   Resp 16   Wt 85.6 kg (188 lb 12.8 oz)   SpO2 97%   BMI 24.24 kg/m     Well healed incisions, j tube site clean     From a personal perspective, Prashant is alone here today. He is back at work which helps him a lot to \"get his mind off of things\".     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     PLAN  I spent  min on the date of the encounter in chart review, patient visit, review of tests, documentation and/or discussion with other providers about the issues documented above. I reviewed the plan as follows:    I removed his J-tube today  Long-term follow-up with Dr. Lewis  .     All questions were answered and the patient and present family were in agreement with the plan.  I appreciate the opportunity to participate in the care of your patient and will keep you updated.  Sincerely,         Again, thank you for allowing me to participate in the care of your patient.        Sincerely,        Willy Alvarado MD    Electronically signed

## 2025-04-30 NOTE — PROGRESS NOTES
"THORACIC SURGERY - FOLLOW UP OFFICE VISIT      Dear Dr. Cedeño,  I saw Mr. Allen in follow-up today. 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  NEODJUVANT THERAPY   FLOT 4 cycles     COMPLICATIONS FROM NEOADJUVANT THERAPY  None  PROCEDURE   1) Diagnostic laparoscopy (9/30/2024)  2) Laparoscopic and open total gastrectomy, distal esophagectomy with MODE-en-Y intrathoracic esophago-jejunostomy (LEFT thoracotomy); D2 lymphadenectomy (2/3/2025)     HISTOPATHOLOGY   1) (9/30/2024) Diagnostic laparoscopy: Negative with negative peritoneal washings  2) (2/3/2025): Poorly differentiated adenocarcinoma arising at the GEJ, proximal and distal margins negative, 1/27 LN positive for metastatic adenocarcinoma; partial response; LVI and perineural invasion; ypT3N1.     COMPLICATIONS  None     INTERVAL STUDIES  CT CAP (4/24/2025): No evidence of disease, expected postop changes        SUBJECTIVE   Prashant is doing quite well, but he has lost some weight. He is not snacking enough during the day. Prashant decided to skip postoperative chemotherapy.     EXAM  /84 (BP Location: Right arm, Patient Position: Sitting, Cuff Size: Adult Regular)   Pulse 65   Temp 97.5  F (36.4  C) (Oral)   Resp 16   Wt 85.6 kg (188 lb 12.8 oz)   SpO2 97%   BMI 24.24 kg/m     Well healed incisions, j tube site clean     From a personal perspective, Prashant is alone here today. He is back at work which helps him a lot to \"get his mind off of things\".     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     PLAN  I spent  min on the date of the encounter in chart review, patient visit, review of tests, " documentation and/or discussion with other providers about the issues documented above. I reviewed the plan as follows:    I removed his J-tube today  Long-term follow-up with Dr. Lewis  .     All questions were answered and the patient and present family were in agreement with the plan.  I appreciate the opportunity to participate in the care of your patient and will keep you updated.  Sincerely,

## 2025-05-13 ENCOUNTER — MEDICAL CORRESPONDENCE (OUTPATIENT)
Dept: HOME HEALTH SERVICES | Facility: HOME HEALTH | Age: 35
End: 2025-05-13
Payer: COMMERCIAL

## 2025-05-13 ENCOUNTER — HOME INFUSION BILLING (OUTPATIENT)
Dept: HOME HEALTH SERVICES | Facility: HOME HEALTH | Age: 35
End: 2025-05-13
Payer: COMMERCIAL

## 2025-05-15 ENCOUNTER — DOCUMENTATION ONLY (OUTPATIENT)
Dept: SURGERY | Facility: CLINIC | Age: 35
End: 2025-05-15
Payer: COMMERCIAL

## 2025-05-15 NOTE — PROGRESS NOTES
Order for tube feed discontinuation faxed back to [facility] with note that tube feeds were discontinued on 5/6/25.    Eden Campbell RN BSN  Thoracic Surgery RN Care Coordinator  Phone: [phone]

## (undated) DEVICE — DRAPE SLEEVE 599

## (undated) DEVICE — LUBRICANT INST KIT ENDO-LUBE 220-90

## (undated) DEVICE — SOL WATER IRRIG 1000ML BOTTLE 2F7114

## (undated) DEVICE — DEVICE SUTURE PASSER 14GA WECK EFX EFXSP2

## (undated) DEVICE — SU VICRYL 2-0 CT-1 27" UND J259H

## (undated) DEVICE — WIPES FOLEY CARE SURESTEP PROVON DFC100

## (undated) DEVICE — SU VICRYL 0 CTX 36" J370H

## (undated) DEVICE — SU VICRYL+ 0 27 UR6 VLT VCP603H

## (undated) DEVICE — ENDO SYSTEM WATER BOTTLE & TUBING W/CO2 FILTER 00711549

## (undated) DEVICE — SYR 30ML LL W/O NDL 302832

## (undated) DEVICE — Device

## (undated) DEVICE — SU DERMABOND ADVANCED .7ML DNX12

## (undated) DEVICE — SU PLEDGET SOFT TFE 3/8"X3/26"X1/16" PCP40

## (undated) DEVICE — SU VICRYL 4-0 PS-2 18" UND J496H

## (undated) DEVICE — TUBING SUCTION 10'X3/16" N510

## (undated) DEVICE — DRSG STERI STRIP 1/2X4" R1547

## (undated) DEVICE — SU SILK 3-0 TIE 12X30" A304H

## (undated) DEVICE — SU PDS II 0 TP-1 60" Z991G

## (undated) DEVICE — SURGICEL ABSORBABLE HEMOSTAT SNOW 4"X4" 2083

## (undated) DEVICE — SU VICRYL 3-0 SH CR 8X18" J774

## (undated) DEVICE — SYR 30ML SLIP TIP W/O NDL 302833

## (undated) DEVICE — ESU ELEC BLADE E-SEP INSULATED NEPTUNE 165MM 0703-165-002

## (undated) DEVICE — DRAIN PENROSE 3/8X18" LATEX 30416-038

## (undated) DEVICE — SU VICRYL 2-0 SH 27" UND J417H

## (undated) DEVICE — ENDO VALVE BX EVIS MAJ-210

## (undated) DEVICE — SUCTION MANIFOLD NEPTUNE 2 SYS 4 PORT 0702-020-000

## (undated) DEVICE — CLIP HORIZON MED BLUE 002200

## (undated) DEVICE — GLOVE BIOGEL PI MICRO INDICATOR UNDERGLOVE SZ 8.5 48985

## (undated) DEVICE — PREP CHLORAPREP 26ML TINTED HI-LITE ORANGE 930815

## (undated) DEVICE — SYR BULB IRRIG DOVER 60 ML LATEX FREE 67000

## (undated) DEVICE — SURGICEL HEMOSTAT 4X8" 1952

## (undated) DEVICE — LINEN TOWEL PACK X5 5464

## (undated) DEVICE — CLIP HORIZON LG ORANGE 004200

## (undated) DEVICE — CATH TRAY FOLEY SURESTEP 16FR W/TMP PRB STLK LATEX A319416AM

## (undated) DEVICE — BLADE CLIPPER SGL USE 9680

## (undated) DEVICE — ADH LIQUID MASTISOL TOPICAL VIAL 2-3ML 0523-48

## (undated) DEVICE — TUBE GASTROSTOMY MIC LOW VOL ENFIT 16FR SIL 8100-16LV

## (undated) DEVICE — LINEN TOWEL PACK X6 WHITE 5487

## (undated) DEVICE — SU PROLENE 2-0 SHDA 36" 8523H

## (undated) DEVICE — DRAIN JACKSON PRATT ROUND SIL 19FR W/TROCAR LF JP-2232

## (undated) DEVICE — KIT ENDO FIRST STEP DISINFECTANT 200ML W/POUCH EP-4

## (undated) DEVICE — ENDO TROCAR SLEEVE KII Z-THREADED 12X100MM CTS22

## (undated) DEVICE — GLOVE BIOGEL PI MICRO SZ 8.0 48580

## (undated) DEVICE — PACK GOWN 3/PK DISP XL SBA32GPFCB

## (undated) DEVICE — TUBING SUCTION DRAINAGE PLEURAL DUAL 8884714200

## (undated) DEVICE — KIT CONNECTOR FOR OLYMPUS ENDOSCOPES DEFENDO 100310

## (undated) DEVICE — STPL ENDO ARTICULATING 60MM EC60A

## (undated) DEVICE — DRAIN CHEST TUBE RIGHT ANGLED 28FR 8128

## (undated) DEVICE — CONNECTOR BLAKE DRAIN SGL BCC1

## (undated) DEVICE — TUBING SMOKE EVAC PNEUMOCLEAR HIGH FLOW 0620050250

## (undated) DEVICE — DRSG TEGADERM 2 3/8X2 3/4" 1624W

## (undated) DEVICE — DRSG TELFA ISLAND 4X14" 7544

## (undated) DEVICE — ESU GROUND PAD ADULT W/CORD E7507

## (undated) DEVICE — DRAPE IOBAN INCISE 23X17" 6650EZ

## (undated) DEVICE — TUBE JEJUNOSTOMY ENFIT 16FR 8200-16

## (undated) DEVICE — SU SILK 2-0 TIE 12X30" A305H

## (undated) DEVICE — SOL NACL 0.9% IRRIG 1000ML BOTTLE 2F7124

## (undated) DEVICE — SYR 10ML LL W/O NDL 302995

## (undated) DEVICE — ENDO SCOPE WARMER SEAL  C3101

## (undated) DEVICE — LINEN GOWN XLG 5407

## (undated) DEVICE — DRSG PRIMAPORE 02X3" 7133

## (undated) DEVICE — DRAPE SHEET REV FOLD 3/4 9349

## (undated) DEVICE — ENDO VALVE SUCTION BRONCH EVIS MAJ-209

## (undated) DEVICE — SU VICRYL 3-0 SH 27" J316H

## (undated) DEVICE — STRAP UNIVERSAL POSITIONING 2-PIECE 4X47X76" 91-287

## (undated) DEVICE — STPL CIRCULAR CURVED XLONG 25MM ECS25B

## (undated) DEVICE — SU SILK 0 TIE 6X30" A306H

## (undated) DEVICE — DRAPE STERI FLUOROSCOPE 35X43"1012 LATEX FREE

## (undated) DEVICE — SU SILK 0 SH 30" K834H

## (undated) DEVICE — NDL COUNTER 20CT 31142493

## (undated) DEVICE — ESU ELEC BLADE 2.75" COATED/INSULATED E1455

## (undated) DEVICE — SUCTION DRY CHEST DRAIN OASIS 3600-100

## (undated) DEVICE — LINEN TOWEL PACK X30 5481

## (undated) DEVICE — SU SILK 2-0 SH 30" K833H

## (undated) DEVICE — GOWN IMPERVIOUS BREATHABLE SMART XLG 89045

## (undated) DEVICE — CLIP HORIZON SM RED WIDE SLOT 001201

## (undated) DEVICE — SU SILK 3-0 SH 30" K832H

## (undated) DEVICE — DRSG GAUZE 4X4" TRAY 6939

## (undated) DEVICE — SU PROLENE 4-0 RB-1DA 36" 8557H

## (undated) DEVICE — ESU LIGASURE MARYLAND LAPAROSCOPIC SLR/DVDR 5MMX37CM LF1937

## (undated) DEVICE — JELLY LUBRICATING SURGILUBE 2OZ TUBE

## (undated) DEVICE — SPONGE LAP 18X18" X8435

## (undated) DEVICE — SU SILK 3-0 SH CR 8X18" C013D

## (undated) RX ORDER — DEXAMETHASONE SODIUM PHOSPHATE 4 MG/ML
INJECTION, SOLUTION INTRA-ARTICULAR; INTRALESIONAL; INTRAMUSCULAR; INTRAVENOUS; SOFT TISSUE
Status: DISPENSED
Start: 2025-02-03

## (undated) RX ORDER — CEFAZOLIN SODIUM 1 G/3ML
INJECTION, POWDER, FOR SOLUTION INTRAMUSCULAR; INTRAVENOUS
Status: DISPENSED
Start: 2025-02-03

## (undated) RX ORDER — FENTANYL CITRATE 50 UG/ML
INJECTION, SOLUTION INTRAMUSCULAR; INTRAVENOUS
Status: DISPENSED
Start: 2025-02-03

## (undated) RX ORDER — GLYCOPYRROLATE 0.2 MG/ML
INJECTION, SOLUTION INTRAMUSCULAR; INTRAVENOUS
Status: DISPENSED
Start: 2025-02-03

## (undated) RX ORDER — ENOXAPARIN SODIUM 100 MG/ML
INJECTION SUBCUTANEOUS
Status: DISPENSED
Start: 2024-09-30

## (undated) RX ORDER — HYDROMORPHONE HYDROCHLORIDE 1 MG/ML
INJECTION, SOLUTION INTRAMUSCULAR; INTRAVENOUS; SUBCUTANEOUS
Status: DISPENSED
Start: 2025-02-03

## (undated) RX ORDER — FENTANYL CITRATE-0.9 % NACL/PF 10 MCG/ML
PLASTIC BAG, INJECTION (ML) INTRAVENOUS
Status: DISPENSED
Start: 2025-02-03

## (undated) RX ORDER — HEPARIN SODIUM 5000 [USP'U]/.5ML
INJECTION, SOLUTION INTRAVENOUS; SUBCUTANEOUS
Status: DISPENSED
Start: 2025-02-03

## (undated) RX ORDER — BUPIVACAINE HYDROCHLORIDE AND EPINEPHRINE 2.5; 5 MG/ML; UG/ML
INJECTION, SOLUTION EPIDURAL; INFILTRATION; INTRACAUDAL; PERINEURAL
Status: DISPENSED
Start: 2025-02-03

## (undated) RX ORDER — PROPOFOL 10 MG/ML
INJECTION, EMULSION INTRAVENOUS
Status: DISPENSED
Start: 2025-02-03

## (undated) RX ORDER — ACETAMINOPHEN 325 MG/1
TABLET ORAL
Status: DISPENSED
Start: 2024-09-30

## (undated) RX ORDER — CEFAZOLIN SODIUM/WATER 2 G/20 ML
SYRINGE (ML) INTRAVENOUS
Status: DISPENSED
Start: 2024-09-30

## (undated) RX ORDER — HYDROMORPHONE HYDROCHLORIDE 1 MG/ML
INJECTION, SOLUTION INTRAMUSCULAR; INTRAVENOUS; SUBCUTANEOUS
Status: DISPENSED
Start: 2024-09-30

## (undated) RX ORDER — IOPAMIDOL 612 MG/ML
INJECTION, SOLUTION INTRATHECAL
Status: DISPENSED
Start: 2025-02-03

## (undated) RX ORDER — ACETAMINOPHEN 325 MG/1
TABLET ORAL
Status: DISPENSED
Start: 2025-02-03

## (undated) RX ORDER — BUPIVACAINE HYDROCHLORIDE AND EPINEPHRINE 2.5; 5 MG/ML; UG/ML
INJECTION, SOLUTION EPIDURAL; INFILTRATION; INTRACAUDAL; PERINEURAL
Status: DISPENSED
Start: 2024-09-30

## (undated) RX ORDER — ONDANSETRON 2 MG/ML
INJECTION INTRAMUSCULAR; INTRAVENOUS
Status: DISPENSED
Start: 2025-02-03

## (undated) RX ORDER — EPHEDRINE SULFATE 50 MG/ML
INJECTION, SOLUTION INTRAMUSCULAR; INTRAVENOUS; SUBCUTANEOUS
Status: DISPENSED
Start: 2025-02-03

## (undated) RX ORDER — CEFAZOLIN SODIUM/WATER 2 G/20 ML
SYRINGE (ML) INTRAVENOUS
Status: DISPENSED
Start: 2025-02-03

## (undated) RX ORDER — FENTANYL CITRATE 50 UG/ML
INJECTION, SOLUTION INTRAMUSCULAR; INTRAVENOUS
Status: DISPENSED
Start: 2024-09-30